# Patient Record
Sex: MALE | Race: WHITE | NOT HISPANIC OR LATINO | Employment: OTHER | URBAN - METROPOLITAN AREA
[De-identification: names, ages, dates, MRNs, and addresses within clinical notes are randomized per-mention and may not be internally consistent; named-entity substitution may affect disease eponyms.]

---

## 2017-02-02 ENCOUNTER — GENERIC CONVERSION - ENCOUNTER (OUTPATIENT)
Dept: OTHER | Facility: OTHER | Age: 52
End: 2017-02-02

## 2017-02-06 ENCOUNTER — ALLSCRIPTS OFFICE VISIT (OUTPATIENT)
Dept: OTHER | Facility: OTHER | Age: 52
End: 2017-02-06

## 2017-02-06 DIAGNOSIS — E78.5 HYPERLIPIDEMIA: ICD-10-CM

## 2017-02-14 ENCOUNTER — GENERIC CONVERSION - ENCOUNTER (OUTPATIENT)
Dept: OTHER | Facility: OTHER | Age: 52
End: 2017-02-14

## 2017-04-21 ENCOUNTER — HOSPITAL ENCOUNTER (EMERGENCY)
Facility: HOSPITAL | Age: 52
Discharge: HOME | End: 2017-04-21
Admitting: EMERGENCY MEDICINE
Payer: COMMERCIAL

## 2017-04-21 ENCOUNTER — APPOINTMENT (EMERGENCY)
Dept: RADIOLOGY | Facility: HOSPITAL | Age: 52
End: 2017-04-21
Payer: COMMERCIAL

## 2017-04-21 VITALS
RESPIRATION RATE: 18 BRPM | HEART RATE: 85 BPM | OXYGEN SATURATION: 99 % | SYSTOLIC BLOOD PRESSURE: 130 MMHG | TEMPERATURE: 98.2 F | DIASTOLIC BLOOD PRESSURE: 79 MMHG | WEIGHT: 155.4 LBS

## 2017-04-21 DIAGNOSIS — M79.10 MUSCLE PAIN: ICD-10-CM

## 2017-04-21 DIAGNOSIS — M25.511 SHOULDER PAIN, RIGHT: Primary | ICD-10-CM

## 2017-04-21 PROCEDURE — 99283 EMERGENCY DEPT VISIT LOW MDM: CPT

## 2017-04-21 PROCEDURE — 73030 X-RAY EXAM OF SHOULDER: CPT

## 2017-04-21 RX ORDER — METHOCARBAMOL 500 MG/1
500 TABLET, FILM COATED ORAL 4 TIMES DAILY
Qty: 20 TABLET | Refills: 0 | Status: SHIPPED | OUTPATIENT
Start: 2017-04-21 | End: 2017-07-12

## 2017-05-02 ENCOUNTER — ALLSCRIPTS OFFICE VISIT (OUTPATIENT)
Dept: OTHER | Facility: OTHER | Age: 52
End: 2017-05-02

## 2017-05-23 ENCOUNTER — ALLSCRIPTS OFFICE VISIT (OUTPATIENT)
Dept: OTHER | Facility: OTHER | Age: 52
End: 2017-05-23

## 2017-05-23 DIAGNOSIS — E55.9 VITAMIN D DEFICIENCY: ICD-10-CM

## 2017-05-23 DIAGNOSIS — E78.1 PURE HYPERGLYCERIDEMIA: ICD-10-CM

## 2017-05-23 DIAGNOSIS — E78.5 HYPERLIPIDEMIA: ICD-10-CM

## 2017-05-23 DIAGNOSIS — M75.50 BURSITIS OF SHOULDER: ICD-10-CM

## 2017-05-23 DIAGNOSIS — M54.2 CERVICALGIA: ICD-10-CM

## 2017-05-23 DIAGNOSIS — Z98.890 OTHER SPECIFIED POSTPROCEDURAL STATES: ICD-10-CM

## 2017-05-23 DIAGNOSIS — S49.91XA UNSPECIFIED INJURY OF RIGHT SHOULDER AND UPPER ARM, INITIAL ENCOUNTER: ICD-10-CM

## 2017-06-05 ENCOUNTER — ALLSCRIPTS OFFICE VISIT (OUTPATIENT)
Dept: OTHER | Facility: OTHER | Age: 52
End: 2017-06-05

## 2017-06-21 ENCOUNTER — TRANSCRIBE ORDERS (OUTPATIENT)
Dept: ADMINISTRATIVE | Facility: HOSPITAL | Age: 52
End: 2017-06-21

## 2017-06-21 ENCOUNTER — ALLSCRIPTS OFFICE VISIT (OUTPATIENT)
Dept: OTHER | Facility: OTHER | Age: 52
End: 2017-06-21

## 2017-06-21 DIAGNOSIS — S49.91XD INJURY OF RIGHT UPPER ARM, SUBSEQUENT ENCOUNTER: Primary | ICD-10-CM

## 2017-06-27 DIAGNOSIS — E78.5 HYPERLIPIDEMIA: ICD-10-CM

## 2017-06-28 ENCOUNTER — HOSPITAL ENCOUNTER (OUTPATIENT)
Dept: MRI IMAGING | Facility: HOSPITAL | Age: 52
Discharge: HOME | End: 2017-06-28
Attending: ORTHOPAEDIC SURGERY
Payer: COMMERCIAL

## 2017-06-28 DIAGNOSIS — S49.91XA UNSPECIFIED INJURY OF RIGHT SHOULDER AND UPPER ARM, INITIAL ENCOUNTER: ICD-10-CM

## 2017-06-28 PROCEDURE — 73221 MRI JOINT UPR EXTREM W/O DYE: CPT

## 2017-07-05 ENCOUNTER — ALLSCRIPTS OFFICE VISIT (OUTPATIENT)
Dept: OTHER | Facility: OTHER | Age: 52
End: 2017-07-05

## 2017-07-11 ENCOUNTER — ANESTHESIA EVENT (OUTPATIENT)
Dept: PERIOP | Facility: HOSPITAL | Age: 52
End: 2017-07-11
Payer: COMMERCIAL

## 2017-07-12 ENCOUNTER — GENERIC CONVERSION - ENCOUNTER (OUTPATIENT)
Dept: OTHER | Facility: OTHER | Age: 52
End: 2017-07-12

## 2017-07-24 ENCOUNTER — ANESTHESIA (OUTPATIENT)
Dept: PERIOP | Facility: HOSPITAL | Age: 52
End: 2017-07-24
Payer: COMMERCIAL

## 2017-07-24 ENCOUNTER — HOSPITAL ENCOUNTER (OUTPATIENT)
Facility: HOSPITAL | Age: 52
Setting detail: OUTPATIENT SURGERY
Discharge: HOME | End: 2017-07-24
Attending: ORTHOPAEDIC SURGERY | Admitting: ORTHOPAEDIC SURGERY
Payer: COMMERCIAL

## 2017-07-24 VITALS
TEMPERATURE: 98.4 F | DIASTOLIC BLOOD PRESSURE: 61 MMHG | SYSTOLIC BLOOD PRESSURE: 124 MMHG | WEIGHT: 161 LBS | RESPIRATION RATE: 20 BRPM | HEART RATE: 92 BPM | BODY MASS INDEX: 28.53 KG/M2 | HEIGHT: 63 IN | OXYGEN SATURATION: 94 %

## 2017-07-24 PROCEDURE — C1713 ANCHOR/SCREW BN/BN,TIS/BN: HCPCS | Performed by: ORTHOPAEDIC SURGERY

## 2017-07-24 DEVICE — SYSTEM IMPLANT DEL PROX TENODSIS REPAIR: Type: IMPLANTABLE DEVICE | Site: SHOULDER | Status: FUNCTIONAL

## 2017-07-24 DEVICE — ANCHOR SUT 4.5 X 14MM BCMPS CRKSCR FLLY THRD: Type: IMPLANTABLE DEVICE | Site: SHOULDER | Status: FUNCTIONAL

## 2017-07-24 DEVICE — SYSTEM IMPLANT SPEEDBRIDGE W/4.75 BCMPS SWIVELOCK C: Type: IMPLANTABLE DEVICE | Site: SHOULDER | Status: FUNCTIONAL

## 2017-07-24 RX ORDER — SODIUM CHLORIDE 9 MG/ML
125 INJECTION, SOLUTION INTRAVENOUS CONTINUOUS
Status: DISCONTINUED | OUTPATIENT
Start: 2017-07-24 | End: 2017-07-24 | Stop reason: HOSPADM

## 2017-07-24 RX ORDER — ROPIVACAINE HYDROCHLORIDE 5 MG/ML
INJECTION, SOLUTION EPIDURAL; INFILTRATION; PERINEURAL AS NEEDED
Status: DISCONTINUED | OUTPATIENT
Start: 2017-07-24 | End: 2017-07-24 | Stop reason: SURG

## 2017-07-24 RX ORDER — ONDANSETRON 2 MG/ML
4 INJECTION INTRAMUSCULAR; INTRAVENOUS ONCE AS NEEDED
Status: DISCONTINUED | OUTPATIENT
Start: 2017-07-24 | End: 2017-07-24 | Stop reason: HOSPADM

## 2017-07-24 RX ORDER — MEPERIDINE HYDROCHLORIDE 50 MG/ML
12.5 INJECTION INTRAMUSCULAR; INTRAVENOUS; SUBCUTANEOUS AS NEEDED
Status: DISCONTINUED | OUTPATIENT
Start: 2017-07-24 | End: 2017-07-24 | Stop reason: HOSPADM

## 2017-07-24 RX ORDER — FENTANYL CITRATE 50 UG/ML
INJECTION, SOLUTION INTRAMUSCULAR; INTRAVENOUS AS NEEDED
Status: DISCONTINUED | OUTPATIENT
Start: 2017-07-24 | End: 2017-07-24 | Stop reason: SURG

## 2017-07-24 RX ORDER — OXYCODONE HYDROCHLORIDE AND ACETAMINOPHEN 5; 325 MG/1; MG/1
2 TABLET ORAL EVERY 4 HOURS PRN
Status: DISCONTINUED | OUTPATIENT
Start: 2017-07-24 | End: 2017-07-24 | Stop reason: HOSPADM

## 2017-07-24 RX ORDER — ROCURONIUM BROMIDE 10 MG/ML
INJECTION, SOLUTION INTRAVENOUS AS NEEDED
Status: DISCONTINUED | OUTPATIENT
Start: 2017-07-24 | End: 2017-07-24 | Stop reason: SURG

## 2017-07-24 RX ORDER — OXYCODONE HYDROCHLORIDE 5 MG/1
5 TABLET ORAL EVERY 4 HOURS PRN
Qty: 60 TABLET | Refills: 0 | Status: SHIPPED | OUTPATIENT
Start: 2017-07-24 | End: 2017-08-03

## 2017-07-24 RX ORDER — OXYCODONE HYDROCHLORIDE AND ACETAMINOPHEN 5; 325 MG/1; MG/1
1 TABLET ORAL EVERY 4 HOURS PRN
Status: DISCONTINUED | OUTPATIENT
Start: 2017-07-24 | End: 2017-07-24 | Stop reason: HOSPADM

## 2017-07-24 RX ORDER — GLYCOPYRROLATE 0.2 MG/ML
INJECTION INTRAMUSCULAR; INTRAVENOUS AS NEEDED
Status: DISCONTINUED | OUTPATIENT
Start: 2017-07-24 | End: 2017-07-24 | Stop reason: SURG

## 2017-07-24 RX ORDER — MORPHINE SULFATE 2 MG/ML
2 INJECTION, SOLUTION INTRAMUSCULAR; INTRAVENOUS EVERY 4 HOURS PRN
Status: DISCONTINUED | OUTPATIENT
Start: 2017-07-24 | End: 2017-07-24 | Stop reason: HOSPADM

## 2017-07-24 RX ORDER — FENTANYL CITRATE/PF 50 MCG/ML
50 SYRINGE (ML) INJECTION
Status: DISCONTINUED | OUTPATIENT
Start: 2017-07-24 | End: 2017-07-24 | Stop reason: HOSPADM

## 2017-07-24 RX ORDER — PROPOFOL 10 MG/ML
INJECTION, EMULSION INTRAVENOUS AS NEEDED
Status: DISCONTINUED | OUTPATIENT
Start: 2017-07-24 | End: 2017-07-24 | Stop reason: SURG

## 2017-07-24 RX ORDER — ONDANSETRON 2 MG/ML
INJECTION INTRAMUSCULAR; INTRAVENOUS AS NEEDED
Status: DISCONTINUED | OUTPATIENT
Start: 2017-07-24 | End: 2017-07-24 | Stop reason: SURG

## 2017-07-24 RX ORDER — NAPROXEN 500 MG/1
500 TABLET ORAL 2 TIMES DAILY WITH MEALS
Qty: 60 TABLET | Refills: 0 | Status: SHIPPED | OUTPATIENT
Start: 2017-07-24 | End: 2017-11-13 | Stop reason: ALTCHOICE

## 2017-07-24 RX ORDER — ALBUTEROL SULFATE 2.5 MG/3ML
2.5 SOLUTION RESPIRATORY (INHALATION) ONCE AS NEEDED
Status: DISCONTINUED | OUTPATIENT
Start: 2017-07-24 | End: 2017-07-24 | Stop reason: HOSPADM

## 2017-07-24 RX ORDER — PROMETHAZINE HYDROCHLORIDE 12.5 MG/1
12.5 TABLET ORAL EVERY 6 HOURS PRN
Qty: 30 TABLET | Refills: 0 | Status: SHIPPED | OUTPATIENT
Start: 2017-07-24 | End: 2017-11-13 | Stop reason: ALTCHOICE

## 2017-07-24 RX ORDER — EPHEDRINE SULFATE 50 MG/ML
INJECTION, SOLUTION INTRAVENOUS AS NEEDED
Status: DISCONTINUED | OUTPATIENT
Start: 2017-07-24 | End: 2017-07-24 | Stop reason: SURG

## 2017-07-24 RX ORDER — MIDAZOLAM HYDROCHLORIDE 1 MG/ML
INJECTION INTRAMUSCULAR; INTRAVENOUS AS NEEDED
Status: DISCONTINUED | OUTPATIENT
Start: 2017-07-24 | End: 2017-07-24 | Stop reason: SURG

## 2017-07-24 RX ADMIN — FENTANYL CITRATE 100 MCG: 50 INJECTION, SOLUTION INTRAMUSCULAR; INTRAVENOUS at 11:06

## 2017-07-24 RX ADMIN — LIDOCAINE HYDROCHLORIDE 100 MG: 20 INJECTION, SOLUTION INTRAVENOUS at 11:43

## 2017-07-24 RX ADMIN — PHENYLEPHRINE HYDROCHLORIDE 25 MCG/MIN: 10 INJECTION INTRAVENOUS at 13:16

## 2017-07-24 RX ADMIN — SODIUM CHLORIDE: 0.9 INJECTION, SOLUTION INTRAVENOUS at 12:18

## 2017-07-24 RX ADMIN — ROCURONIUM BROMIDE 50 MG: 10 INJECTION, SOLUTION INTRAVENOUS at 11:43

## 2017-07-24 RX ADMIN — ONDANSETRON HYDROCHLORIDE 4 MG: 2 INJECTION, SOLUTION INTRAVENOUS at 13:45

## 2017-07-24 RX ADMIN — EPHEDRINE SULFATE 10 MG: 50 INJECTION, SOLUTION INTRAMUSCULAR; INTRAVENOUS; SUBCUTANEOUS at 12:28

## 2017-07-24 RX ADMIN — PROPOFOL 200 MG: 10 INJECTION, EMULSION INTRAVENOUS at 11:43

## 2017-07-24 RX ADMIN — NEOSTIGMINE METHYLSULFATE 3 MG: 1 INJECTION, SOLUTION INTRAMUSCULAR; INTRAVENOUS; SUBCUTANEOUS at 13:45

## 2017-07-24 RX ADMIN — FENTANYL CITRATE 50 MCG: 50 INJECTION INTRAMUSCULAR; INTRAVENOUS at 14:46

## 2017-07-24 RX ADMIN — EPHEDRINE SULFATE 10 MG: 50 INJECTION, SOLUTION INTRAMUSCULAR; INTRAVENOUS; SUBCUTANEOUS at 12:08

## 2017-07-24 RX ADMIN — GLYCOPYRROLATE 0.5 MG: 0.2 INJECTION, SOLUTION INTRAMUSCULAR; INTRAVENOUS at 13:45

## 2017-07-24 RX ADMIN — ROPIVACAINE HYDROCHLORIDE 40 ML: 5 INJECTION, SOLUTION EPIDURAL; INFILTRATION; PERINEURAL at 11:11

## 2017-07-24 RX ADMIN — EPHEDRINE SULFATE 10 MG: 50 INJECTION, SOLUTION INTRAMUSCULAR; INTRAVENOUS; SUBCUTANEOUS at 12:12

## 2017-07-24 RX ADMIN — CEFAZOLIN SODIUM 1000 MG: 1 SOLUTION INTRAVENOUS at 11:31

## 2017-07-24 RX ADMIN — FENTANYL CITRATE 50 MCG: 50 INJECTION INTRAMUSCULAR; INTRAVENOUS at 14:40

## 2017-07-24 RX ADMIN — SODIUM CHLORIDE 125 ML/HR: 0.9 INJECTION, SOLUTION INTRAVENOUS at 10:33

## 2017-07-24 RX ADMIN — MIDAZOLAM HYDROCHLORIDE 4 MG: 1 INJECTION, SOLUTION INTRAMUSCULAR; INTRAVENOUS at 11:06

## 2017-07-25 ENCOUNTER — HOSPITAL ENCOUNTER (EMERGENCY)
Facility: HOSPITAL | Age: 52
Discharge: HOME | End: 2017-07-25
Attending: EMERGENCY MEDICINE | Admitting: EMERGENCY MEDICINE
Payer: COMMERCIAL

## 2017-07-25 VITALS
RESPIRATION RATE: 18 BRPM | SYSTOLIC BLOOD PRESSURE: 157 MMHG | DIASTOLIC BLOOD PRESSURE: 87 MMHG | BODY MASS INDEX: 28.52 KG/M2 | TEMPERATURE: 98.1 F | HEART RATE: 99 BPM | OXYGEN SATURATION: 96 % | WEIGHT: 161 LBS

## 2017-07-25 DIAGNOSIS — G89.18 ACUTE POST-OPERATIVE PAIN: Primary | ICD-10-CM

## 2017-07-25 PROCEDURE — 99283 EMERGENCY DEPT VISIT LOW MDM: CPT

## 2017-07-25 RX ORDER — OXYCODONE HYDROCHLORIDE 10 MG/1
10 TABLET ORAL ONCE
Status: COMPLETED | OUTPATIENT
Start: 2017-07-25 | End: 2017-07-25

## 2017-07-25 RX ORDER — OXYCODONE HYDROCHLORIDE 5 MG/1
5 TABLET ORAL ONCE
Status: COMPLETED | OUTPATIENT
Start: 2017-07-25 | End: 2017-07-25

## 2017-07-25 RX ADMIN — OXYCODONE HYDROCHLORIDE 10 MG: 10 TABLET ORAL at 10:44

## 2017-07-25 RX ADMIN — OXYCODONE HYDROCHLORIDE 5 MG: 5 TABLET ORAL at 12:07

## 2017-08-07 ENCOUNTER — GENERIC CONVERSION - ENCOUNTER (OUTPATIENT)
Dept: OTHER | Facility: OTHER | Age: 52
End: 2017-08-07

## 2017-08-08 ENCOUNTER — ALLSCRIPTS OFFICE VISIT (OUTPATIENT)
Dept: OTHER | Facility: OTHER | Age: 52
End: 2017-08-08

## 2017-08-08 DIAGNOSIS — M75.21 BICIPITAL TENDINITIS OF RIGHT SHOULDER: ICD-10-CM

## 2017-08-08 DIAGNOSIS — S49.91XA UNSPECIFIED INJURY OF RIGHT SHOULDER AND UPPER ARM, INITIAL ENCOUNTER: ICD-10-CM

## 2017-08-08 DIAGNOSIS — M75.111 INCOMPLETE TEAR OF RIGHT ROTATOR CUFF: ICD-10-CM

## 2017-08-22 ENCOUNTER — TRANSCRIBE ORDERS (OUTPATIENT)
Dept: ADMINISTRATIVE | Facility: HOSPITAL | Age: 52
End: 2017-08-22

## 2017-08-22 ENCOUNTER — APPOINTMENT (OUTPATIENT)
Dept: LAB | Facility: HOSPITAL | Age: 52
End: 2017-08-22
Payer: COMMERCIAL

## 2017-08-22 DIAGNOSIS — E78.5 HYPERLIPIDEMIA: ICD-10-CM

## 2017-08-22 LAB
BASOPHILS # BLD AUTO: 0.03 THOUSANDS/ΜL (ref 0–0.1)
BASOPHILS NFR BLD AUTO: 0 % (ref 0–1)
EOSINOPHIL # BLD AUTO: 0.24 THOUSAND/ΜL (ref 0–0.61)
EOSINOPHIL NFR BLD AUTO: 3 % (ref 0–6)
ERYTHROCYTE [DISTWIDTH] IN BLOOD BY AUTOMATED COUNT: 14.4 % (ref 11.6–15.1)
HCT VFR BLD AUTO: 42.2 % (ref 36.5–49.3)
HGB BLD-MCNC: 14.8 G/DL (ref 12–17)
LYMPHOCYTES # BLD AUTO: 2.31 THOUSANDS/ΜL (ref 0.6–4.47)
LYMPHOCYTES NFR BLD AUTO: 29 % (ref 14–44)
MCH RBC QN AUTO: 31.8 PG (ref 26.8–34.3)
MCHC RBC AUTO-ENTMCNC: 35.1 G/DL (ref 31.4–37.4)
MCV RBC AUTO: 91 FL (ref 82–98)
MONOCYTES # BLD AUTO: 0.63 THOUSAND/ΜL (ref 0.17–1.22)
MONOCYTES NFR BLD AUTO: 8 % (ref 4–12)
NEUTROPHILS # BLD AUTO: 4.81 THOUSANDS/ΜL (ref 1.85–7.62)
NEUTS SEG NFR BLD AUTO: 60 % (ref 43–75)
NRBC BLD AUTO-RTO: 0 /100 WBCS
PLATELET # BLD AUTO: 279 THOUSANDS/UL (ref 149–390)
PMV BLD AUTO: 10.2 FL (ref 8.9–12.7)
RBC # BLD AUTO: 4.66 MILLION/UL (ref 3.88–5.62)
WBC # BLD AUTO: 8.02 THOUSAND/UL (ref 4.31–10.16)

## 2017-08-22 PROCEDURE — 36415 COLL VENOUS BLD VENIPUNCTURE: CPT

## 2017-08-22 PROCEDURE — 85025 COMPLETE CBC W/AUTO DIFF WBC: CPT

## 2017-09-05 ENCOUNTER — GENERIC CONVERSION - ENCOUNTER (OUTPATIENT)
Dept: OTHER | Facility: OTHER | Age: 52
End: 2017-09-05

## 2017-09-11 ENCOUNTER — CLINICAL SUPPORT (OUTPATIENT)
Dept: OTHER | Facility: OTHER | Age: 52
End: 2017-09-11

## 2017-09-12 ENCOUNTER — GENERIC CONVERSION - ENCOUNTER (OUTPATIENT)
Dept: OTHER | Facility: OTHER | Age: 52
End: 2017-09-12

## 2017-10-09 ENCOUNTER — GENERIC CONVERSION - ENCOUNTER (OUTPATIENT)
Dept: OTHER | Facility: OTHER | Age: 52
End: 2017-10-09

## 2017-11-13 ENCOUNTER — HOSPITAL ENCOUNTER (EMERGENCY)
Facility: HOSPITAL | Age: 52
Discharge: HOME | End: 2017-11-13
Attending: EMERGENCY MEDICINE | Admitting: EMERGENCY MEDICINE
Payer: COMMERCIAL

## 2017-11-13 VITALS
SYSTOLIC BLOOD PRESSURE: 140 MMHG | HEART RATE: 83 BPM | TEMPERATURE: 98.5 F | RESPIRATION RATE: 20 BRPM | BODY MASS INDEX: 27.19 KG/M2 | OXYGEN SATURATION: 95 % | WEIGHT: 153.5 LBS | DIASTOLIC BLOOD PRESSURE: 88 MMHG

## 2017-11-13 DIAGNOSIS — T14.8XXD DELAYED WOUND HEALING: Primary | ICD-10-CM

## 2017-11-13 PROCEDURE — 99282 EMERGENCY DEPT VISIT SF MDM: CPT

## 2017-11-13 RX ORDER — GINSENG 100 MG
1 CAPSULE ORAL 2 TIMES DAILY
Qty: 15 G | Refills: 0 | Status: SHIPPED | OUTPATIENT
Start: 2017-11-13 | End: 2018-03-05

## 2017-11-13 RX ORDER — CEPHALEXIN 250 MG/1
250 CAPSULE ORAL EVERY 6 HOURS SCHEDULED
Qty: 28 CAPSULE | Refills: 0 | Status: SHIPPED | OUTPATIENT
Start: 2017-11-13 | End: 2017-11-20

## 2017-11-13 NOTE — ED PROVIDER NOTES
History  Chief Complaint   Patient presents with    Skin Problem     Has an open wound of the left arm  50year old male presents today c/o wound to left forearm  Pt reports sudden onset of itching which started 4 days ago and he says that he has been itching at it since  He denies any insect bites or new exposures  No history of same  No open wounds elsewhere  He denies pain, fevers or drainage, but does report that there was a "red ring around it" this morning  He put salt on it and says that it resolved  He denies IV drug use  Prior to Admission Medications   Prescriptions Last Dose Informant Patient Reported? Taking?    ALPRAZolam (XANAX) 0 25 mg tablet 11/13/2017 at Unknown time  Yes Yes   Sig: Take 0 25 mg by mouth 2 (two) times a day as needed     QUEtiapine (SEROquel) 100 mg tablet 11/12/2017 at Unknown time  Yes Yes   Sig: Take 100 mg by mouth daily at bedtime   albuterol (VENTOLIN HFA) 90 mcg/act inhaler 11/13/2017 at Unknown time  Yes Yes   Sig: Inhale 2 puffs every 4 (four) hours as needed     atorvastatin (LIPITOR) 20 mg tablet 11/13/2017 at Unknown time  Yes Yes   Sig: Take 20 mg by mouth daily     budesonide-formoterol (SYMBICORT) 160-4 5 mcg/act inhaler Past Week at Unknown time  Yes Yes   Sig: Inhale 2 puffs daily     lamoTRIgine (LaMICtal) 25 mg tablet 11/13/2017 at Unknown time  Yes Yes   Sig: Take 25 mg by mouth daily     omeprazole (PriLOSEC) 20 mg delayed release capsule 11/13/2017 at Unknown time  Yes Yes   Sig: Take 20 mg by mouth daily      Facility-Administered Medications: None       Past Medical History:   Diagnosis Date    Anxiety     Asthma     GERD (gastroesophageal reflux disease)     History of fusion of cervical spine     Hyperlipidemia     Neck pain     and down arms ke    Rotator cuff tear, right     Wears glasses        Past Surgical History:   Procedure Laterality Date    CERVICAL FUSION      NV SHLDR ARTHROSCOP,SURG,W/ROTAT CUFF REPR Right 7/24/2017 Procedure: ARTHROSCOPY SHOULDER, ROTATOR CUFF REPAIR,OPEN BICEP TENODESIS;  Surgeon: Bee Cast MD;  Location: [facility];  Service: Orthopedics       History reviewed  No pertinent family history  I have reviewed and agree with the history as documented  Social History   Substance Use Topics    Smoking status: Current Every Day Smoker     Packs/day: 0 25     Years: 10 00     Types: Cigarettes    Smokeless tobacco: Never Used      Comment: 2 ciggs/day    Alcohol use No        Review of Systems   Skin: Positive for wound  All other systems reviewed and are negative  Physical Exam  ED Triage Vitals [11/13/17 0952]   Temperature Pulse Respirations Blood Pressure SpO2   98 5 °F (36 9 °C) 83 20 140/88 95 %      Temp Source Heart Rate Source Patient Position - Orthostatic VS BP Location FiO2 (%)   Oral -- Sitting Right arm --      Pain Score       5           Orthostatic Vital Signs  Vitals:    11/13/17 0952   BP: 140/88   Pulse: 83   Patient Position - Orthostatic VS: Sitting       Physical Exam   Constitutional: He appears well-developed and well-nourished  No distress  HENT:   Head: Normocephalic and atraumatic  Mouth/Throat: Oropharynx is clear and moist    Eyes: Conjunctivae and EOM are normal    Neck: Normal range of motion  Cardiovascular: Normal rate and regular rhythm  Pulmonary/Chest: Effort normal and breath sounds normal  No respiratory distress  He has no wheezes  Abdominal: Soft  Bowel sounds are normal  He exhibits no distension  There is no tenderness  Musculoskeletal: Normal range of motion  Neurological: He is alert  Skin: Skin is warm and dry  Capillary refill takes less than 2 seconds  He is not diaphoretic  No erythema  1 5x1 5cm nonhealing wound with granulation tissue visible  No surrounding erythema, induration, or fluctuance  No streaking  Psychiatric: He has a normal mood and affect   His behavior is normal        ED Medications  Medications - No data to display    Diagnostic Studies  Results Reviewed     None                 No orders to display              Procedures  Procedures       Phone Contacts  ED Phone Contact    ED Course  ED Course                                MDM  Number of Diagnoses or Management Options  Delayed wound healing:   Diagnosis management comments: Will d/c pt on prophylactic abx and instruct to follow-up with wound-care  CritCare Time    Disposition  Final diagnoses:   Delayed wound healing - Nonhealing wound to LUE     Time reflects when diagnosis was documented in both MDM as applicable and the Disposition within this note     Time User Action Codes Description Comment    11/13/2017 10:37 AM Juan Jose, [facility]0 [facility]  8XXD] Delayed wound healing     11/13/2017 10:37 AM Russ Lockett Modify [W25  8XXD] Delayed wound healing Nonhealing wound to LUE      ED Disposition     None      Follow-up Information     Follow up With Specialties Details Why Contact Info Additional Information    Fabi Kenny PA-C Internal Medicine Schedule an appointment as soon as possible for a visit  [address] [place] [address] Schedule an appointment as soon as possible for a visit  [place] [address]  [phone] [address] [place] [address], [facility]        Patient's Medications   Discharge Prescriptions    BACITRACIN TOPICAL OINTMENT 500 UNITS/G TOPICAL OINTMENT    Apply 1 large application topically 2 (two) times a day       Start Date: 11/13/2017End Date: --       Order Dose: 1 large application       Quantity: 15 g    Refills: 0    CEPHALEXIN (KEFLEX) 250 MG CAPSULE    Take 1 capsule by mouth every 6 (six) hours for 7 days       Start Date: 11/13/2017End Date: 11/20/2017       Order Dose: 250 mg       Quantity: 28 capsule    Refills: 0     No discharge procedures on file      ED Provider  Electronically Signed by           Coy Moeller PA-C  11/13/17 1055

## 2017-11-17 ENCOUNTER — APPOINTMENT (OUTPATIENT)
Dept: WOUND CARE | Facility: HOSPITAL | Age: 52
End: 2017-11-17
Payer: COMMERCIAL

## 2017-11-17 PROCEDURE — 99213 OFFICE O/P EST LOW 20 MIN: CPT | Performed by: SURGERY

## 2017-11-17 PROCEDURE — 11042 DBRDMT SUBQ TIS 1ST 20SQCM/<: CPT | Performed by: SURGERY

## 2017-11-29 ENCOUNTER — APPOINTMENT (OUTPATIENT)
Dept: WOUND CARE | Facility: HOSPITAL | Age: 52
End: 2017-11-29
Payer: COMMERCIAL

## 2017-11-29 PROCEDURE — 99213 OFFICE O/P EST LOW 20 MIN: CPT | Performed by: FAMILY MEDICINE

## 2017-12-04 ENCOUNTER — APPOINTMENT (OUTPATIENT)
Dept: WOUND CARE | Facility: HOSPITAL | Age: 52
End: 2017-12-04
Payer: COMMERCIAL

## 2017-12-04 PROCEDURE — 99211 OFF/OP EST MAY X REQ PHY/QHP: CPT | Performed by: SURGERY

## 2017-12-07 ENCOUNTER — APPOINTMENT (OUTPATIENT)
Dept: WOUND CARE | Facility: HOSPITAL | Age: 52
End: 2017-12-07
Payer: COMMERCIAL

## 2017-12-07 PROCEDURE — 99212 OFFICE O/P EST SF 10 MIN: CPT | Performed by: FAMILY MEDICINE

## 2017-12-11 ENCOUNTER — ALLSCRIPTS OFFICE VISIT (OUTPATIENT)
Dept: OTHER | Facility: OTHER | Age: 52
End: 2017-12-11

## 2017-12-21 ENCOUNTER — ALLSCRIPTS OFFICE VISIT (OUTPATIENT)
Dept: OTHER | Facility: OTHER | Age: 52
End: 2017-12-21

## 2018-01-03 ENCOUNTER — ALLSCRIPTS OFFICE VISIT (OUTPATIENT)
Dept: OTHER | Facility: OTHER | Age: 53
End: 2018-01-03

## 2018-01-03 ENCOUNTER — APPOINTMENT (OUTPATIENT)
Dept: RADIOLOGY | Facility: OTHER | Age: 53
End: 2018-01-03
Payer: COMMERCIAL

## 2018-01-03 DIAGNOSIS — M54.12 RADICULOPATHY OF CERVICAL REGION: ICD-10-CM

## 2018-01-03 DIAGNOSIS — M54.50 LOW BACK PAIN: ICD-10-CM

## 2018-01-03 DIAGNOSIS — M25.519 PAIN IN SHOULDER: ICD-10-CM

## 2018-01-03 DIAGNOSIS — M50.00 CERVICAL DISC DISORDER WITH MYELOPATHY OF CERVICAL REGION: ICD-10-CM

## 2018-01-03 PROCEDURE — 73030 X-RAY EXAM OF SHOULDER: CPT

## 2018-01-04 NOTE — PROGRESS NOTES
Assessment   1  Right shoulder injury (959 2) (S49 91XA)    Plan   PMH: Shoulder pain    · * XR SHOULDER 2+ VIEW RIGHT; Status:Active; Requested ASV:[id number];   Right shoulder injury    · Follow-up visit in 3 weeks Evaluation and Treatment  Follow-up  Status: Hold For -    Scheduling  Requested for: [id number]   · Apply an ice pack 4 times a day for 20 minutes the first 2 days ; Status:Complete;   Done:    [id number] 02:46PM    Chief Complaint   1  Shoulder Pain    Discussion/Summary      Status post right shoulder arthroscopy with supraspinatus and subscapularis repair and open biceps tenodesis right shoulder pain without injury, nonspecific exam   discussed the treatment options including getting a new MRI versus ice and anti-inflammatories for a couple of weeks will observe this for the next 2-3 weeks and work on passive and active range of motion of the shoulder as needed in 3-4 weeks  History of Present Illness   53-year-old male follow-up right shoulder arthroscopy on 7/24/2017  He had been doing very well until 2 days ago when he had acute onset of right shoulder pain without an injury  He is concerned that he is retorquing his repairs  Denies numbness in the arm  patient's medical history, surgical history, social history, family history, medications, allergies, and review of systems were reviewed and updated today  of Systems:   Constitutional: No fever or chills, feels well, no tiredness, no recent weight gain or loss  Eyes: No complaints of eyesight problems, no red eyes  ENT: No loss of hearing, no nosebleeds, no sore throat  Cardiovascular: No chest pain, palpitations, leg claudication, or lower extremity edema  Respiratory: No shortness of breath, wheezing, or cough  Gastrointestinal: No abdominal pain, constipation, nausea / vomiting, no diarrhea  Genitourinary: No dysruia or incontinence  Musculoskeletal: As noted in HPI     Integumentary: No rash or skin lesions, no itching or dry skin, no wounds  Neurological: No headache, confusion, numbness or tingling, or dizziness  Endocrine: No muscle weakness, frequent urination, or excessive thirst    Psychiatric: No suicidal thoughts, anxiety, or depression  Active Problems   1  Asthma (493 90) (J45 909)   2  Asthma exacerbation (493 92) (J45 901)   3  Biceps tendonitis, right (726 12) (M75 21)   4  Bipolar disorder, mixed (296 60) (F31 60)   5  Carpal tunnel syndrome (354 0) (G56 00)   6  Cervical radiculopathy (723 4) (M54 12)   7  Chronic pain (338 29) (G89 29)   8  Dyslipidemia (272 4) (E78 5)   9  GERD without esophagitis (530 81) (K21 9)   10  History of neck surgery (V15 29) (Z98 890)   11  HNP (herniated nucleus pulposus) with myelopathy, cervical (722 71) (M50 00)   12  Hypertriglyceridemia (272 1) (E78 1)   13  Incomplete tear of right rotator cuff (840 4) (M75 111)   14  Marijuana smoker, episodic (305 22) (F12 90)   15  Nicotine dependence (305 1) (F17 200)   16  Pain medication agreement (V58 69) (Z02 89)   17  Prepatellar bursitis of right knee (726 65) (M70 41)   18  Right shoulder injury (959 2) (S49 91XA)   19  Subacromial bursitis (726 19) (M75 50)   20  Tobacco abuse (305 1) (Z72 0)   21   Vitamin D deficiency (268 9) (E55 9)    Past Medical History    · History of Acute bronchitis, unspecified organism (466 0) (J20 9)   · History of Acute URI (465 9) (J06 9)   · History of Ankle pain, left (719 47) (M25 572)   · History of Anxiety (300 00) (F41 9)   · History of Back problem (724 9) (M53 9)   · History of Dermatitis of foot (692 9) (L30 9)   · History of Effusion of right knee (719 06) (M25 461)   · History of Encounter for screening for lipid disorder (V77 91) (Z13 220)   · History of Fracture of fibula, distal (824 8)   · History of acute otitis media (V12 49) (Z86 69)   · History of bronchitis (V12 69) (Z87 09)   · History of folliculitis (N01 5) (A42 9)   · History of gastroesophageal reflux (GERD) (V12 79) (Z87 19)   · History of neck pain (V13 59) (Z87 39)   · History of pharyngitis (V12 69) (Z87 09)   · History of viral gastroenteritis (V12 09) (Z86 19)   · History of Knee pain (719 46) (M25 569)   · History of Moderate persistent asthma (493 90) (J45 40)   · History of Neck problem (V48 9) (R68 89)   · History of Neurosis, anxiety, panic type (300 01) (F41 0)   · History of Right knee pain (719 46) (M25 561)   · History of Shoulder pain (719 41) (M25 519)   · History of Tick bites (919 4,E906 4) (W57 XXXA)   · History of Tobacco abuse (305 1) (Z72 0)    Surgical History    · History of Neck Surgery   · History of Oral Surgery Tooth Extraction    Family History   Mother    · No pertinent family history   · Family history of Ovarian cancer  Unknown    · No pertinent family history  Family History    · Family history of Type 2 diabetes mellitus without complication, without long-term current    use of insulin    Social History    · Current every day smoker (305 1) (F17 200)   · No drug use   · Recovering 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)   · last drink [id number]   · Denied: History of Social alcohol use    Current Meds    1  Atorvastatin Calcium 20 MG Oral Tablet (Lipitor); TAKE 1 TABLET DAILY; Therapy: 25BDI5087 to ([facility])  Requested for: 93FHL9052; Last     RT:80JVK8866 Ordered   2  Bupivacaine HCl - 0 25 % Injection Solution (Marcaine); INJECT 4  ML Intra-articular; To     Be Done: 78VLT7737; Status: HOLD FOR - Administration Ordered   3  LamoTRIgine 25 MG Oral Tablet; take 2 tablet daily; Therapy: 64LLO8987 to Recorded   4  Lidocream 4 % External Cream; USE TOPICALLY AS DIRECTED; Therapy: 13GOW7510 to (Last Rx:36Dvl7676)  Requested for: 46Acu0072 Ordered   5  Meloxicam 7 5 MG Oral Tablet; TAKE 1 TABLET TWICE DAILY AS NEEDED FOR PAIN;     Therapy: 00XAS5339 to (Evaluate:22Jan2018)  Requested for: 24Oct2017; Last     PS:43BNC0560 Ordered   6   Naprosyn 500 MG Oral Tablet (Naproxen); TAKE 1 TABLET TWICE DAILY AS NEEDED     Recorded   7  Omeprazole 20 MG Oral Capsule Delayed Release; take 1 capsule daily  Requested for:     03OLI2633; Last Rx:24Oct2017 Ordered   8  QUEtiapine Fumarate 100 MG Oral Tablet; TAKE 1 TABLET TWICE DAILY  Requested     for: 56Kgr5132; Last Rx:15Ptz7437 Ordered   9  Symbicort 160-4 5 MCG/ACT Inhalation Aerosol; Inhale 2 puffs twice daily; Therapy: 99QFL1331 to (Last Rx:21Nov2017)  Requested for: 21Nov2017 Ordered   10  Ventolin  (90 Base) MCG/ACT Inhalation Aerosol Solution; INHALE 1 TO 2      PUFFS EVERY 4 TO 6 HOURS AS NEEDED; Therapy: 85JHT2119 to (Last Rx:21Nov2017)  Requested for: 21Nov2017 Ordered   11  Xanax 0 25 MG Oral Tablet (ALPRAZolam); take 2 tablets by mouth once daily; Therapy: 95KMR9792 to Recorded    Allergies   1  No Known Drug Allergies  2  No Known Environmental Allergies   3  No Known Food Allergies    Vitals    Recorded: 13FGC5205 08:44AM   Heart Rate 83   Systolic 986   Diastolic 81   Height 5 ft 3 8 in   Weight 156 lb    BMI Calculated 26 95   BSA Calculated 1 76     Physical Exam      Right Shoulder: Appearance: Normal except  Tenderness: None except the  ROM: Full except as noted: Motor: Normal except as noted:   Special Tests: Negative except  (Incisions are healed well  No swelling or ecchymosis  Biceps has normal contour  Range of motion in the right shoulder is limited due to pain, but pain is out of proportion  Gentle strength testing of the rotator cuff shows 4 out of 5 strength in the right arm    Neurovascular intact)           Constitutional - General appearance: Normal       Musculoskeletal - Gait and station: Normal -- Digits and nails: Normal -- Muscle strength/tone: Normal       Cardiovascular - Pulses: Normal -- Examination of extremities for edema and/or varicosities: Normal       Skin - Skin and subcutaneous tissue: Normal       Neurologic - Sensation: Normal       Psychiatric - Orientation to person, place, and time: Normal -- Mood and affect: Normal       Eyes      Conjunctiva and lids: Normal        Pupils and irises: Normal        Results/Data   I personally reviewed the films/images/results in the office today  My interpretation follows  X-ray Review Right shoulder: No acute fractures  Biceps tenodesis button is visible without complication  Future Appointments      Date/Time Provider Specialty Site   01/05/2018 04:30 PM [place], [facility] Internal Medicine [facility]   01/23/2018 11:30 AM JAIRO Saini   Orthopedic Surgery [facility] SPECIALISTS [facility]     Signatures    Electronically signed by : JAIRO Jovel ; Lit  3 2018  2:47PM EST                       (Author)

## 2018-01-05 ENCOUNTER — ALLSCRIPTS OFFICE VISIT (OUTPATIENT)
Dept: OTHER | Facility: OTHER | Age: 53
End: 2018-01-05

## 2018-01-10 NOTE — PROGRESS NOTES
Assessment    1  Pharyngitis (462) (J02 9)   2  Asthma (493 90) (J45 909)   3  Cervical radiculopathy (723 4) (M54 12)   4  Bipolar disorder, mixed (296 60) (F31 60)   5  Dermatitis of foot (692 9) (L30 9)   6  History of Tobacco abuse (305 1) (Z72 0)   7  Tobacco abuse (305 1) (Z72 0)    Plan  Asthma    · ProAir  (90 Base) MCG/ACT Inhalation Aerosol Solution; INHALE 1 TO 2 PUFFS EVERY  4 TO 6 HOURS AS NEEDED   · Complete PFT; Status:Hold For - Scheduling; Requested for:18Jan2016;   Carpal tunnel syndrome    · Nystatin 465445 UNIT/ML Mouth/Throat Suspension; SWISH AND SWALLOW 5ML 4 TIMES  DAILY  Carpal tunnel syndrome, Cervical radiculopathy    · TraMADol HCl - 50 MG Oral Tablet (Ultram); Take 1 tab po tid prn for pain  Dermatitis of foot    · Nystatin-Triamcinolone 207926-4 1 UNIT/GM-% External Cream; APPLY SPARINGLY TO  AFFECTED AREA(S) TWICE DAILY to left foot  GERD without esophagitis    · Omeprazole 20 MG Oral Capsule Delayed Release; take 1 capsule daily  Pharyngitis    · Azithromycin 250 MG Oral Tablet; Take 2 tablets today, then 1 tablet daily for 4 days  Tobacco abuse    · Nicotine Step 1 21 MG/24HR Transdermal Patch 24 Hour; APPLY 1 PATCH DAILY AS DIRECTED    Discussion/Summary  Discussion Summary:   Encouraged him to follow with neurosurgeon and cut back on smoking if he is to be considered for possible surgery in the past  Discussed smoking cessation, <1 PPD smoker  Wants to start patches  Patient aware he is not to smoke on patch  Discussed quitting techniques  Continue with ultram for cervical radiculopathy, discussed risks of delaying treatment  Asthma somewhat improved with Symbicort  Stop smoking  Given nystatin gargles and spit to be sused as directed  Take aithromycin as directed  Continue with psych follow up  Topical nystain/triamcinolone cream to base of feet  Do not put between toes     Counseling Documentation With Imm: The patient was counseled regarding instructions for management, risk factor reductions, prognosis, patient and family education, impressions, risks and benefits of treatment options, importance of compliance with treatment  Medication SE Review and Pt Understands Tx: Possible side effects of new medications were reviewed with the patient/guardian today  The treatment plan was reviewed with the patient/guardian  The patient/guardian understands and agrees with the treatment plan      Chief Complaint  Chief Complaint Free Text Note Form: Pt  here to f/u carpal tunnel  no b/w to review  Pt  here c/o ears pain and R foot pain      History of Present Illness  HPI: 52year old male for eval  He notes that he saw pain specialist but notes he was told that he needed injection  He notes that he does not want injection and wants to follow with PCP for pain control  Taking meds as directed  Needs refills  Taking ultram as directed  He met with Dr Katie Ponce who discussed with him getting surgery    Patient at this time is not willing to get surgery  He is aware of the risk of delaying therapy  Following with psychiatrist at [facility]  Changing psychiatrist, will be with Dr Latesha Ortiz  Rash:   CHRIS BILLIG presents with complaints of rash (rash on right foot  No pain  No breaks in skin  + itch  )   Carpal Tunnel Syndrome (Brief): The patient is being seen for an initial evaluation of carpal tunnel syndrome  Symptoms are in the right wrist  Symptoms:  hand tingling and hand numbness, but no hand weakness  Associated symptoms:  Worse at night  Numbness and tingling in hands unchanged  Asthma (Follow-Up): The patient is being seen for a routine clinic follow-up of asthma  Asthma Control: Not well controlled  Interval Symptoms:   improved wheezing, stable coughing, denies shortness of breath and denies chest tightness  Associated symptoms include no chest pain or discomfort  Using symibcort  Does not want to get CT of chest of PFTs     Medications: the patient is not adherent with his medication regimen  Additional History: Using advair once daily  Notes he always has a wheeze  Cough improving  Radiculopathy, Cervical (Follow-Up): The patient is being seen for follow-up of cervical radiculopathy  Interval symptoms:  stable neck pain, stable neck stiffness, stable upper arm pain and stable upper arm paresthesias  Associated symptoms: no urinary incontinence  Additional history: No change in bowel or bladder function  Ran out of ultram, needs refills  Review of Systems  Complete-Male:   Cardiovascular: no chest pain and no palpitations  Respiratory: wheezing and Notes he continues to smoke  Unwilling to quit smoking oralia aiken to cut back  Does bring up mucus when he coughs  , but no shortness of breath and no cough  Gastrointestinal: no nausea and no vomiting  Active Problems    1  Asthma (493 90) (J45 909)   2  Asthma exacerbation (493 92) (J45 901)   3  Bipolar disorder, mixed (296 60) (F31 60)   4  Carpal tunnel syndrome (354 0) (G56 00)   5  Cervical radiculopathy (723 4) (M54 12)   6  GERD without esophagitis (530 81) (K21 9)    Past Medical History    1  History of Acute bronchitis, unspecified organism (466 0) (J20 9)   2  History of Acute URI (465 9) (J06 9)   3  History of Ankle pain, left (719 47) (M25 572)   4  History of Anxiety (300 00) (F41 9)   5  History of Back problem (724 9) (M53 9)   6  History of Encounter for screening for lipid disorder (V77 91) (Z13 220)   7  History of Fracture of fibula, distal (824 8)   8  History of acute otitis media (V12 49) (Z86 69)   9  History of bronchitis (V12 69) (Z87 09)   10  History of gastroesophageal reflux (GERD) (V12 79) (Z87 19)   11  History of viral gastroenteritis (V12 09) (Z86 19)   12  History of Knee pain (719 46) (M25 569)   13  History of Moderate persistent asthma (493 90) (J45 40)   14  History of Neck problem (V48 9) (R68 89)   15  History of Neurosis, anxiety, panic type (300 01) (F41 0)   16   History of Right knee pain (719 46) (M25 561)   17  History of Tobacco abuse (305 1) (Z72 0)  Active Problems And Past Medical History Reviewed: The active problems and past medical history were reviewed and updated today  Surgical History    1  History of Oral Surgery Tooth Extraction  Surgical History Reviewed: The surgical history was reviewed and updated today  Family History    1  No pertinent family history   2  Family history of Ovarian cancer    3  No pertinent family history  Family History Reviewed: The family history was reviewed and updated today  Social History    · Current every day smoker (305 1) (F17 200)   · No drug use   · Recovering alcoholic (162 73) (I10 48)   · Denied: History of Social alcohol use  Social History Reviewed: The social history was reviewed and updated today  The social history was reviewed and is unchanged  Current Meds   1  Fluticasone Propionate 50 MCG/ACT Nasal Suspension; USE 1 SPRAY IN EACH NOSTRIL TWICE   DAILY; Therapy: 27AUD6419 to (Last Rx:28Oct2015)  Requested for: 28Oct2015 Ordered   2  Gabapentin 600 MG Oral Tablet; Take 1 tablet twice daily; Therapy: 43PVF7361 to (Evaluate:97Zbf6150) Recorded   3  Meloxicam 7 5 MG Oral Tablet; TAKE 1 TABLET TWICE DAILY AS NEEDED FOR PAIN;   Therapy: 55YIR9774 to (Evaluate:47Qbg8027)  Requested for: 56HXO4681; Last Rx:01Oct2015   Ordered   4  Omeprazole 20 MG Oral Capsule Delayed Release; take 1 capsule daily  Requested for: 74ZUQ3761;   Last Rx:55Qok0932; Status: ACTIVE - Renewal Denied Ordered   5  Phenytoin Sodium Extended 100 MG Oral Capsule; Take 1 capsule twice daily; Therapy: 41GJG8843 to Recorded   6  QUEtiapine Fumarate 300 MG Oral Tablet; TAKE 1 TABLET AT BEDTIME; Therapy: 85AOS9818 to Recorded   7  Symbicort 160-4 5 MCG/ACT Inhalation Aerosol; Inhale 2 puffs twice daily; Therapy: 66IXW0477 to (Last Rx:11Nov2015)  Requested for: 87OTO2425 Ordered   8  TraMADol HCl - 50 MG Oral Tablet;  Take 1 tab po tid prn for pain; Therapy: 34QMZ2351 to (Last Rx:11Nov2015) Ordered  Medication List Reviewed: The medication list was reviewed and updated today  Allergies    1  No Known Drug Allergies    2  No Known Environmental Allergies   3  No Known Food Allergies    Vitals  Vital Signs [Data Includes: Current Encounter]    Recorded: [id number] 05:36PM Recorded: 11LND7448 04:49PM   Temperature  97 9 F, Oral   Heart Rate  79   Systolic 436, LUE, Sitting 152, LUE, Sitting   Diastolic 86, LUE, Sitting 100, LUE, Sitting   Weight  155 lb    BMI Calculated  26 61   BSA Calculated  1 76   O2 Saturation  99     Physical Exam    Constitutional   General appearance: No acute distress, well appearing and well nourished  Seated in NAD, cooperative, alert  Eyes   Pupils and irises: Equal, round and reactive to light  Ears, Nose, Mouth, and Throat Mild erythema of left TM  + erythema of posterior pharynx  Uvula midline  Oropharynx: Normal with no erythema, edema, exudate or lesions  MMM, mild erythema  Pulmonary   Respiratory effort: Abnormal   Scattered late expiratory wheeze, no rhonchi, no crackles  No resp distress PO2 stable  Auscultation of lungs: Abnormal     Cardiovascular   Auscultation of heart: Normal rate and rhythm, normal S1 and S2, without murmurs  RRR  Abdomen   Abdomen: Non-tender, no masses  Soft  Musculoskeletal AROM slight reduced c-spine ROM  AROM bilateral shoulders  + paraspinus muscle spasm  Skin + xerosis at MCP head 1-5th on plantar aspect of foot  Skin intact, xerotic, puritic  No erythema  Psychiatric Alert, pleasant cooperative          Results/Data         Future Appointments    Date/Time Provider Specialty Site   02/17/2016 05:30 PM Winnie Krabbe, [facility] Internal Medicine [facility] PRIMARY CARE     Signatures   Electronically signed by : Macarena Ramirez [facility]; Jan 18 2016  6:31PM EST                       (Author)    Electronically signed by : Washington Mckeon MD; Jan 18 2016  6:32PM EST                       (Author)

## 2018-01-10 NOTE — MISCELLANEOUS
Signatures   Electronically signed by : Jose E Chavarria DO; Sep 11 2017  2:29PM EST                       (Author)

## 2018-01-10 NOTE — PROGRESS NOTES
Assessment    1  Bipolar disorder, mixed (296 60) (F31 60)   2  GERD without esophagitis (530 81) (K21 9)   3  Hypertriglyceridemia (272 1) (E78 1)   4  Asthma (493 90) (J45 909)   5  Carpal tunnel syndrome (354 0) (G56 00)   6  Cervical radiculopathy (723 4) (M54 12)    Plan   Bipolar disorder, mixed    · From  QUEtiapine Fumarate 200 MG Oral Tablet  To QUEtiapine Fumarate 100 MG Oral  Tablet TAKE 1 TABLET TWICE DAILY  Bipolar disorder, mixed, GERD without esophagitis, Hypertriglyceridemia    · (1) CBC/PLT/DIFF; Status:Active; Requested for:87Hbq1405;    · (1) COMPREHENSIVE METABOLIC PANEL; Status:Active; Requested for:51Ipn4823;    · (1) LIPID PANEL, FASTING; Status:Active; Requested for:62Ujj0235;   Chronic pain    · DULoxetine HCl - 30 MG Oral Capsule Delayed Release Particles (Cymbalta); TAKE 1  CAPSULE DAILY    Hydrocodone-Acetaminophen 5-325 MG Oral Tablet; TAKE 1 TABLET Every 8 hours; Therapy: 92QYG4734 to (Evaluate:91Wfj9053); Last Rx:86Hiv6705; Status: ACTIVE Ordered  Rx By: Reyna Ritchie; Dispense: 0 Days ; #:60 Tablet; Refill: 0;   For: Cervical radiculopathy; BREANNA = N; Record     Annotations              [facility] Script ready for p/u @ [facility] office  [facility] SHRED RX  RE-PRINTED RX IN [facility]  PT AWARE TO PU RX @ [facility]     Discussion/Summary  Discussion Summary:   Since last visit has followed with ortho and had hand injection  Has been following with ortho for knee pain  Keep [facility] aware of any change in tx plan  Continue gabapentin 600 mg every 8 hours  Continue Cymbalta  Will have [facility] get notes from Dr Jannette Castillo and if stable on Seroquel will write for this medication at future visits  Patient coming form [place], compliant on med refills, will give 60 tabs with next refill  Encouraged him to go for labs as directed       Counseling Documentation With Imm: The patient was counseled regarding diagnostic results, instructions for management, risk factor reductions, prognosis, patient and family education, impressions, risks and benefits of treatment options, importance of compliance with treatment  Medication SE Review and Pt Understands Tx: Possible side effects of new medications were reviewed with the patient/guardian today  The treatment plan was reviewed with the patient/guardian  The patient/guardian understands and agrees with the treatment plan      Chief Complaint  Chief Complaint Free Text Note Form: pt here for f/u gerd, bipolar      History of Present Illness  HPI: 52year old male  He notes since last visit had injection into his right hand, limited improvement to his hand  Not currently working  Had his knee drained and is considering surgical intervention for his knees  Patient notes he is not interested in neck surgery  Takin pain meds as directed  Follows [facility] psych Dr Chang Michel, taking seroquel 100mg twice daily  Getting records forwarded to [facility]  Meds help him sleep mood has been stable  Gastroesophageal Reflux Disease (Brief): Symptoms:  no acid regurgitation and no nausea  Asthma (Follow-Up): The patient is being seen for a routine clinic follow-up of asthma  Interval Symptoms:   Additional History: Using inhaler  Not interested in stopping smoking  Radiculopathy, Cervical (Follow-Up): The patient is being seen for follow-up of cervical radiculopathy  Interval symptoms:  stable neck pain, stable neck stiffness, stable upper arm paresthesias, stable upper arm weakness and stable forearm weakness  Additional history: Saw ortho and had hand injection  Carpal Tunnel Syndrome (Follow-Up): The patient is being seen for follow-up of bilateral carpal tunnel syndrome and Had injection in right hand/wrist  The patient reports no change in the condition  Interval symptoms:   pain,  paresthesias and  weakness  Medications:  the patient is adherent to his medication regimen  Additional history: splints help symptoms at night  Review of Systems  Complete-Male:   Constitutional: no fever  Cardiovascular: no chest pain  Respiratory: cough, wheezing and using inhalers  Active Problems     1  Asthma (493 90) (J45 909)   2  Asthma exacerbation (493 92) (J45 901)   3  Bipolar disorder, mixed (296 60) (F31 60)   4  Carpal tunnel syndrome (354 0) (G56 00)   5  Cervical radiculopathy (723 4) (M54 12)   6  Chronic pain (338 29) (G89 29)   7  Effusion of right knee (719 06) (M25 461)   8  GERD without esophagitis (530 81) (K21 9)   9  Hypertriglyceridemia (272 1) (E78 1)   10  Knee pain (719 46) (M25 569)   11  Marijuana smoker, episodic (305 22) (F12 20)   12  Pain medication agreement (V58 69) (Z79 899)   13  Tick bites (919 4,E906 4) (W57 XXXA)   14  Tobacco abuse (305 1) (Z72 0)    Prepatellar bursitis of right knee (726 65) (M70 41)          Past Medical History    1  History of Acute bronchitis, unspecified organism (466 0) (J20 9)   2  History of Acute URI (465 9) (J06 9)   3  History of Ankle pain, left (719 47) (M25 572)   4  History of Anxiety (300 00) (F41 9)   5  History of Back problem (724 9) (M53 9)   6  History of Dermatitis of foot (692 9) (L30 9)   7  History of Encounter for screening for lipid disorder (V77 91) (Z13 220)   8  History of Fracture of fibula, distal (824 8)   9  History of acute otitis media (V12 49) (Z86 69)   10  History of bronchitis (V12 69) (Z87 09)   11  History of gastroesophageal reflux (GERD) (V12 79) (Z87 19)   12  History of pharyngitis (V12 69) (Z87 09)   13  History of viral gastroenteritis (V12 09) (Z86 19)   14  History of Moderate persistent asthma (493 90) (J45 40)   15  History of Neck problem (V48 9) (R68 89)   16  History of Neurosis, anxiety, panic type (300 01) (F41 0)   17  History of Right knee pain (719 46) (M25 561)   18  History of Tobacco abuse (305 1) (Z72 0)  Active Problems And Past Medical History Reviewed:    The active problems and past medical history were reviewed and updated today  Surgical History    1  History of Oral Surgery Tooth Extraction  Surgical History Reviewed: The surgical history was reviewed and updated today  Family History  Mother    1  No pertinent family history   2  Family history of Ovarian cancer  Unknown    3  No pertinent family history  Family History Reviewed: The family history was reviewed and updated today  Social History    · Current every day smoker (305 1) (F17 200)   · No drug use   · 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)   · Denied: History of Social alcohol use  Social History Reviewed: The social history was reviewed and is unchanged  Current Meds   1  DULoxetine HCl - 30 MG Oral Capsule Delayed Release Particles; TAKE 1 CAPSULE DAILY; Therapy: 67Rpd6471 to (Evaluate:07Xmh1336)  Requested for: 22RJP0950; Last Rx:07Jun2016   Ordered   2  Fluticasone Propionate 50 MCG/ACT Nasal Suspension; USE 1 SPRAY IN EACH NOSTRIL TWICE   DAILY; Therapy: 10XTV7659 to (Last Rx:07Jun2016)  Requested for: 07Jun2016 Ordered   3  Hydrocodone-Acetaminophen 5-325 MG Oral Tablet; TAKE 1 TABLET Every 8 hours; Therapy: 87TCJ2270 to (Evaluate:55Ewk3446); Last Rx:23Jun2016 Ordered   4  Meloxicam 7 5 MG Oral Tablet; TAKE 1 TABLET TWICE DAILY AS NEEDED FOR PAIN;   Therapy: 89VMI2524 to (Adarsh Christine)  Requested for: 39SHT0297; Last Rx:07Jun2016   Ordered   5  Omeprazole 40 MG Oral Capsule Delayed Release; TAKE 1 CAPSULE DAILY  Requested for:   62ZUV1207; Last Rx:07Jun2016 Ordered   6  ProAir  (90 Base) MCG/ACT Inhalation Aerosol Solution; INHALE 1 TO 2 PUFFS EVERY 4 TO   6 HOURS AS NEEDED; Therapy: 33GZD1039 to (Last Rx:07Jun2016)  Requested for: 07Jun2016 Ordered   7  QUEtiapine Fumarate 200 MG Oral Tablet; Therapy: (Recorded:81Qvq8455) to Recorded   8  Symbicort 160-4 5 MCG/ACT Inhalation Aerosol; Inhale 2 puffs twice daily;    Therapy: 73EWD9714 to (Last Rx:07Jun2016)  Requested for: 07Jun2016 Ordered  Medication List Reviewed: The medication list was reviewed and updated today  Allergies    1  No Known Drug Allergies    2  No Known Environmental Allergies   3  No Known Food Allergies    Vitals  Vital Signs    Recorded: 05QQV6830 57:02FD   Systolic 291, LUE, Sitting   Diastolic 68, LUE, Sitting   Heart Rate 78   Temperature 98 4 F, Oral   O2 Saturation 98, RA   Height 5 ft 4 5 in   Weight 148 lb 5 oz   BMI Calculated 25 06   BSA Calculated 1 73     Physical Exam    Constitutional   General appearance: No acute distress, well appearing and well nourished  Alert in NAD  Ears, Nose, Mouth, and Throat   Oropharynx: Normal with no erythema, edema, exudate or lesions  MMM, normal pharynx  Pulmonary   Respiratory effort: Abnormal   Intermittent, late expiratory wheeze  No crackles or rhonchi, no resp distress  + cough  Auscultation of lungs: Abnormal     Cardiovascular   Auscultation of heart: Normal rate and rhythm, normal S1 and S2, without murmurs  RRR  Musculoskeletal   Gait and station: Abnormal   (Right knee, mild suprapatella effusion  Skin overlying is not tight  No erythema  + knee crepitus) Painful AROM cervical spine with paraspinus muscle spasm  AROM bilateral shoulders  Symmetric  and biceps strength  Psychiatric Pleasant cooperative, no Si no HI  good eye contact, mood stable          Future Appointments    Date/Time Provider Specialty Site   10/05/2016 05:00 PM Leah Plunkett MD Internal Medicine [facility]   08/12/2016 03:30 PM Tristin Le DO Orthopedic Surgery [facility] ORTHO SPECIALISTS [facility]   09/07/2016 05:15 PM Sandra Panchal [facility] Internal Medicine [facility]     Signatures   Electronically signed by : Emeka Roberson, [facility]; Jul 5 2016  5:59PM EST                       (Author)    Electronically signed by : Sunni Turner MD; Aug  1 2016 12:27AM EST                       (Validation)

## 2018-01-11 ENCOUNTER — HOSPITAL ENCOUNTER (OUTPATIENT)
Dept: RADIOLOGY | Facility: HOSPITAL | Age: 53
Discharge: HOME | End: 2018-01-11
Payer: COMMERCIAL

## 2018-01-11 DIAGNOSIS — M54.50 LOW BACK PAIN: ICD-10-CM

## 2018-01-11 PROCEDURE — 72110 X-RAY EXAM L-2 SPINE 4/>VWS: CPT

## 2018-01-11 NOTE — MISCELLANEOUS
Message  I attempted to contact Mr Mingo Summers to give him the results of his fluid analysis  His cell/home phone number went right to voicemail which wasn't set up  His work number does not accept incoming phone calls        Signatures   Electronically signed by : Lukas Ray DO; May 13 2016  3:51PM EST                       (Author)

## 2018-01-12 VITALS
HEART RATE: 90 BPM | OXYGEN SATURATION: 98 % | DIASTOLIC BLOOD PRESSURE: 76 MMHG | BODY MASS INDEX: 25.84 KG/M2 | SYSTOLIC BLOOD PRESSURE: 132 MMHG | HEIGHT: 64 IN | TEMPERATURE: 96 F | WEIGHT: 151.38 LBS

## 2018-01-13 VITALS
SYSTOLIC BLOOD PRESSURE: 108 MMHG | DIASTOLIC BLOOD PRESSURE: 74 MMHG | BODY MASS INDEX: 26.12 KG/M2 | HEIGHT: 64 IN | HEART RATE: 101 BPM | WEIGHT: 153 LBS

## 2018-01-13 VITALS
WEIGHT: 155.13 LBS | TEMPERATURE: 98.4 F | BODY MASS INDEX: 26.49 KG/M2 | SYSTOLIC BLOOD PRESSURE: 128 MMHG | HEIGHT: 64 IN | DIASTOLIC BLOOD PRESSURE: 78 MMHG | OXYGEN SATURATION: 97 % | HEART RATE: 89 BPM

## 2018-01-14 VITALS
HEART RATE: 76 BPM | HEIGHT: 64 IN | WEIGHT: 162.25 LBS | BODY MASS INDEX: 27.7 KG/M2 | DIASTOLIC BLOOD PRESSURE: 72 MMHG | SYSTOLIC BLOOD PRESSURE: 120 MMHG

## 2018-01-14 VITALS
BODY MASS INDEX: 26.98 KG/M2 | WEIGHT: 158 LBS | HEIGHT: 64 IN | SYSTOLIC BLOOD PRESSURE: 109 MMHG | HEART RATE: 99 BPM | DIASTOLIC BLOOD PRESSURE: 68 MMHG

## 2018-01-14 NOTE — MISCELLANEOUS
Message  Spoke with Cinda Becerra from [facility] who works with Dr Kane Benoit  Patient has not been following regularly with their office and has been in discussions with their office that psychiatric care was being transitioned to [facility]  [facility] has forwarded records and med list to our office today and his additional assessment earlier  Patients current provider who had been filling his seroquel is not willing to refill this medication because patient has upcoming visit with Dr Kane Benoit  Patient requested he change psychiatrist to see Dr Kane Redding notes patient has not been compliant with his office follow up as of late  Patient will be running out of seroquel and will not be able to get filled until after visit with Dr Kane Benoit  Cinda Becerra is reaching out to our office to see if patient could get refill from our office until his appointment with Dr Kane Benoit  I agreed to filling seroquel x1 month to allow for time for patient to get in with Dr Kane Benoit however I will not be taking on patient's psychiatric care for long term  I requested that once patient has his followup with Dr Kane Benoit, he continue to follow with [facility] and Dr Kane Benoit for his ongoing psychiatric medication management  I requested Dr Kane Benoit reach out to myself to futher discuss this case  Signatures   Electronically signed by : Pamela Garrido, [facility];  Aug 19 2016  6:17PM EST                       (Author)

## 2018-01-15 ENCOUNTER — GENERIC CONVERSION - ENCOUNTER (OUTPATIENT)
Dept: OTHER | Facility: OTHER | Age: 53
End: 2018-01-15

## 2018-01-15 VITALS
HEART RATE: 80 BPM | BODY MASS INDEX: 25.84 KG/M2 | SYSTOLIC BLOOD PRESSURE: 155 MMHG | DIASTOLIC BLOOD PRESSURE: 102 MMHG | HEIGHT: 64 IN | WEIGHT: 151.38 LBS

## 2018-01-16 ENCOUNTER — HOSPITAL ENCOUNTER (OUTPATIENT)
Dept: RADIOLOGY | Facility: HOSPITAL | Age: 53
Discharge: HOME | End: 2018-01-16
Payer: COMMERCIAL

## 2018-01-16 DIAGNOSIS — M54.12 RADICULOPATHY OF CERVICAL REGION: ICD-10-CM

## 2018-01-16 DIAGNOSIS — M50.00 CERVICAL DISC DISORDER WITH MYELOPATHY OF CERVICAL REGION: ICD-10-CM

## 2018-01-16 PROCEDURE — 72050 X-RAY EXAM NECK SPINE 4/5VWS: CPT

## 2018-01-18 NOTE — MISCELLANEOUS
Signatures   Electronically signed by : Daphne Green, ; Aug  7 2017 10:15AM EST                       (Author)

## 2018-01-18 NOTE — MISCELLANEOUS
Message   Recorded as Task   Date: 07/05/2017 02:24 PM, Created By: Xochitl Horner   Task Name: Call Back   Assigned To: [facility],Team   Regarding Patient: Layvonne Aschoff, Status: Active   Comment:    Bushra Acosta - 05 Jul 2017 2:24 PM     TASK CREATED  PLEASE CALL PATIENT TO SCHEDULE APPT  [phone]  Ramos Max 68 Jul 2017 1:06 PM     TASK IN PROGRESS   April Dave - 07 Jul 2017 1:15 PM     TASK EDITED  Intake faxed to [facility] for ins review   Janki Oh - 12 Jul 2017 8:53 AM     TASK REPLIED TO: Previously Assigned To [facility],Team  Intake back from review, ok to schedule per Dr Cordelia Pastrana  Lmom for pt to    Donte Lopez - 12 Jul 2017 11:49 AM     TASK EDITED  Pt called, appt  scheduled for 8/7 at 10am  New packet sent  Active Problems    1  Asthma (493 90) (J45 909)   2  Asthma exacerbation (493 92) (J45 901)   3  Biceps tendonitis, right (726 12) (M75 21)   4  Bipolar disorder, mixed (296 60) (F31 60)   5  Carpal tunnel syndrome (354 0) (G56 00)   6  Cervical radiculopathy (723 4) (M54 12)   7  Chronic pain (338 29) (G89 29)   8  Dyslipidemia (272 4) (E78 5)   9  GERD without esophagitis (530 81) (K21 9)   10  History of neck surgery (V15 29) (Z98 890)   11  HNP (herniated nucleus pulposus) with myelopathy, cervical (722 71) (M50 00)   12  Hypertriglyceridemia (272 1) (E78 1)   13  Incomplete tear of right rotator cuff (840 4) (M75 111)   14  Marijuana smoker, episodic (305 22) (F12 20)   15  Neck pain (723 1) (M54 2)   16  Nicotine dependence (305 1) (F17 200)   17  Pain medication agreement (V58 69) (Z02 89)   18  Prepatellar bursitis of right knee (726 65) (M70 41)   19  Right shoulder injury (959 2) (S49 91XA)   20  Shoulder pain (719 41) (M25 519)   21  Subacromial bursitis (726 19) (M91 50)   22  Tobacco abuse (305 1) (Z72 0)   23  Vitamin D deficiency (268 9) (E55 9)    Current Meds   1   Atorvastatin Calcium 20 MG Oral Tablet (Lipitor); TAKE 1 TABLET DAILY; Therapy: 49TCG2214 to (Lela Hannah)  Requested for: 14THH2228; Last   OF:33ZXK8863 Ordered   2  Bupivacaine HCl - 0 25 % Injection Solution (Marcaine); INJECT 4  ML Intra-articular; To   Be Done: 92YOQ7065; Status: HOLD FOR - Administration Ordered   3  LamoTRIgine 25 MG Oral Tablet; take 2 tablet daily; Therapy: 74KUP2328 to Recorded   4  Meloxicam 7 5 MG Oral Tablet; TAKE 1 TABLET TWICE DAILY AS NEEDED FOR PAIN;   Therapy: 91NXU6496 to (Evaluate:44Muu0906)  Requested for: 23QXM8229; Last   Rx:05Jun2017 Ordered   5  Naproxen 500 MG Oral Tablet; TAKE 1 TABLET TWICE DAILY; Therapy: 21Jun2017 to (Evaluate:20Nfp4429)  Requested for: 21Jun2017; Last   WR:26TEK7549 Ordered   6  Omeprazole 20 MG Oral Capsule Delayed Release; take 1 capsule daily  Requested for:   55OPV4224; Last Rx:05Jun2017 Ordered   7  QUEtiapine Fumarate 100 MG Oral Tablet; TAKE 1 TABLET TWICE DAILY  Requested   for: 10Ejy8166; Last Rx:10Dxh5424 Ordered   8  Symbicort 160-4 5 MCG/ACT Inhalation Aerosol; Inhale 2 puffs twice daily; Therapy: 13ZVG1891 to (Last Rx:43Nla8714)  Requested for: 59DLY4974 Ordered   9  Ventolin  (90 Base) MCG/ACT Inhalation Aerosol Solution; INHALE 1 TO 2   PUFFS EVERY 4 TO 6 HOURS AS NEEDED; Therapy: 37ZME0652 to (Last Rx:05Jun2017)  Requested for: 97YWT5795 Ordered   10  Xanax 0 25 MG Oral Tablet (ALPRAZolam); take 2 tablets by mouth once daily; Therapy: 48BWA8763 to Recorded    Allergies    1  No Known Drug Allergies    2  No Known Environmental Allergies   3   No Known Food Allergies    Signatures   Electronically signed by : Frannie Patel, ; Jul 12 2017 11:50AM EST                       (Author)

## 2018-01-22 VITALS
SYSTOLIC BLOOD PRESSURE: 117 MMHG | WEIGHT: 153 LBS | HEIGHT: 64 IN | HEART RATE: 89 BPM | DIASTOLIC BLOOD PRESSURE: 70 MMHG | BODY MASS INDEX: 26.12 KG/M2

## 2018-01-22 VITALS
HEIGHT: 64 IN | HEART RATE: 85 BPM | TEMPERATURE: 98.7 F | WEIGHT: 165.25 LBS | BODY MASS INDEX: 28.21 KG/M2 | DIASTOLIC BLOOD PRESSURE: 80 MMHG | SYSTOLIC BLOOD PRESSURE: 128 MMHG | OXYGEN SATURATION: 96 %

## 2018-01-22 VITALS
DIASTOLIC BLOOD PRESSURE: 65 MMHG | BODY MASS INDEX: 28.42 KG/M2 | WEIGHT: 166.5 LBS | HEART RATE: 88 BPM | HEIGHT: 64 IN | SYSTOLIC BLOOD PRESSURE: 95 MMHG

## 2018-01-23 ENCOUNTER — APPOINTMENT (OUTPATIENT)
Dept: LAB | Facility: MEDICAL CENTER | Age: 53
End: 2018-01-23
Payer: COMMERCIAL

## 2018-01-23 ENCOUNTER — TRANSCRIBE ORDERS (OUTPATIENT)
Dept: ADMINISTRATIVE | Facility: HOSPITAL | Age: 53
End: 2018-01-23

## 2018-01-23 VITALS
HEART RATE: 83 BPM | SYSTOLIC BLOOD PRESSURE: 120 MMHG | HEIGHT: 64 IN | BODY MASS INDEX: 26.63 KG/M2 | DIASTOLIC BLOOD PRESSURE: 81 MMHG | WEIGHT: 156 LBS

## 2018-01-23 DIAGNOSIS — Z79.899 NEED FOR PROPHYLACTIC CHEMOTHERAPY: ICD-10-CM

## 2018-01-23 DIAGNOSIS — Z79.899 NEED FOR PROPHYLACTIC CHEMOTHERAPY: Primary | ICD-10-CM

## 2018-01-23 LAB
BASOPHILS # BLD AUTO: 0.04 THOUSANDS/ΜL (ref 0–0.1)
BASOPHILS NFR BLD AUTO: 0 % (ref 0–1)
CHOLEST SERPL-MCNC: 157 MG/DL (ref 50–200)
EOSINOPHIL # BLD AUTO: 0.18 THOUSAND/ΜL (ref 0–0.61)
EOSINOPHIL NFR BLD AUTO: 2 % (ref 0–6)
ERYTHROCYTE [DISTWIDTH] IN BLOOD BY AUTOMATED COUNT: 13 % (ref 11.6–15.1)
GLUCOSE P FAST SERPL-MCNC: 90 MG/DL (ref 65–99)
HCT VFR BLD AUTO: 41.8 % (ref 36.5–49.3)
HDLC SERPL-MCNC: 44 MG/DL (ref 40–60)
HGB BLD-MCNC: 14.3 G/DL (ref 12–17)
LDLC SERPL CALC-MCNC: 78 MG/DL (ref 0–100)
LYMPHOCYTES # BLD AUTO: 1.93 THOUSANDS/ΜL (ref 0.6–4.47)
LYMPHOCYTES NFR BLD AUTO: 20 % (ref 14–44)
MCH RBC QN AUTO: 30.9 PG (ref 26.8–34.3)
MCHC RBC AUTO-ENTMCNC: 34.2 G/DL (ref 31.4–37.4)
MCV RBC AUTO: 90 FL (ref 82–98)
MONOCYTES # BLD AUTO: 0.55 THOUSAND/ΜL (ref 0.17–1.22)
MONOCYTES NFR BLD AUTO: 6 % (ref 4–12)
NEUTROPHILS # BLD AUTO: 7 THOUSANDS/ΜL (ref 1.85–7.62)
NEUTS SEG NFR BLD AUTO: 72 % (ref 43–75)
NRBC BLD AUTO-RTO: 0 /100 WBCS
PLATELET # BLD AUTO: 303 THOUSANDS/UL (ref 149–390)
PMV BLD AUTO: 9.9 FL (ref 8.9–12.7)
RBC # BLD AUTO: 4.63 MILLION/UL (ref 3.88–5.62)
T3FREE SERPL-MCNC: 1.63 PG/ML (ref 2.3–4.2)
T4 FREE SERPL-MCNC: 0.48 NG/DL (ref 0.76–1.46)
TRIGL SERPL-MCNC: 177 MG/DL
TSH SERPL DL<=0.05 MIU/L-ACNC: 1.38 UIU/ML (ref 0.36–3.74)
WBC # BLD AUTO: 9.74 THOUSAND/UL (ref 4.31–10.16)

## 2018-01-23 PROCEDURE — 36415 COLL VENOUS BLD VENIPUNCTURE: CPT

## 2018-01-23 PROCEDURE — 84439 ASSAY OF FREE THYROXINE: CPT

## 2018-01-23 PROCEDURE — 84481 FREE ASSAY (FT-3): CPT

## 2018-01-23 PROCEDURE — 84443 ASSAY THYROID STIM HORMONE: CPT

## 2018-01-23 PROCEDURE — 82947 ASSAY GLUCOSE BLOOD QUANT: CPT

## 2018-01-23 PROCEDURE — 85025 COMPLETE CBC W/AUTO DIFF WBC: CPT

## 2018-01-23 PROCEDURE — 80061 LIPID PANEL: CPT

## 2018-01-23 NOTE — MISCELLANEOUS
Provider Comments  Provider Comments:   patient no call no show for appt  lmom      Signatures   Electronically signed by : Jluis Maya [facility]; Dec 12 2017  8:03PM EST                       (Author)

## 2018-01-23 NOTE — RESULT NOTES
Message  Mr Christian Dill came in today wanting surgery for his right prepatellar bursitis  I let him know that I do not think he is a good surgical candidate due to noncompliance  He was previously scheduled for surgery and did not show up for pre-admission testing or surgery  He also did not return any phone calls during this time  I let him know I would see him today, but did not think surgery was an option with me at this time  I offered conservative treatment including aspiration and injection  He declined  I let him know it would be possible for him to be seen by another physician in our group and that they may be willing to consider surgery for him  He declined to make an appointment today  He has the phone number if he changes his mind  He verbalized understanding of the situation        Signatures   Electronically signed by : Edwige Lew DO; Dec 21 2017 10:16AM EST                       (Author)

## 2018-01-24 DIAGNOSIS — J45.909 ASTHMA, UNSPECIFIED ASTHMA SEVERITY, UNSPECIFIED WHETHER COMPLICATED, UNSPECIFIED WHETHER PERSISTENT: Primary | ICD-10-CM

## 2018-01-24 DIAGNOSIS — E78.00 HYPERCHOLESTEROLEMIA: Primary | ICD-10-CM

## 2018-01-24 RX ORDER — ATORVASTATIN CALCIUM 20 MG/1
TABLET, FILM COATED ORAL
Qty: 90 TABLET | Refills: 1 | Status: SHIPPED | OUTPATIENT
Start: 2018-01-24 | End: 2018-02-08 | Stop reason: SDUPTHER

## 2018-02-08 DIAGNOSIS — E78.00 HYPERCHOLESTEROLEMIA: ICD-10-CM

## 2018-02-08 RX ORDER — ATORVASTATIN CALCIUM 20 MG/1
20 TABLET, FILM COATED ORAL DAILY
Qty: 90 TABLET | Refills: 0 | Status: SHIPPED | OUTPATIENT
Start: 2018-02-08 | End: 2018-02-28 | Stop reason: SDUPTHER

## 2018-02-08 RX ORDER — ATORVASTATIN CALCIUM 20 MG/1
TABLET, FILM COATED ORAL
Qty: 90 TABLET | Refills: 1 | OUTPATIENT
Start: 2018-02-08

## 2018-02-08 NOTE — TELEPHONE ENCOUNTER
Pharmacy said Lipitor was never sent to pharmacy  - it doesn't show on medication it was rec by pharm      Patient called and said Symicort is not going to be covered anymore by pharmacy   Patient does not have paper with him right now and which ones are covered but will call and tell us

## 2018-02-14 DIAGNOSIS — J45.909 ASTHMA, UNSPECIFIED ASTHMA SEVERITY, UNSPECIFIED WHETHER COMPLICATED, UNSPECIFIED WHETHER PERSISTENT: ICD-10-CM

## 2018-02-14 RX ORDER — BUDESONIDE AND FORMOTEROL FUMARATE DIHYDRATE 160; 4.5 UG/1; UG/1
2 AEROSOL RESPIRATORY (INHALATION) 2 TIMES DAILY
Qty: 10.2 G | Refills: 0 | Status: CANCELLED | OUTPATIENT
Start: 2018-02-14

## 2018-02-14 RX ORDER — BUDESONIDE AND FORMOTEROL FUMARATE DIHYDRATE 160; 4.5 UG/1; UG/1
AEROSOL RESPIRATORY (INHALATION)
Qty: 10.2 G | Refills: 1 | Status: SHIPPED | OUTPATIENT
Start: 2018-02-14 | End: 2018-02-28 | Stop reason: SDUPTHER

## 2018-02-16 ENCOUNTER — OFFICE VISIT (OUTPATIENT)
Dept: OBGYN CLINIC | Facility: MEDICAL CENTER | Age: 53
End: 2018-02-16
Payer: COMMERCIAL

## 2018-02-16 VITALS
BODY MASS INDEX: 28.46 KG/M2 | HEIGHT: 63 IN | WEIGHT: 160.6 LBS | DIASTOLIC BLOOD PRESSURE: 81 MMHG | SYSTOLIC BLOOD PRESSURE: 134 MMHG | HEART RATE: 78 BPM

## 2018-02-16 DIAGNOSIS — M70.41 PREPATELLAR BURSITIS OF RIGHT KNEE: Primary | ICD-10-CM

## 2018-02-16 PROCEDURE — 99214 OFFICE O/P EST MOD 30 MIN: CPT | Performed by: ORTHOPAEDIC SURGERY

## 2018-02-16 RX ORDER — MELOXICAM 7.5 MG/1
1 TABLET ORAL 2 TIMES DAILY PRN
COMMUNITY
Start: 2011-10-14 | End: 2018-02-25 | Stop reason: SDUPTHER

## 2018-02-16 RX ORDER — BUDESONIDE AND FORMOTEROL FUMARATE DIHYDRATE 160; 4.5 UG/1; UG/1
2 AEROSOL RESPIRATORY (INHALATION) 2 TIMES DAILY
COMMUNITY
Start: 2015-11-11 | End: 2018-06-08 | Stop reason: ALTCHOICE

## 2018-02-16 RX ORDER — ERGOCALCIFEROL 1.25 MG/1
1 CAPSULE ORAL WEEKLY
COMMUNITY
Start: 2018-01-05 | End: 2018-09-26 | Stop reason: SDUPTHER

## 2018-02-16 RX ORDER — LAMOTRIGINE 25 MG/1
2 TABLET ORAL DAILY
COMMUNITY
Start: 2017-02-06 | End: 2018-04-03 | Stop reason: SDUPTHER

## 2018-02-16 RX ORDER — ALPRAZOLAM 0.25 MG/1
2 TABLET ORAL DAILY
COMMUNITY
Start: 2017-02-06 | End: 2018-02-28 | Stop reason: SDUPTHER

## 2018-02-16 RX ORDER — QUETIAPINE FUMARATE 100 MG/1
100 TABLET, FILM COATED ORAL 2 TIMES DAILY
COMMUNITY
End: 2018-04-03 | Stop reason: SDUPTHER

## 2018-02-16 RX ORDER — DULOXETIN HYDROCHLORIDE 60 MG/1
60 CAPSULE, DELAYED RELEASE ORAL EVERY MORNING
Refills: 0 | COMMUNITY
Start: 2018-02-05

## 2018-02-16 RX ORDER — ATORVASTATIN CALCIUM 20 MG/1
20 TABLET, FILM COATED ORAL DAILY
COMMUNITY
Start: 2016-11-28 | End: 2018-07-05 | Stop reason: SDUPTHER

## 2018-02-16 RX ORDER — OMEPRAZOLE 20 MG/1
1 CAPSULE, DELAYED RELEASE ORAL DAILY
COMMUNITY
End: 2018-02-27 | Stop reason: SDUPTHER

## 2018-02-16 RX ORDER — ALBUTEROL SULFATE 90 UG/1
1-2 AEROSOL, METERED RESPIRATORY (INHALATION) EVERY 4 HOURS PRN
COMMUNITY
Start: 2016-01-18 | End: 2018-08-14 | Stop reason: SDUPTHER

## 2018-02-16 NOTE — PROGRESS NOTES
Orthopaedic Surgery - Office Note  Lance Green (89 y o  male)   : 1965   MRN: [id number]  Encounter Date: 2018    Chief Complaint   Patient presents with    Right Knee - Pain, Swelling       Assessment / Plan  R knee prepatellar bursitis    · The diagnosis and treatment options were reviewed  · The patient wishes to proceed with R prepateller bursectomy   · The risks, benefits, and alternatives were discussed  · Written consent was obtained  Return for Recheck 2 weeks post op  History of Present Illness  Lance Green is a 50 y o  male who presents with R knee pain  Pt has previously seen Dr Ho Bates for his R knee prepatellar bursitis  Pt would like to talk about surgery for his knee at this point  Pt has tried 1 corticosteroid injection and has had it aspirated 4 times without relief  Pt states that he is no longer working and is is not aggravating it by kneeling on it or doing activities  Pt states that he is not having any numbness and tingling  Review of Systems  A comprehensive review of systems was negative  Physical Exam  /81   Pulse 78   Ht 5' 3" (1 6 m)   Wt 72 8 kg (160 lb 9 6 oz)   BMI 28 45 kg/m²   Cons: Appears well  No apparent distress  Psych: Alert  Oriented x3  Mood and affect normal   Eyes: PERRLA, EOMI  Resp: Normal effort  No audible wheezing or stridor  CV: Palpable pulse  No discernable arrhythmia  No LE edema  Lymph:  No palpable cervical, axillary, or inguinal lymphadenopathy  Skin: Warm  No palpable masses  No visible lesions  Neuro: Normal muscle tone  Normal and symmetric DTR's  Right Knee Exam  Alignment:  Normal knee alignment  Inspection:  moderate anterior knee swelling  chonric prepateller  edema  No erythema  No ecchymosis  No muscle atrophy  No deformity  Palpation:  mild tenderness at preparellar bursa  no joint line tenderness   ROM:  Normal knee ROM  Strength:  5/5 quadriceps and hamstrings    Stability:  No objective knee instability  Stable Varus / Valgus stress, Lachman, and Posterior drawer  Tests:  No pertinent positive or negative tests  Patella:  Patella tracks centrally without crepitus  Neurovascular:  Sensation intact in DP/SP/[place]/Sa/T nerve distributions  2+ DP & PT pulses  Gait:  Normal     Studies Reviewed  No studies to review    Procedures  No procedures today  Medical, Surgical, Family, and Social History  The patient's medical history, family history, and social history, were reviewed and updated as appropriate  Past Medical History:   Diagnosis Date    Anxiety     Asthma     GERD (gastroesophageal reflux disease)     History of fusion of cervical spine     Hyperlipidemia     Neck pain     and down arms ke    Rotator cuff tear, right     Wears glasses        Past Surgical History:   Procedure Laterality Date    CERVICAL FUSION      [place] SHLDR ARTHROSCOP,SURG,W/ROTAT CUFF REPR Right 7/24/2017    Procedure: ARTHROSCOPY SHOULDER, ROTATOR CUFF REPAIR,OPEN BICEP TENODESIS;  Surgeon: Sharlotte Castleman, MD;  Location: [facility];  Service: Orthopedics       No family history on file  Social History     Occupational History    Not on file       Social History Main Topics    Smoking status: Current Every Day Smoker     Packs/day: 0 25     Years: 10 00     Types: Cigarettes    Smokeless tobacco: Never Used      Comment: 2 ciggs/day    Alcohol use No    Drug use: No    Sexual activity: Not on file       No Known Allergies      Current Outpatient Prescriptions:     albuterol (VENTOLIN HFA) 90 mcg/act inhaler, Inhale 1-2 puffs, Disp: , Rfl:     ALPRAZolam (XANAX) 0 25 mg tablet, Take 0 25 mg by mouth 2 (two) times a day as needed  , Disp: , Rfl:     atorvastatin (LIPITOR) 20 mg tablet, Take 1 tablet (20 mg total) by mouth daily, Disp: 90 tablet, Rfl: 0    budesonide-formoterol (SYMBICORT) 160-4 5 mcg/act inhaler, Inhale 2 puffs 2 (two) times a day, Disp: , Rfl:     DULoxetine (CYMBALTA) 60 mg delayed release capsule, Take 60 mg by mouth every morning, Disp: , Rfl: 0    ergocalciferol (VITAMIN D2) 50,000 units, Take 1 capsule by mouth once a week, Disp: , Rfl:     lamoTRIgine (LaMICtal) 25 mg tablet, Take 25 mg by mouth daily  , Disp: , Rfl:     meloxicam (MOBIC) 7 5 mg tablet, Take 1 tablet by mouth 2 (two) times a day as needed, Disp: , Rfl:     omeprazole (PriLOSEC) 20 mg delayed release capsule, Take 20 mg by mouth daily, Disp: , Rfl:     QUEtiapine (SEROquel) 100 mg tablet, Take 100 mg by mouth daily at bedtime, Disp: , Rfl:     VENTOLIN  (90 Base) MCG/ACT inhaler, inhale 1 to 2 puffs by mouth every 4 to 6 hours if needed, Disp: 18 g, Rfl: 1    ALPRAZolam (XANAX) 0 25 mg tablet, Take 2 tablets by mouth daily, Disp: , Rfl:     atorvastatin (LIPITOR) 20 mg tablet, Take 1 tablet by mouth daily, Disp: , Rfl:     bacitracin topical ointment 500 units/g topical ointment, Apply 1 large application topically 2 (two) times a day, Disp: 15 g, Rfl: 0    lamoTRIgine (LaMICtal) 25 mg tablet, Take 2 tablets by mouth daily, Disp: , Rfl:     omeprazole (PriLOSEC) 20 mg delayed release capsule, Take 1 capsule by mouth daily, Disp: , Rfl:     QUEtiapine (SEROquel) 100 mg tablet, Take 1 tablet by mouth 2 (two) times a day, Disp: , Rfl:     SYMBICORT 160-4 5 MCG/ACT inhaler, inhale 2 puffs by mouth twice a day, Disp: 10 2 g, Rfl: 1      Shilpa Maurice    Scribe Attestation    I,:   Shilpa Maurice am acting as a scribe while in the presence of the attending physician :        I,:   Chiqui Chandra MD personally performed the services described in this documentation    as scribed in my presence :

## 2018-02-25 ENCOUNTER — ANESTHESIA EVENT (OUTPATIENT)
Dept: PERIOP | Facility: HOSPITAL | Age: 53
End: 2018-02-25
Payer: COMMERCIAL

## 2018-02-25 DIAGNOSIS — M70.41 PREPATELLAR BURSITIS OF RIGHT KNEE: ICD-10-CM

## 2018-02-26 NOTE — ANESTHESIA PREPROCEDURE EVALUATION
Review of Systems/Medical History  Patient summary reviewed  Chart reviewed  No history of anesthetic complications     Cardiovascular  Negative cardio ROS Hyperlipidemia,    Pulmonary  Smoker cigarette smoker  , Asthma (wheezes with smoking): well controlled/ stable ,        GI/Hepatic    GERD ,        Negative  ROS        Endo/Other  Negative endo/other ROS      GYN       Hematology  Negative hematology ROS      Musculoskeletal  Back pain (fusion) , cervical pain,   Arthritis     Neurology  Negative neurology ROS      Psychology   Negative psychology ROS Anxiety,              Physical Exam    Airway    Mallampati score: II  TM Distance: >3 FB  Neck ROM: limited     Dental   Comment: No teeth,     Cardiovascular  Comment: Negative ROS, Rhythm: regular, Rate: normal,     Pulmonary  Rhonchi (R chest Cleared with cough), Decreased breath sounds,     Other Findings        Anesthesia Plan  ASA Score- 2     Anesthesia Type- general with ASA Monitors  Additional Monitors:   Airway Plan: LMA  Plan Factors- Patient instructed to abstain from smoking on day of procedure  Patient smoked on day of surgery  Induction- intravenous  Postoperative Plan-     Informed Consent- Anesthetic plan and risks discussed with patient  I personally reviewed this patient with the CRNA  Discussed and agreed on the Anesthesia Plan with the CRNA  Jolene Nguyen

## 2018-02-27 DIAGNOSIS — K21.9 GASTROESOPHAGEAL REFLUX DISEASE WITHOUT ESOPHAGITIS: Primary | ICD-10-CM

## 2018-02-27 DIAGNOSIS — M70.41 PREPATELLAR BURSITIS OF RIGHT KNEE: ICD-10-CM

## 2018-02-27 RX ORDER — MELOXICAM 7.5 MG/1
TABLET ORAL
Qty: 60 TABLET | Refills: 2 | Status: SHIPPED | OUTPATIENT
Start: 2018-02-27 | End: 2018-03-08 | Stop reason: HOSPADM

## 2018-02-27 RX ORDER — OMEPRAZOLE 20 MG/1
CAPSULE, DELAYED RELEASE ORAL
Qty: 30 CAPSULE | Refills: 3 | Status: SHIPPED | OUTPATIENT
Start: 2018-02-27 | End: 2018-02-28 | Stop reason: SDUPTHER

## 2018-02-28 ENCOUNTER — OFFICE VISIT (OUTPATIENT)
Dept: INTERNAL MEDICINE CLINIC | Facility: CLINIC | Age: 53
End: 2018-02-28
Payer: COMMERCIAL

## 2018-02-28 VITALS
WEIGHT: 157.2 LBS | OXYGEN SATURATION: 98 % | SYSTOLIC BLOOD PRESSURE: 118 MMHG | HEIGHT: 63 IN | TEMPERATURE: 98.1 F | DIASTOLIC BLOOD PRESSURE: 64 MMHG | HEART RATE: 81 BPM | BODY MASS INDEX: 27.85 KG/M2

## 2018-02-28 DIAGNOSIS — Z72.0 TOBACCO ABUSE: ICD-10-CM

## 2018-02-28 DIAGNOSIS — J45.31 MILD PERSISTENT ASTHMATIC BRONCHITIS WITH ACUTE EXACERBATION: ICD-10-CM

## 2018-02-28 DIAGNOSIS — M54.50 LOW BACK PAIN WITHOUT SCIATICA, UNSPECIFIED BACK PAIN LATERALITY, UNSPECIFIED CHRONICITY: ICD-10-CM

## 2018-02-28 DIAGNOSIS — R94.6 ABNORMAL THYROID FUNCTION TEST: ICD-10-CM

## 2018-02-28 DIAGNOSIS — F31.60 BIPOLAR DISORDER, MIXED (HCC): ICD-10-CM

## 2018-02-28 DIAGNOSIS — E78.5 DYSLIPIDEMIA: ICD-10-CM

## 2018-02-28 DIAGNOSIS — F17.210 CIGARETTE NICOTINE DEPENDENCE WITHOUT COMPLICATION: ICD-10-CM

## 2018-02-28 DIAGNOSIS — K21.9 GERD WITHOUT ESOPHAGITIS: ICD-10-CM

## 2018-02-28 DIAGNOSIS — Z12.11 SCREENING FOR COLON CANCER: Primary | ICD-10-CM

## 2018-02-28 PROBLEM — M50.00 HNP (HERNIATED NUCLEUS PULPOSUS) WITH MYELOPATHY, CERVICAL: Status: ACTIVE | Noted: 2017-02-06

## 2018-02-28 PROBLEM — M54.2 NECK PAIN: Status: ACTIVE | Noted: 2017-06-05

## 2018-02-28 PROBLEM — Z98.1 S/P CERVICAL SPINAL FUSION: Status: ACTIVE | Noted: 2017-06-28

## 2018-02-28 PROBLEM — M75.111 INCOMPLETE TEAR OF RIGHT ROTATOR CUFF: Status: ACTIVE | Noted: 2017-07-05

## 2018-02-28 PROBLEM — J45.901 ASTHMATIC BRONCHITIS WITH ACUTE EXACERBATION: Status: ACTIVE | Noted: 2018-02-28

## 2018-02-28 PROCEDURE — 99214 OFFICE O/P EST MOD 30 MIN: CPT | Performed by: PHYSICIAN ASSISTANT

## 2018-02-28 PROCEDURE — 3725F SCREEN DEPRESSION PERFORMED: CPT | Performed by: PHYSICIAN ASSISTANT

## 2018-02-28 RX ORDER — PREDNISONE 20 MG/1
40 TABLET ORAL DAILY
Qty: 10 TABLET | Refills: 0 | Status: SHIPPED | OUTPATIENT
Start: 2018-02-28 | End: 2018-03-05

## 2018-02-28 RX ORDER — AZITHROMYCIN 250 MG/1
TABLET, FILM COATED ORAL
Qty: 6 TABLET | Refills: 0 | Status: SHIPPED | OUTPATIENT
Start: 2018-02-28 | End: 2018-03-05

## 2018-02-28 RX ORDER — MELOXICAM 7.5 MG/1
TABLET ORAL
Qty: 60 TABLET | Refills: 2 | Status: SHIPPED | OUTPATIENT
Start: 2018-02-28 | End: 2018-02-28 | Stop reason: SDUPTHER

## 2018-02-28 RX ORDER — OMEPRAZOLE 20 MG/1
CAPSULE, DELAYED RELEASE ORAL
Qty: 30 CAPSULE | Refills: 3 | Status: SHIPPED | OUTPATIENT
Start: 2018-02-28 | End: 2018-03-22 | Stop reason: SDUPTHER

## 2018-02-28 NOTE — ASSESSMENT & PLAN NOTE
Will check a lumbar x-ray  Patient notes he was sent for an MRI of his low back which was not covered by his insurance  May need to complete physical therapy for low back prior to proceeding with MRI  At this time no radicular symptoms on physical exam negative straight leg raise left hold on MRI    Will give referral to physical therapy for low back

## 2018-02-28 NOTE — ASSESSMENT & PLAN NOTE
Reviewed cholesterol with patient  Total cholesterol triglycerides and LDL much improved with the addition of Lipitor  Encourage she continued cut back on smoking

## 2018-02-28 NOTE — ASSESSMENT & PLAN NOTE
Patient 50years old with no prior colonoscopy  Patient is not interested in colonoscopy at this time but is willing to do a fit test to see if there is any blood that he may not see in his stool  If this test is positive he will consider going for colonoscopy to further evaluate    Will await the results of fit testing or given today

## 2018-02-28 NOTE — ASSESSMENT & PLAN NOTE
Start prednisone 40 mg daily for 5 days  Start azithromycin Z-José Luis to be taken as directed  Take prednisone 1st thing in the morning with food  Do not take Mobic (meloxicam) while on prednisone  As the 2 together can cause stomach irritation  Once prednisone 5 day course is complete may resume meloxicam   Continue with Symbicort to be used twice daily as directed      Albuterol inhaler to be used every 4-6 hours as needed for wheeze

## 2018-02-28 NOTE — PROGRESS NOTES
Assessment/Plan:    Asthmatic bronchitis with acute exacerbation   Start prednisone 40 mg daily for 5 days  Start azithromycin Z-José Luis to be taken as directed  Take prednisone 1st thing in the morning with food  Do not take Mobic (meloxicam) while on prednisone  As the 2 together can cause stomach irritation  Once prednisone 5 day course is complete may resume meloxicam   Continue with Symbicort to be used twice daily as directed  Albuterol inhaler to be used every 4-6 hours as needed for wheeze    Low back pain   Will check a lumbar x-ray  Patient notes he was sent for an MRI of his low back which was not covered by his insurance  May need to complete physical therapy for low back prior to proceeding with MRI  At this time no radicular symptoms on physical exam negative straight leg raise left hold on MRI  Will give referral to physical therapy for low back    Dyslipidemia   Reviewed cholesterol with patient  Total cholesterol triglycerides and LDL much improved with the addition of Lipitor  Encourage she continued cut back on smoking  Tobacco abuse   Patient with a  History of marijuana smoke exposure as well as tobacco abuse  Will order a CT of the chest as patient meets criteria with his prior marijuana and tobacco abuse exposure  If insurance does not cover may need to start with chest x-ray 1st     GERD without esophagitis   Symptoms controlled at this time on Prilosec  Continue same medications    Bipolar disorder, mixed ([address] )   As currently following with Dr Silvestre Coy  As well as his therapist who he has been with for several years  Previous psychiatrist Dr Louis Urbina recently passed away and is being filled in for by Dr Silvestre Coy  Screening for colon cancer   Patient 50years old with no prior colonoscopy  Patient is not interested in colonoscopy at this time but is willing to do a fit test to see if there is any blood that he may not see in his stool    If this test is positive he will consider going for colonoscopy to further evaluate  Will await the results of fit testing or given today    Nicotine dependence  Screening CT chest ordered  Encouraged he to stop smoking  Abnormal thyroid function test  Repeat TSH T3 T4  Diagnoses and all orders for this visit:    Screening for colon cancer  -     Ambulatory referral to Gastroenterology; Future  -     Occult Bloood,Fecal Immunochemical; Future    Low back pain without sciatica, unspecified back pain laterality, unspecified chronicity  -     XR spine lumbar 2 or 3 views injury; Future  -     Ambulatory referral to Physical Therapy; Future    Mild persistent asthmatic bronchitis with acute exacerbation  -     azithromycin (ZITHROMAX) 250 mg tablet; Take 2 tablets (500 mg) by mouth day 1  Take 1 tablet (250 mg) by mouth day 2-5  -     predniSONE 20 mg tablet; Take 2 tablets (40 mg total) by mouth daily for 5 days  -     CT chest wo contrast; Future    Dyslipidemia    Tobacco abuse  -     CT chest wo contrast; Future    GERD without esophagitis    Bipolar disorder, mixed (HCC)    Cigarette nicotine dependence without complication  -     CT chest wo contrast; Future    Abnormal thyroid function test  -     TSH, 3rd generation; Future  -     T4, free; Future  -     T3; Future          Subjective:          Patient ID: Samson Tavarez is a 50 y o  male  49 yo male for follow up for labs  Hx of prior low back injury  Prior was focusing on his neck which he had corrected with Dr Betsy King with [facility]  Interested in low back eval   Neck and arm pain improving (better then it had been before the operation)  Notes that low back pain in small of his back  Taking mobic for pain  Went for his labs  Taking cholesterol meds  He notes his psychiatrist passes away  He follows with his therapist still  Prior psychiatrist was Dr Ray Counts  No CP has started with a productive cough  Now with yellow-green mucus  No fever  No shortness of breath  Positive intermittent wheeze  Albuterol inhaler helps  Continues to use Symbicort as directed  Try to cut back on smoking  Has not had pulmonary function testing  No change in bowel or bladder function  Has not had a previous colonoscopy and is not interested 1  He notes he is willing to get a fit test to see if there is any blood that he can't see      Cough   This is a recurrent problem  The current episode started 1 to 4 weeks ago  The problem has been waxing and waning  The problem occurs every few minutes  The cough is productive of sputum (strated to bring up mucus  Trying to cut back on smoking  )  Associated symptoms include wheezing  Pertinent negatives include no chest pain, fever, headaches, heartburn ( resolved with medications), sore throat or shortness of breath  Back Pain   This is a chronic problem  The problem occurs intermittently  The problem has been waxing and waning since onset  The pain is present in the lumbar spine  Radiates to: Radiates on occasion to his hamstrings  Not down into his lower legs or feet  The pain is mild  The pain is worse during the day (Worse with increased walking and increased activity)  Pertinent negatives include no abdominal pain, chest pain, fever, headaches or tingling  Treatments tried: Mobic helps with low back pain  Psychiatric Evaluation   The primary symptoms do not include bizarre behavior  Primary symptoms comment: Follows with Psychiatry has been stable on medications for some time  Additional symptoms of the illness include fatigue  Additional symptoms of the illness do not include insomnia, appetite change, flight of ideas, headaches or abdominal pain  The following portions of the patient's history were reviewed and updated as appropriate: allergies, current medications, past family history, past medical history, past social history, past surgical history and problem list     Review of Systems   Constitutional: Positive for fatigue  Negative for appetite change and fever  HENT: Negative for sore throat  Respiratory: Positive for cough and wheezing  Negative for shortness of breath  Cardiovascular: Negative for chest pain  Gastrointestinal: Negative for abdominal pain, constipation, diarrhea, heartburn ( resolved with medications), nausea and vomiting  No blood in stool no dark tarry stool   Musculoskeletal: Positive for back pain  Neurological: Negative for tingling and headaches  Psychiatric/Behavioral: The patient does not have insomnia  Past Medical History:   Diagnosis Date    Anxiety     Asthma     GERD (gastroesophageal reflux disease)     History of fusion of cervical spine     Hyperlipidemia     Neck pain     and down arms ke    Rotator cuff tear, right     Wears glasses          Current Outpatient Prescriptions:     albuterol (VENTOLIN HFA) 90 mcg/act inhaler, Inhale 1-2 puffs, Disp: , Rfl:     ALPRAZolam (XANAX) 0 25 mg tablet, Take 0 25 mg by mouth 2 (two) times a day as needed  , Disp: , Rfl:     atorvastatin (LIPITOR) 20 mg tablet, Take 1 tablet by mouth daily, Disp: , Rfl:     budesonide-formoterol (SYMBICORT) 160-4 5 mcg/act inhaler, Inhale 2 puffs 2 (two) times a day, Disp: , Rfl:     DULoxetine (CYMBALTA) 60 mg delayed release capsule, Take 60 mg by mouth every morning, Disp: , Rfl: 0    ergocalciferol (VITAMIN D2) 50,000 units, Take 1 capsule by mouth once a week, Disp: , Rfl:     lamoTRIgine (LaMICtal) 25 mg tablet, Take 2 tablets by mouth daily, Disp: , Rfl:     meloxicam (MOBIC) 7 5 mg tablet, take 1 tablet by mouth twice a day with food or milk, Disp: 60 tablet, Rfl: 2    omeprazole (PriLOSEC) 20 mg delayed release capsule, take 1 capsule by mouth once daily, Disp: 30 capsule, Rfl: 3    QUEtiapine (SEROquel) 100 mg tablet, Take 1 tablet by mouth 2 (two) times a day, Disp: , Rfl:     azithromycin (ZITHROMAX) 250 mg tablet, Take 2 tablets (500 mg) by mouth day 1    Take 1 tablet (250 mg) by mouth day 2-5 , Disp: 6 tablet, Rfl: 0    bacitracin topical ointment 500 units/g topical ointment, Apply 1 large application topically 2 (two) times a day, Disp: 15 g, Rfl: 0    predniSONE 20 mg tablet, Take 2 tablets (40 mg total) by mouth daily for 5 days, Disp: 10 tablet, Rfl: 0    No Known Allergies    Social History   Past Surgical History:   Procedure Laterality Date    CERVICAL FUSION      [place] SHLDR ARTHROSCOP,SURG,W/ROTAT CUFF REPR Right 7/24/2017    Procedure: ARTHROSCOPY SHOULDER, ROTATOR CUFF REPAIR,OPEN BICEP TENODESIS;  Surgeon: Paras Ross MD;  Location: [facility];  Service: Orthopedics     History reviewed  No pertinent family history  Objective:  /64 (BP Location: Left arm, Patient Position: Sitting, Cuff Size: Adult)   Pulse 81   Temp 98 1 °F (36 7 °C) (Oral)   Ht 5' 3" (1 6 m)   Wt 71 3 kg (157 lb 3 2 oz)   SpO2 98%   BMI 27 85 kg/m²   Body mass index is 27 85 kg/m²  Physical Exam   Constitutional: He is oriented to person, place, and time  He appears well-developed and well-nourished  No distress  HENT:   Head: Normocephalic and atraumatic  Mouth/Throat: Oropharynx is clear and moist  No oropharyngeal exudate  Eyes: Conjunctivae are normal  Right eye exhibits no discharge  Left eye exhibits no discharge  Neck: Neck supple  Cardiovascular: Normal rate, regular rhythm and normal heart sounds  No murmur heard  Pulmonary/Chest: Effort normal  No apnea  No respiratory distress  He has no decreased breath sounds  He has wheezes ( scattered Intermittent wheeze heard throughout anterior and posteriorly  No respiratory distress  Pulse ox stable on room)  He has rhonchi ( scattered rhonchi patient is able to clear with forceful cough)  He has no rales  Abdominal: Soft  Bowel sounds are normal  There is no tenderness  There is no guarding  Soft nontender positive bowel sounds   Musculoskeletal: He exhibits no edema          Lumbar back: He exhibits decreased range of motion ( decreased extension  Normal forward flexion  No lower extremity weakness  Negative straight  raise bilaterally), tenderness (Normal forward flexion of lumbar sacral spine  Mild decrease extension lumbar spine  Symmetric lower extremity strength  Negative straight leg raise  Mild paraspinal lumbar muscle spasm no CVA tenderness to palpation), pain and spasm  He exhibits no bony tenderness, no swelling, no edema and no deformity  Neurological: He is alert and oriented to person, place, and time  Skin: Skin is warm and dry  No rash noted  He is not diaphoretic  Psychiatric: He has a normal mood and affect  His behavior is normal  Thought content normal    Nursing note and vitals reviewed

## 2018-02-28 NOTE — ASSESSMENT & PLAN NOTE
Patient with a  History of marijuana smoke exposure as well as tobacco abuse  Will order a CT of the chest as patient meets criteria with his prior marijuana and tobacco abuse exposure    If insurance does not cover may need to start with chest x-ray 1st

## 2018-02-28 NOTE — ASSESSMENT & PLAN NOTE
As currently following with Dr Fly Pillai  As well as his therapist who he has been with for several years  Previous psychiatrist Dr Ilya Dunham recently passed away and is being filled in for by Dr Fly Pillai

## 2018-02-28 NOTE — PATIENT INSTRUCTIONS
Asthmatic bronchitis with acute exacerbation   Start prednisone 40 mg daily for 5 days  Start azithromycin Z-José Luis to be taken as directed  Take prednisone 1st thing in the morning with food  Do not take Mobic (meloxicam) while on prednisone  As the 2 together can cause stomach irritation  Once prednisone 5 day course is complete may resume meloxicam   Continue with Symbicort to be used twice daily as directed  Albuterol inhaler to be used every 4-6 hours as needed for wheeze    Low back pain   Will check a lumbar x-ray  Patient notes he was sent for an MRI of his low back which was not covered by his insurance  May need to complete physical therapy for low back prior to proceeding with MRI  At this time no radicular symptoms on physical exam negative straight leg raise left hold on MRI  Will give referral to physical therapy for low back    Dyslipidemia   Reviewed cholesterol with patient  Total cholesterol triglycerides and LDL much improved with the addition of Lipitor  Encourage she continued cut back on smoking  Tobacco abuse   Patient with a  History of marijuana smoke exposure as well as tobacco abuse  Will order a CT of the chest as patient meets criteria with his prior marijuana and tobacco abuse exposure  If insurance does not cover may need to start with chest x-ray 1st     GERD without esophagitis   Symptoms controlled at this time on Prilosec  Continue same medications    Bipolar disorder, mixed ([address] )   As currently following with Dr Bishop Schmid  As well as his therapist who he has been with for several years  Previous psychiatrist Dr Gayatri Thompson recently passed away and is being filled in for by Dr Bishop Schmid  Screening for colon cancer   Patient 50years old with no prior colonoscopy  Patient is not interested in colonoscopy at this time but is willing to do a fit test to see if there is any blood that he may not see in his stool    If this test is positive he will consider going for colonoscopy to further evaluate  Will await the results of fit testing or given today    Nicotine dependence  Screening CT chest ordered  Encouraged he to stop smoking

## 2018-03-04 RX ORDER — SODIUM CHLORIDE 9 MG/ML
125 INJECTION, SOLUTION INTRAVENOUS CONTINUOUS
Status: CANCELLED | OUTPATIENT
Start: 2018-03-08

## 2018-03-05 ENCOUNTER — LAB (OUTPATIENT)
Dept: LAB | Facility: HOSPITAL | Age: 53
End: 2018-03-05
Payer: COMMERCIAL

## 2018-03-05 ENCOUNTER — HOSPITAL ENCOUNTER (OUTPATIENT)
Dept: RADIOLOGY | Facility: HOSPITAL | Age: 53
Discharge: HOME | End: 2018-03-05
Payer: COMMERCIAL

## 2018-03-05 ENCOUNTER — APPOINTMENT (OUTPATIENT)
Dept: PREADMISSION TESTING | Facility: HOSPITAL | Age: 53
End: 2018-03-05
Payer: COMMERCIAL

## 2018-03-05 ENCOUNTER — TRANSCRIBE ORDERS (OUTPATIENT)
Dept: ADMINISTRATIVE | Facility: HOSPITAL | Age: 53
End: 2018-03-05

## 2018-03-05 DIAGNOSIS — Z01.818 PREOP EXAMINATION: ICD-10-CM

## 2018-03-05 DIAGNOSIS — M54.50 LOW BACK PAIN WITHOUT SCIATICA, UNSPECIFIED BACK PAIN LATERALITY, UNSPECIFIED CHRONICITY: ICD-10-CM

## 2018-03-05 DIAGNOSIS — R94.6 ABNORMAL THYROID FUNCTION TEST: ICD-10-CM

## 2018-03-05 DIAGNOSIS — R94.6 ABNORMAL THYROID FUNCTION TEST: Primary | ICD-10-CM

## 2018-03-05 LAB
ANION GAP SERPL CALCULATED.3IONS-SCNC: 12 MMOL/L (ref 4–13)
BASOPHILS # BLD AUTO: 0.01 THOUSANDS/ΜL (ref 0–0.1)
BASOPHILS NFR BLD AUTO: 0 % (ref 0–1)
BUN SERPL-MCNC: 12 MG/DL (ref 5–25)
CALCIUM SERPL-MCNC: 9.1 MG/DL (ref 8.3–10.1)
CHLORIDE SERPL-SCNC: 101 MMOL/L (ref 100–108)
CO2 SERPL-SCNC: 26 MMOL/L (ref 21–32)
CREAT SERPL-MCNC: 0.9 MG/DL (ref 0.6–1.3)
EOSINOPHIL # BLD AUTO: 0 THOUSAND/ΜL (ref 0–0.61)
EOSINOPHIL NFR BLD AUTO: 0 % (ref 0–6)
ERYTHROCYTE [DISTWIDTH] IN BLOOD BY AUTOMATED COUNT: 13.4 % (ref 11.6–15.1)
GFR SERPL CREATININE-BSD FRML MDRD: 99 ML/MIN/1.73SQ M
GLUCOSE SERPL-MCNC: 98 MG/DL (ref 65–140)
HCT VFR BLD AUTO: 39 % (ref 36.5–49.3)
HGB BLD-MCNC: 13.6 G/DL (ref 12–17)
LYMPHOCYTES # BLD AUTO: 1.31 THOUSANDS/ΜL (ref 0.6–4.47)
LYMPHOCYTES NFR BLD AUTO: 11 % (ref 14–44)
MCH RBC QN AUTO: 31.3 PG (ref 26.8–34.3)
MCHC RBC AUTO-ENTMCNC: 34.9 G/DL (ref 31.4–37.4)
MCV RBC AUTO: 90 FL (ref 82–98)
MONOCYTES # BLD AUTO: 0.42 THOUSAND/ΜL (ref 0.17–1.22)
MONOCYTES NFR BLD AUTO: 3 % (ref 4–12)
NEUTROPHILS # BLD AUTO: 10.55 THOUSANDS/ΜL (ref 1.85–7.62)
NEUTS SEG NFR BLD AUTO: 86 % (ref 43–75)
NRBC BLD AUTO-RTO: 0 /100 WBCS
PLATELET # BLD AUTO: 283 THOUSANDS/UL (ref 149–390)
PMV BLD AUTO: 10.4 FL (ref 8.9–12.7)
POTASSIUM SERPL-SCNC: 4.1 MMOL/L (ref 3.5–5.3)
RBC # BLD AUTO: 4.35 MILLION/UL (ref 3.88–5.62)
SODIUM SERPL-SCNC: 139 MMOL/L (ref 136–145)
T4 FREE SERPL-MCNC: 0.51 NG/DL (ref 0.76–1.46)
TSH SERPL DL<=0.05 MIU/L-ACNC: 0.99 UIU/ML (ref 0.36–3.74)
WBC # BLD AUTO: 12.29 THOUSAND/UL (ref 4.31–10.16)

## 2018-03-05 PROCEDURE — 72100 X-RAY EXAM L-S SPINE 2/3 VWS: CPT

## 2018-03-05 PROCEDURE — 93005 ELECTROCARDIOGRAM TRACING: CPT

## 2018-03-05 PROCEDURE — 85025 COMPLETE CBC W/AUTO DIFF WBC: CPT

## 2018-03-05 PROCEDURE — 36415 COLL VENOUS BLD VENIPUNCTURE: CPT

## 2018-03-05 PROCEDURE — 84439 ASSAY OF FREE THYROXINE: CPT

## 2018-03-05 PROCEDURE — 84480 ASSAY TRIIODOTHYRONINE (T3): CPT

## 2018-03-05 PROCEDURE — 80048 BASIC METABOLIC PNL TOTAL CA: CPT

## 2018-03-05 PROCEDURE — 84443 ASSAY THYROID STIM HORMONE: CPT

## 2018-03-05 PROCEDURE — 93010 ELECTROCARDIOGRAM REPORT: CPT | Performed by: INTERNAL MEDICINE

## 2018-03-05 NOTE — PRE-PROCEDURE INSTRUCTIONS
Pre-Surgery Instructions:   Medication Instructions    albuterol (VENTOLIN HFA) 90 mcg/act inhaler Instructed patient per Anesthesia Guidelines   ALPRAZolam (XANAX) 0 25 mg tablet Instructed patient per Anesthesia Guidelines   atorvastatin (LIPITOR) 20 mg tablet Instructed patient per Anesthesia Guidelines   budesonide-formoterol (SYMBICORT) 160-4 5 mcg/act inhaler Instructed patient per Anesthesia Guidelines   DULoxetine (CYMBALTA) 60 mg delayed release capsule Instructed patient per Anesthesia Guidelines   ergocalciferol (VITAMIN D2) 50,000 units Instructed patient per Anesthesia Guidelines   lamoTRIgine (LaMICtal) 25 mg tablet Instructed patient per Anesthesia Guidelines   meloxicam (MOBIC) 7 5 mg tablet Patient was instructed by Physician and understands   omeprazole (PriLOSEC) 20 mg delayed release capsule Instructed patient per Anesthesia Guidelines   predniSONE 20 mg tablet Instructed patient per Anesthesia Guidelines   QUEtiapine (SEROquel) 100 mg tablet Instructed patient per Anesthesia Guidelines  Instructed to take omeprazole/ lamictal/ cymbalta/ seroquel am ofs urgery with sip of water per anesthesia DR SAHNI  Instructed to take xanax if needed  Use symbicort inhaler  am ofs urgery   Use albuterol if needed

## 2018-03-06 ENCOUNTER — TELEPHONE (OUTPATIENT)
Dept: OBGYN CLINIC | Facility: MEDICAL CENTER | Age: 53
End: 2018-03-06

## 2018-03-06 LAB
ATRIAL RATE: 64 BPM
P AXIS: 33 DEGREES
PR INTERVAL: 140 MS
QRS AXIS: 40 DEGREES
QRSD INTERVAL: 78 MS
QT INTERVAL: 352 MS
QTC INTERVAL: 363 MS
T WAVE AXIS: 51 DEGREES
T3 SERPL-MCNC: 0.4 NG/ML (ref 0.6–1.8)
VENTRICULAR RATE: 64 BPM

## 2018-03-08 ENCOUNTER — ANESTHESIA (OUTPATIENT)
Dept: PERIOP | Facility: HOSPITAL | Age: 53
End: 2018-03-08
Payer: COMMERCIAL

## 2018-03-08 ENCOUNTER — HOSPITAL ENCOUNTER (OUTPATIENT)
Facility: HOSPITAL | Age: 53
Setting detail: OUTPATIENT SURGERY
Discharge: HOME | End: 2018-03-08
Attending: ORTHOPAEDIC SURGERY | Admitting: ORTHOPAEDIC SURGERY
Payer: COMMERCIAL

## 2018-03-08 VITALS
HEIGHT: 63 IN | HEART RATE: 84 BPM | RESPIRATION RATE: 18 BRPM | SYSTOLIC BLOOD PRESSURE: 132 MMHG | OXYGEN SATURATION: 96 % | BODY MASS INDEX: 27.78 KG/M2 | TEMPERATURE: 98.4 F | WEIGHT: 156.8 LBS | DIASTOLIC BLOOD PRESSURE: 76 MMHG

## 2018-03-08 DIAGNOSIS — M70.41 PREPATELLAR BURSITIS OF RIGHT KNEE: Primary | ICD-10-CM

## 2018-03-08 PROCEDURE — 88304 TISSUE EXAM BY PATHOLOGIST: CPT | Performed by: PATHOLOGY

## 2018-03-08 PROCEDURE — 27340 REMOVAL OF KNEECAP BURSA: CPT | Performed by: ORTHOPAEDIC SURGERY

## 2018-03-08 RX ORDER — MORPHINE SULFATE 2 MG/ML
2 INJECTION, SOLUTION INTRAMUSCULAR; INTRAVENOUS EVERY 4 HOURS PRN
Status: DISCONTINUED | OUTPATIENT
Start: 2018-03-08 | End: 2018-03-08 | Stop reason: HOSPADM

## 2018-03-08 RX ORDER — SODIUM CHLORIDE 9 MG/ML
125 INJECTION, SOLUTION INTRAVENOUS CONTINUOUS
Status: DISCONTINUED | OUTPATIENT
Start: 2018-03-08 | End: 2018-03-08 | Stop reason: HOSPADM

## 2018-03-08 RX ORDER — ONDANSETRON 2 MG/ML
4 INJECTION INTRAMUSCULAR; INTRAVENOUS ONCE AS NEEDED
Status: DISCONTINUED | OUTPATIENT
Start: 2018-03-08 | End: 2018-03-08 | Stop reason: HOSPADM

## 2018-03-08 RX ORDER — OXYCODONE HYDROCHLORIDE AND ACETAMINOPHEN 5; 325 MG/1; MG/1
1 TABLET ORAL EVERY 4 HOURS PRN
Status: DISCONTINUED | OUTPATIENT
Start: 2018-03-08 | End: 2018-03-08 | Stop reason: HOSPADM

## 2018-03-08 RX ORDER — MIDAZOLAM HYDROCHLORIDE 1 MG/ML
INJECTION INTRAMUSCULAR; INTRAVENOUS AS NEEDED
Status: DISCONTINUED | OUTPATIENT
Start: 2018-03-08 | End: 2018-03-08 | Stop reason: SURG

## 2018-03-08 RX ORDER — EPHEDRINE SULFATE 50 MG/ML
INJECTION, SOLUTION INTRAVENOUS AS NEEDED
Status: DISCONTINUED | OUTPATIENT
Start: 2018-03-08 | End: 2018-03-08 | Stop reason: SURG

## 2018-03-08 RX ORDER — FENTANYL CITRATE/PF 50 MCG/ML
50 SYRINGE (ML) INJECTION
Status: DISCONTINUED | OUTPATIENT
Start: 2018-03-08 | End: 2018-03-08 | Stop reason: HOSPADM

## 2018-03-08 RX ORDER — PROPOFOL 10 MG/ML
INJECTION, EMULSION INTRAVENOUS AS NEEDED
Status: DISCONTINUED | OUTPATIENT
Start: 2018-03-08 | End: 2018-03-08 | Stop reason: SURG

## 2018-03-08 RX ORDER — OXYCODONE HYDROCHLORIDE AND ACETAMINOPHEN 5; 325 MG/1; MG/1
2 TABLET ORAL EVERY 4 HOURS PRN
Status: DISCONTINUED | OUTPATIENT
Start: 2018-03-08 | End: 2018-03-08 | Stop reason: HOSPADM

## 2018-03-08 RX ORDER — FENTANYL CITRATE 50 UG/ML
INJECTION, SOLUTION INTRAMUSCULAR; INTRAVENOUS AS NEEDED
Status: DISCONTINUED | OUTPATIENT
Start: 2018-03-08 | End: 2018-03-08 | Stop reason: SURG

## 2018-03-08 RX ORDER — MAGNESIUM HYDROXIDE 1200 MG/15ML
LIQUID ORAL AS NEEDED
Status: DISCONTINUED | OUTPATIENT
Start: 2018-03-08 | End: 2018-03-08 | Stop reason: HOSPADM

## 2018-03-08 RX ORDER — NAPROXEN 500 MG/1
500 TABLET ORAL 2 TIMES DAILY WITH MEALS
Qty: 60 TABLET | Refills: 0 | Status: SHIPPED | OUTPATIENT
Start: 2018-03-08 | End: 2018-05-17 | Stop reason: SDUPTHER

## 2018-03-08 RX ORDER — ONDANSETRON 2 MG/ML
INJECTION INTRAMUSCULAR; INTRAVENOUS AS NEEDED
Status: DISCONTINUED | OUTPATIENT
Start: 2018-03-08 | End: 2018-03-08 | Stop reason: SURG

## 2018-03-08 RX ORDER — OXYCODONE HYDROCHLORIDE 5 MG/1
5 TABLET ORAL EVERY 4 HOURS PRN
Qty: 45 TABLET | Refills: 0 | Status: SHIPPED | OUTPATIENT
Start: 2018-03-08 | End: 2018-03-18

## 2018-03-08 RX ORDER — PROMETHAZINE HYDROCHLORIDE 12.5 MG/1
12.5 TABLET ORAL EVERY 6 HOURS PRN
Qty: 30 TABLET | Refills: 0 | Status: SHIPPED | OUTPATIENT
Start: 2018-03-08 | End: 2018-09-21 | Stop reason: ALTCHOICE

## 2018-03-08 RX ADMIN — FENTANYL CITRATE 25 MCG: 50 INJECTION, SOLUTION INTRAMUSCULAR; INTRAVENOUS at 09:32

## 2018-03-08 RX ADMIN — DEXAMETHASONE SODIUM PHOSPHATE 4 MG: 10 INJECTION INTRAMUSCULAR; INTRAVENOUS at 09:30

## 2018-03-08 RX ADMIN — ONDANSETRON HYDROCHLORIDE 4 MG: 2 INJECTION, SOLUTION INTRAVENOUS at 09:30

## 2018-03-08 RX ADMIN — PROPOFOL 200 MG: 10 INJECTION, EMULSION INTRAVENOUS at 09:18

## 2018-03-08 RX ADMIN — FENTANYL CITRATE 50 MCG: 50 INJECTION INTRAMUSCULAR; INTRAVENOUS at 10:05

## 2018-03-08 RX ADMIN — LIDOCAINE HYDROCHLORIDE 100 MG: 20 INJECTION, SOLUTION INTRAVENOUS at 09:18

## 2018-03-08 RX ADMIN — FENTANYL CITRATE 25 MCG: 50 INJECTION, SOLUTION INTRAMUSCULAR; INTRAVENOUS at 09:25

## 2018-03-08 RX ADMIN — EPHEDRINE SULFATE 10 MG: 50 INJECTION, SOLUTION INTRAMUSCULAR; INTRAVENOUS; SUBCUTANEOUS at 09:41

## 2018-03-08 RX ADMIN — CEFAZOLIN SODIUM 1000 MG: 1 SOLUTION INTRAVENOUS at 09:20

## 2018-03-08 RX ADMIN — MIDAZOLAM HYDROCHLORIDE 2 MG: 1 INJECTION, SOLUTION INTRAMUSCULAR; INTRAVENOUS at 09:08

## 2018-03-08 RX ADMIN — FENTANYL CITRATE 50 MCG: 50 INJECTION, SOLUTION INTRAMUSCULAR; INTRAVENOUS at 09:18

## 2018-03-08 RX ADMIN — SODIUM CHLORIDE 125 ML/HR: 0.9 INJECTION, SOLUTION INTRAVENOUS at 10:12

## 2018-03-08 RX ADMIN — SODIUM CHLORIDE 125 ML/HR: 0.9 INJECTION, SOLUTION INTRAVENOUS at 07:44

## 2018-03-08 RX ADMIN — EPHEDRINE SULFATE 5 MG: 50 INJECTION, SOLUTION INTRAMUSCULAR; INTRAVENOUS; SUBCUTANEOUS at 09:36

## 2018-03-08 NOTE — OP NOTE
OPERATIVE REPORT    PATIENT NAME: Naveed Moncada   :  1965  MRN: [id number]  Pt Location: [facility] OR ROOM 03    SURGERY DATE: 3/8/2018    SURGEON(S) and ROLE:  Primary: Jorge Medrano MD  Assisting: Salina Reyes PA-C    NOTE:  The presence of a physician assistant was necessary to help with patient positioning, surgical exposure, wound retraction, wound closure, and other key portions of the procedure  No qualified resident was available for this case  PREOPERATIVE DIAGNOSES:  Right Prepatellar Bursitis    POSTOPERATIVE DIAGNOSES:  Right Prepatellar Bursitis    PROCEDURES:  Right Prepatellar Bursectomy      ANESTHESIA TYPE:  General endotracheal and Local anesthetic    ANESTHESIA STAFF:   Anesthesiologist: Karly Araujo MD  CRNA: Cynthia Carlos CRNA    ESTIMATED BLOOD LOSS:  10 mL    TOURNIQUET TIME:  Not used    PERIOPERATIVE ANTIBIOTICS:  cefazolin, 1 gram    IMPLANTS:  none    * No implants in log *    SPECIMENS:    ID Type Source Tests Collected by Time Destination   1 : RIGHT PREPATELLA BURSA Tissue Soft Tissue, Other TISSUE EXAM Jorge Medrano MD 3/8/2018 2188        DRAINS:  None      OPERATIVE INDICATIONS:  The patient is a 50 y o  male with right knee pain due to chronic prepatellar bursitis  Surgical treatment was indicated due to persistent symptoms despite non-surgical treatment  After a thorough discussion of the potential risks, benefits, and alternative treatments, the patient agreed to proceed with surgery  The patient understands that the risks of surgery include, but are not limited to: infection, neurovascular injury, wound healing complications, venous thromboembolism, persistent pain, stiffness, instability, recurrence of symptoms, potential need for additional surgeries, and loss of limb or life  Oral and written consent for surgery was obtained from the patient preoperatively      PROCEDURE AND TECHNIQUE:  On the day of surgery, the patient was identified in the preoperative holding area  The operative site was marked by the surgeon  The patient was taken into the operating room  A time-out was conducted to confirm the patient's identity, the operative site, and the proposed procedure  The patient was anesthetized, and perioperative antibiotics were administered  The patient was positioned supine on the OR table  All bony prominences were padded  A tourniquet was not used  The operative site was prepped and draped using standard sterile technique  A midline incision was made centered over the patella  Dissection was taken through the skin to the bursa  Full-thickness skin flaps were developed and the bursa was released from skin attachments  The entire prepatellar bursa was excised in one piece  The bursa was hypertrophic consistent with chronic inflammation  There was no purulent or crystalline material encountered  The wound was irrigated with sterile saline  The skin was closed in layers  A sterile bandage was applied  The drapes were removed  The patient was awakened from anesthesia        COMPLICATIONS:  None    PATIENT DISPOSITION:  PACU       SIGNATURE:  Caitlyn Encinas MD  DATE:  March 8, 2018  TIME:  12:40 PM

## 2018-03-08 NOTE — PROGRESS NOTES
Pt refused crutches stated "he will be fine, he has them at home and understands all d/c instructions that were verbalized to him and copy of written instructions given  Pt understood to use crutches for balance

## 2018-03-08 NOTE — H&P (VIEW-ONLY)
Orthopaedic Surgery - Office Note  Jarvis Adair (89 y o  male)   : 1965   MRN: [id number]  Encounter Date: 2018    Chief Complaint   Patient presents with    Right Knee - Pain, Swelling       Assessment / Plan  R knee prepatellar bursitis    · The diagnosis and treatment options were reviewed  · The patient wishes to proceed with R prepateller bursectomy   · The risks, benefits, and alternatives were discussed  · Written consent was obtained  Return for Recheck 2 weeks post op  History of Present Illness  Jarvis Adair is a 50 y o  male who presents with R knee pain  Pt has previously seen Dr Jany Felix for his R knee prepatellar bursitis  Pt would like to talk about surgery for his knee at this point  Pt has tried 1 corticosteroid injection and has had it aspirated 4 times without relief  Pt states that he is no longer working and is is not aggravating it by kneeling on it or doing activities  Pt states that he is not having any numbness and tingling  Review of Systems  A comprehensive review of systems was negative  Physical Exam  /81   Pulse 78   Ht 5' 3" (1 6 m)   Wt 72 8 kg (160 lb 9 6 oz)   BMI 28 45 kg/m²   Cons: Appears well  No apparent distress  Psych: Alert  Oriented x3  Mood and affect normal   Eyes: PERRLA, EOMI  Resp: Normal effort  No audible wheezing or stridor  CV: Palpable pulse  No discernable arrhythmia  No LE edema  Lymph:  No palpable cervical, axillary, or inguinal lymphadenopathy  Skin: Warm  No palpable masses  No visible lesions  Neuro: Normal muscle tone  Normal and symmetric DTR's  Right Knee Exam  Alignment:  Normal knee alignment  Inspection:  moderate anterior knee swelling  chonric prepateller  edema  No erythema  No ecchymosis  No muscle atrophy  No deformity  Palpation:  mild tenderness at preparellar bursa  no joint line tenderness   ROM:  Normal knee ROM  Strength:  5/5 quadriceps and hamstrings    Stability:  No objective knee instability  Stable Varus / Valgus stress, Lachman, and Posterior drawer  Tests:  No pertinent positive or negative tests  Patella:  Patella tracks centrally without crepitus  Neurovascular:  Sensation intact in DP/SP/[place]/Sa/T nerve distributions  2+ DP & PT pulses  Gait:  Normal     Studies Reviewed  No studies to review    Procedures  No procedures today  Medical, Surgical, Family, and Social History  The patient's medical history, family history, and social history, were reviewed and updated as appropriate  Past Medical History:   Diagnosis Date    Anxiety     Asthma     GERD (gastroesophageal reflux disease)     History of fusion of cervical spine     Hyperlipidemia     Neck pain     and down arms ke    Rotator cuff tear, right     Wears glasses        Past Surgical History:   Procedure Laterality Date    CERVICAL FUSION      AR SHLDR ARTHROSCOP,SURG,W/ROTAT CUFF REPR Right 7/24/2017    Procedure: ARTHROSCOPY SHOULDER, ROTATOR CUFF REPAIR,OPEN BICEP TENODESIS;  Surgeon: Malika Harvey MD;  Location: [facility];  Service: Orthopedics       No family history on file  Social History     Occupational History    Not on file       Social History Main Topics    Smoking status: Current Every Day Smoker     Packs/day: 0 25     Years: 10 00     Types: Cigarettes    Smokeless tobacco: Never Used      Comment: 2 ciggs/day    Alcohol use No    Drug use: No    Sexual activity: Not on file       No Known Allergies      Current Outpatient Prescriptions:     albuterol (VENTOLIN HFA) 90 mcg/act inhaler, Inhale 1-2 puffs, Disp: , Rfl:     ALPRAZolam (XANAX) 0 25 mg tablet, Take 0 25 mg by mouth 2 (two) times a day as needed  , Disp: , Rfl:     atorvastatin (LIPITOR) 20 mg tablet, Take 1 tablet (20 mg total) by mouth daily, Disp: 90 tablet, Rfl: 0    budesonide-formoterol (SYMBICORT) 160-4 5 mcg/act inhaler, Inhale 2 puffs 2 (two) times a day, Disp: , Rfl:     DULoxetine (CYMBALTA) 60 mg delayed release capsule, Take 60 mg by mouth every morning, Disp: , Rfl: 0    ergocalciferol (VITAMIN D2) 50,000 units, Take 1 capsule by mouth once a week, Disp: , Rfl:     lamoTRIgine (LaMICtal) 25 mg tablet, Take 25 mg by mouth daily  , Disp: , Rfl:     meloxicam (MOBIC) 7 5 mg tablet, Take 1 tablet by mouth 2 (two) times a day as needed, Disp: , Rfl:     omeprazole (PriLOSEC) 20 mg delayed release capsule, Take 20 mg by mouth daily, Disp: , Rfl:     QUEtiapine (SEROquel) 100 mg tablet, Take 100 mg by mouth daily at bedtime, Disp: , Rfl:     VENTOLIN  (90 Base) MCG/ACT inhaler, inhale 1 to 2 puffs by mouth every 4 to 6 hours if needed, Disp: 18 g, Rfl: 1    ALPRAZolam (XANAX) 0 25 mg tablet, Take 2 tablets by mouth daily, Disp: , Rfl:     atorvastatin (LIPITOR) 20 mg tablet, Take 1 tablet by mouth daily, Disp: , Rfl:     bacitracin topical ointment 500 units/g topical ointment, Apply 1 large application topically 2 (two) times a day, Disp: 15 g, Rfl: 0    lamoTRIgine (LaMICtal) 25 mg tablet, Take 2 tablets by mouth daily, Disp: , Rfl:     omeprazole (PriLOSEC) 20 mg delayed release capsule, Take 1 capsule by mouth daily, Disp: , Rfl:     QUEtiapine (SEROquel) 100 mg tablet, Take 1 tablet by mouth 2 (two) times a day, Disp: , Rfl:     SYMBICORT 160-4 5 MCG/ACT inhaler, inhale 2 puffs by mouth twice a day, Disp: 10 2 g, Rfl: 1      Shilpa Jimenez    Scribe Attestation    I,:   Shilpa Jimenez am acting as a scribe while in the presence of the attending physician :        I,:   Wang Chapman MD personally performed the services described in this documentation    as scribed in my presence :

## 2018-03-08 NOTE — DISCHARGE INSTRUCTIONS
POSTOPERATIVE INSTRUCTIONS following KNEE SURGERY    MEDICATIONS:  · Resume all home medications unless otherwise instructed by your surgeon  · Pain Medication:  Oxycodone 5 mg, 1-3 tablets every 3 hours as needed  · If you were given a regional anesthetic (nerve block), please begin taking the pain medication as soon as you get home, even if you have minimal or no pain  DO NOT WAIT FOR THE NERVE BLOCK TO WEAR OFF  · Possible side effects include nausea, constipation, and urinary retention  If you experience these side effects, please call our office for assistance  · Pain med refills are authorized only during office hours (8am-4pm, Mon-Fri)  · Anti-Inflammatory:  Naproxen 500 mg, 1 tablet every 12 hours for 4 weeks  · Take with food  Stop if you experience nausea, reflux, or stomach pain  · Nausea Medication:  Promethazine 12 5 mg, 1 tablet every 6 hours as needed  · Fill prescription ONLY if you expericnce severe nausea  · Blood Clot Prevention:  Not Necessary  · Pump your foot up and down 20 times per hour while you are less mobile  WOUND CARE:  · Keep the dressing clean and dry  Light drainage may occur the first 2 days postop  · You may remove the dressings and get the incision wet in the shower 72 hours after surgery  DO NOT remove steri-strips or sutures  DO NOT immerse the incision under water  Carefully pat the incision dry  If there is wound drainage, re-apply a fresh dry gauze dressing  · Please call our office ([phone]) if you experience either of the following:  · Sudden increase in swelling, redness, or warmth at the surgical site  · Excessive incisional drainage that persists beyond the 3rd day after surgery  · Oral temperature greater than 101 degrees, not relieved with Tylenol  · Shortness of breath, chest pain, nausea, or any other concerning symptoms    SWELLING CONTROL:  · Cold Therapy:   The cold therapy device may be used either continuously or only as needed, according to your preference  Do not let the pad directly touch your skin  Alternatively, apply ice (20 min on, 20 min off) as often as you feel is necessary  · Elevation:  Elevate the entire leg above heart level  Place pillows under your ankle to keep your knee straight  · Compression:  Apply ACE wraps or a thigh-length compression stocking as needed  RANGE OF MOTION:  · You are allowed FULL RANGE OF MOTION as tolerated  IMMOBILIZATION:  · None  You are allowed full range of motion as tolerated  ACTIVITY:   · BEAR FULL WEIGHT AS TOLERATED on the operative leg  Use crutches to assist only as needed  · Using Crutches on Stairs:  Going up, lead with your "good" (nonoperative) leg  Going down, lead with your "bad" (operative) leg  Use a hand rail when available  · Knee Extension:  Place a rolled towel or pillow under your ankle for 20-30 minutes 3-5 times per day  This will help to maintain full knee extension  · Quad Sets:  Sit or lie with your knee straight  Tighten your quadriceps (front thigh) muscle  Hold for 3 seconds, then relax  Repeat 20 times per hour while awake  PHYSICAL THERAPY:  · You will not need physical therapy  FOLLOW-UP APPOINTMENT:  · 2 weeks after surgery with:    [facility]  [address] [facility], [address] [place] [address], [facility] [address], [facility]  [phone] ([facility])                                                                                [phone] (After-Hours)      How to Stop Smoking   WHAT YOU NEED TO KNOW:   Why should I stop smoking? You will improve your health and the health of others around you if you stop smoking  Your risk for heart and lung disease, cancer, stroke, heart attack, and vision problems will also decrease  You can benefit from quitting no matter how long you have smoked  How can I prepare to stop smoking? Nicotine is a highly addictive drug found in cigarettes   Withdrawal symptoms can happen when you stop smoking and make it hard to quit  These include anxiety, depression, irritability, trouble sleeping, and increased appetite  You increase your chances of success if you prepare to quit  · Set a quit date  Mukesh Lopez a date that is within the next 2 weeks  Do not pick a day that you think may be stressful or busy  Write down the day or [place] it on your calender  · Tell friends and family that you plan to quit  Explain that you may have withdrawal symptoms when you try to quit  Ask them to support you  They may be able to encourage you and help reduce your stress to make it easier for you to quit  · Make a list of your reasons for quitting  Put the list somewhere you will see it every day, such as your refrigerator  You can look at the list when you have a craving  · Remove all tobacco and nicotine products from your home, car, and workplace  Also, remove anything else that will tempt you to smoke, such as lighters, matches, or ashtrays  Clean your car, home, and places at work that smell like smoke  The smell of smoke can trigger a craving  · Identify triggers that make you want to smoke  This may include activities, feelings, or people  Also write down 1 way you can deal with each of your triggers  For example, if you want to smoke as soon as you wake up, plan another activity during this time, such as exercise  · Make a plan for how you will quit  Learn about the tools that can help you quit, such as medicine, counseling, or nicotine replacement therapy  Choose at least 2 options to help you quit  What are some tools to help me stop smoking? · Counseling  from a trained healthcare provider can provide you with support and skills to quit smoking  The provider will also teach you to manage your withdrawal symptoms and cravings  You may receive counseling from one counselor, in group therapy, or through phone therapy called a quit line       · Nicotine replacement therapy (NRT)  such as nicotine patches, gum, or lozenges may help reduce your nicotine cravings  You may get these without a doctor's order  Do not use e-cigarettes or smokeless tobacco in place of cigarettes or to help you quit  They still contain nicotine  · Prescription medicines  such as nasal sprays or nicotine inhalers may help reduce your withdrawal symptoms  Other medicines may also be used to reduce your urge to smoke  Ask your healthcare provider about these medicines  You may need to start certain medicines 2 weeks before your quit date for them to work well  · Hypnosis  is a practice that helps guide you through thoughts and feelings  Hypnosis may help decrease your cravings and make you more willing to quit  · Acupuncture therapy  uses very thin needles to balance energy channels in the body  This is thought to help decrease cravings and symptoms of nicotine withdrawal      · Support groups  let you talk to others who are trying to quit or have already quit  It may be helpful to speak with others about how they quit  How can I manage my cravings? · Avoid situations, people, and places that tempt you to smoke  Go to nonsmoking places, such as libraries or restaurants  Understand what tempts you and try to avoid these things  · Keep your hands busy  Hold things such as a stress ball or pen  · Put candy or toothpicks in your mouth  Keep lollipops, sugarless gum, or toothpicks with you at all times  · Do not have alcohol or caffeine  These drinks may tempt you to smoke  Drink healthy liquids such as water or juice instead  · Reward yourself when you resist your cravings  Rewards will motivate you and help you stay positive  · Do an activity that distracts you from your craving  Examples include going for a walk, exercising, or cleaning  What should I know about weight gain after I quit? You may gain a few pounds after you quit smoking   It is healthier for you to gain a few pounds than to continue to smoke  The following can help you prevent weight gain:  · Eat healthy foods  These include fruits, vegetables, whole-grain breads, low-fat dairy products, beans, lean meats, and fish  Eat healthy snacks, such as low-fat yogurt, if you get hungry between meals  · Drink water before, during, and between meals  This will make your stomach feel full and help prevent you from overeating  Ask your healthcare provider how much liquid to drink each day and which liquids are best for you  · Exercise  Take a walk or do some kind of exercise every day  Ask your healthcare provider what exercise is right for you  This may help reduce your cravings and reduce stress  Where can I find support and more information? · [employer]  Phone: 2- 793 - 177-5758  Web Address: www Fon  CARE AGREEMENT:   You have the right to help plan your care  Learn about your health condition and how it may be treated  Discuss treatment options with your caregivers to decide what care you want to receive  You always have the right to refuse treatment  The above information is an  only  It is not intended as medical advice for individual conditions or treatments  Talk to your doctor, nurse or pharmacist before following any medical regimen to see if it is safe and effective for you  © 2017 [employer] Information is for End User's use only and may not be sold, redistributed or otherwise used for commercial purposes  All illustrations and images included in CareNotes® are the copyrighted property of [employer]  or Will Hinson

## 2018-03-21 DIAGNOSIS — R94.6 ABNORMAL THYROID FUNCTION TEST: Primary | ICD-10-CM

## 2018-03-21 NOTE — PROGRESS NOTES
Called to review results with patient  No answer  Left message requesting return call  Patient needs to go for follow-up labs due to continued abnormal thyroid function tests  Labs were ordered (see orders)  Patient also needs to see endocrinology  Tasked nursing to see that patient calls and schedules with [facility] and goes for repeat labs that I ordered today

## 2018-03-21 NOTE — PROGRESS NOTES
Called to review results with patient  No answer  Left message requesting return call  Patient needs to go for follow-up labs due to continued abnormal thyroid function tests  Labs were ordered (see orders)  Patient also needs to see endocrinology  Please contact patient and see that he calls and schedules with [facility] endocrinology and goes for repeat labs that I ordered today  Let me know if any questions    Thanks Santo Maldonado

## 2018-03-22 DIAGNOSIS — K21.9 GASTROESOPHAGEAL REFLUX DISEASE WITHOUT ESOPHAGITIS: ICD-10-CM

## 2018-03-22 RX ORDER — OMEPRAZOLE 20 MG/1
20 CAPSULE, DELAYED RELEASE ORAL DAILY
Qty: 30 CAPSULE | Refills: 3 | Status: SHIPPED | OUTPATIENT
Start: 2018-03-22 | End: 2018-06-08 | Stop reason: SDUPTHER

## 2018-03-22 NOTE — PROGRESS NOTES
Spoke with pt - I explained to him SEVERAL times that his thyroid function is abnormal and that he needs to have more bloodwork done  I gave him the spelling of "endocrinology" and the phone number SEVERAL times as well  He stated that he will need a referral for endocrinology  I advised him to have the bloodwork done, make an appt and then call us back and we will place the referral   5/29 appt with you  confimed with pt

## 2018-03-26 ENCOUNTER — TELEPHONE (OUTPATIENT)
Dept: INTERNAL MEDICINE CLINIC | Facility: CLINIC | Age: 53
End: 2018-03-26

## 2018-03-26 DIAGNOSIS — F17.210 CIGARETTE NICOTINE DEPENDENCE WITHOUT COMPLICATION: Primary | ICD-10-CM

## 2018-03-26 NOTE — TELEPHONE ENCOUNTER
Eve Lechuga ordered a CT chest without contrast for patient Bharat Woodward  His insurance denied the test  He will need a chest Xray first to determine if a CT scan is needed  Can we please order the Xray and then I will call the patient and let him know

## 2018-03-26 NOTE — TELEPHONE ENCOUNTER
moiraom letting this patient know that his CT was denied and that the Xray has been ordered and is in his chart

## 2018-04-03 DIAGNOSIS — F31.60 BIPOLAR DISORDER, MIXED (HCC): Primary | ICD-10-CM

## 2018-04-03 RX ORDER — LAMOTRIGINE 100 MG/1
1 TABLET ORAL EVERY MORNING
Refills: 0 | COMMUNITY
Start: 2018-03-07 | End: 2018-04-03 | Stop reason: SDUPTHER

## 2018-04-03 RX ORDER — LAMOTRIGINE 100 MG/1
100 TABLET ORAL EVERY MORNING
Qty: 5 TABLET | Refills: 0 | Status: SHIPPED | OUTPATIENT
Start: 2018-04-03 | End: 2019-06-14 | Stop reason: ALTCHOICE

## 2018-04-03 RX ORDER — QUETIAPINE FUMARATE 200 MG/1
1 TABLET, FILM COATED ORAL 2 TIMES DAILY
Refills: 0 | COMMUNITY
Start: 2018-03-07 | End: 2018-04-03 | Stop reason: SDUPTHER

## 2018-04-03 RX ORDER — QUETIAPINE FUMARATE 200 MG/1
200 TABLET, FILM COATED ORAL 2 TIMES DAILY
Qty: 10 TABLET | Refills: 0 | Status: SHIPPED | OUTPATIENT
Start: 2018-04-03 | End: 2019-06-14 | Stop reason: SDUPTHER

## 2018-04-03 NOTE — TELEPHONE ENCOUNTER
Spoke to pharmacist at [facility]  Pt is now taking lamictal 100mg qam and seroquel 200mg BID for total daily dose of 400mg    Sent a 5 day supply for authorization to you

## 2018-04-10 ENCOUNTER — OFFICE VISIT (OUTPATIENT)
Dept: OBGYN CLINIC | Facility: MEDICAL CENTER | Age: 53
End: 2018-04-10
Payer: COMMERCIAL

## 2018-04-10 VITALS — DIASTOLIC BLOOD PRESSURE: 78 MMHG | SYSTOLIC BLOOD PRESSURE: 121 MMHG | HEART RATE: 66 BPM

## 2018-04-10 DIAGNOSIS — M70.41 PREPATELLAR BURSITIS OF RIGHT KNEE: Primary | ICD-10-CM

## 2018-04-10 PROCEDURE — 99024 POSTOP FOLLOW-UP VISIT: CPT | Performed by: ORTHOPAEDIC SURGERY

## 2018-04-10 RX ORDER — MELOXICAM 7.5 MG/1
TABLET ORAL
COMMUNITY
Start: 2018-03-22 | End: 2018-05-16

## 2018-04-10 RX ORDER — LIDOCAINE HYDROCHLORIDE 10 MG/ML
5 INJECTION, SOLUTION INFILTRATION; PERINEURAL
Status: COMPLETED | OUTPATIENT
Start: 2018-04-10 | End: 2018-04-10

## 2018-04-10 RX ADMIN — LIDOCAINE HYDROCHLORIDE 5 ML: 10 INJECTION, SOLUTION INFILTRATION; PERINEURAL at 16:07

## 2018-04-10 NOTE — PROGRESS NOTES
Orthopaedic Surgery - Office Note  Alex Carlos (80 y o  male)   : 1965   MRN: [id number]  Encounter Date: 4/10/2018    Chief Complaint   Patient presents with    Right Knee - Post-op       Assessment / Plan  Status post right knee prepatellar bursectomy on 2018    · After discussion of risk and benefits the patient agreed to aspiration of the prepatellar bursa  This was done sterilely and the patient tolerated well  I was able to aspirate 50 mL of clear blood-tinged fluid from that part of the knee  · Following aspiration the patient had Ace wrap applied to the knee for compression and was instructed to wear this at all times during the day and removed at nighttime  · Continue with ice and analgesics as needed  · Continue with activity as tolerated  Return in about 4 weeks (around 2018)  History of Present Illness  Alex Carlos is a 50 y o  male who presents for follow-up status post right knee prepatellar bursectomy on 2018  Patient is doing well at this time has no pain in his knee at this time  He has notice some prepatellar effusion as in the pretty much since the surgery  He denies any redness or drainage from the wound since surgery  He has not used a compression wrap since removing dressings initially  Review of Systems  Pertinent items are noted in HPI  All other systems were reviewed and are negative  Physical Exam  /78   Pulse 66   Cons: Appears well  No apparent distress  Psych: Alert  Oriented x3  Mood and affect normal   Eyes: PERRLA, EOMI  Resp: Normal effort  No audible wheezing or stridor  CV: Palpable pulse  No discernable arrhythmia  No LE edema  Lymph:  No palpable cervical, axillary, or inguinal lymphadenopathy  Skin: Warm  No palpable masses  No visible lesions  Neuro: Normal muscle tone  Normal and symmetric DTR's  Right Knee Exam  Alignment:  Normal knee alignment  Inspection:  Moderate prepatellar effusion with swelling  No erythema  No ecchymosis  Palpation:  No tenderness  Moderate prepatellar effusion  ROM:  Knee Extension 0°  Knee Flexion 120°  Strength:  5/5 quadriceps and hamstrings  Stability:  No objective knee instability  Stable Varus / Valgus stress, Lachman, and Posterior drawer  Tests:  No pertinent positive or negative tests  Patella:  Patella tracks centrally without crepitus  Neurovascular:  Sensation intact in DP/SP/Marmolejo/Sa/T nerve distributions  Sensation intact in all digital nerve distributions  Toes warm and perfused  Gait:  Normal     Studies Reviewed  No studies to review    Large joint arthrocentesis  Date/Time: 4/10/2018 4:07 PM  Consent given by: patient  Supporting Documentation  Indications: joint swelling   Procedure Details  Location: knee - R knee  Needle size: 18 G  Approach: lateral  Medications administered: 5 mL lidocaine 1 %    Aspirate amount: 50 mL  Aspirate: clear and blood-tinged  Patient tolerance: patient tolerated the procedure well with no immediate complications  Dressing:  Sterile dressing applied           Medical, Surgical, Family, and Social History  The patient's medical history, family history, and social history, were reviewed and updated as appropriate      Past Medical History:   Diagnosis Date    Anxiety     Arthritis     Asthma     Edentulous     GERD (gastroesophageal reflux disease)     History of fusion of cervical spine     Hyperlipidemia     Low back pain     states on prednisone for a  few days for back    Prepatellar bursitis, right knee     Smoker     Tinnitus     Wears glasses     reading       Past Surgical History:   Procedure Laterality Date    CERVICAL FUSION      [place] REMOVAL PREPATELLA BURSA Right 3/8/2018    Procedure: EXCISION BURSA PREPATELLAR;  Surgeon: Gabe Cano MD;  Location: [facility] OR;  Service: Orthopedics    [place] SHLDR ARTHROSCOP,SURG,W/ROTAT CUFF REPR Right 7/24/2017    Procedure: ARTHROSCOPY SHOULDER, ROTATOR CUFF REPAIR,OPEN BICEP TENODESARLYN;  Surgeon: Jennifer Bob MD;  [facility];  Service: Orthopedics       History reviewed  No pertinent family history  Social History     Occupational History    Not on file       Social History Main Topics    Smoking status: Current Every Day Smoker     Packs/day: 0 50     Years: 10 00     Types: Cigarettes    Smokeless tobacco: Never Used    Alcohol use Yes      Comment: few x month    Drug use: Yes     Types: Marijuana      Comment: smokes daily, last smoked 3/7/18     Sexual activity: Not on file       No Known Allergies      Current Outpatient Prescriptions:     albuterol (VENTOLIN HFA) 90 mcg/act inhaler, Inhale 1-2 puffs every 4 (four) hours as needed  , Disp: , Rfl:     ALPRAZolam (XANAX) 0 25 mg tablet, Take 0 25 mg by mouth 2 (two) times a day as needed  , Disp: , Rfl:     atorvastatin (LIPITOR) 20 mg tablet, Take 20 mg by mouth daily  , Disp: , Rfl:     budesonide-formoterol (SYMBICORT) 160-4 5 mcg/act inhaler, Inhale 2 puffs 2 (two) times a day, Disp: , Rfl:     DULoxetine (CYMBALTA) 60 mg delayed release capsule, Take 60 mg by mouth every morning, Disp: , Rfl: 0    ergocalciferol (VITAMIN D2) 50,000 units, Take 1 capsule by mouth once a week, Disp: , Rfl:     lamoTRIgine (LaMICtal) 100 mg tablet, Take 1 tablet (100 mg total) by mouth every morning, Disp: 5 tablet, Rfl: 0    meloxicam (MOBIC) 7 5 mg tablet, , Disp: , Rfl:     naproxen (NAPROSYN) 500 mg tablet, Take 1 tablet (500 mg total) by mouth 2 (two) times a day with meals, Disp: 60 tablet, Rfl: 0    omeprazole (PriLOSEC) 20 mg delayed release capsule, Take 1 capsule (20 mg total) by mouth daily, Disp: 30 capsule, Rfl: 3    QUEtiapine (SEROquel) 200 mg tablet, Take 1 tablet (200 mg total) by mouth 2 (two) times a day, Disp: 10 tablet, Rfl: 0    promethazine (PHENERGAN) 12 5 MG tablet, Take 1 tablet (12 5 mg total) by mouth every 6 (six) hours as needed for nausea or vomiting, Disp: 30 tablet, Rfl: 0      Sho Zendejas PA-C    Scribe Attestation    I,:    am acting as a scribe while in the presence of the attending physician :        I,:    personally performed the services described in this documentation    as scribed in my presence :

## 2018-04-18 ENCOUNTER — TELEPHONE (OUTPATIENT)
Dept: INTERNAL MEDICINE CLINIC | Facility: CLINIC | Age: 53
End: 2018-04-18

## 2018-04-23 ENCOUNTER — TELEPHONE (OUTPATIENT)
Dept: INTERNAL MEDICINE CLINIC | Age: 53
End: 2018-04-23

## 2018-04-23 NOTE — TELEPHONE ENCOUNTER
Patient called here at the bath office after being transferred to the bath office  Patient stated he called twice last week to the [place] office and wants him to have a cream for his back    The medication is Licdocaine cream    Pharmacy is [facility] In [address] and [facility]    Please call him back at the mobile phone

## 2018-04-24 DIAGNOSIS — Z72.0 TOBACCO ABUSE: ICD-10-CM

## 2018-04-24 DIAGNOSIS — M54.50 LOW BACK PAIN WITHOUT SCIATICA, UNSPECIFIED BACK PAIN LATERALITY, UNSPECIFIED CHRONICITY: Primary | ICD-10-CM

## 2018-04-24 RX ORDER — LIDOCAINE 40 MG/G
CREAM TOPICAL DAILY PRN
Qty: 30 G | Refills: 0 | Status: SHIPPED | OUTPATIENT
Start: 2018-04-24 | End: 2018-08-13 | Stop reason: SDUPTHER

## 2018-04-24 NOTE — TELEPHONE ENCOUNTER
Lidocain rx sent to pharmacy  Please notify him of CXR script  He can pick it up at any office  This is being done instead of CT chest which he notes insurance is not covering  Also could you see that he goes for repeat labs that were recently ordered and has scheduled with endocrinoloy  Thanks Mike Kirby

## 2018-04-26 ENCOUNTER — APPOINTMENT (OUTPATIENT)
Dept: LAB | Facility: HOSPITAL | Age: 53
End: 2018-04-26
Payer: COMMERCIAL

## 2018-04-26 ENCOUNTER — HOSPITAL ENCOUNTER (OUTPATIENT)
Dept: RADIOLOGY | Facility: HOSPITAL | Age: 53
Discharge: HOME | End: 2018-04-26
Payer: COMMERCIAL

## 2018-04-26 DIAGNOSIS — R94.6 ABNORMAL THYROID FUNCTION TEST: ICD-10-CM

## 2018-04-26 DIAGNOSIS — F17.210 CIGARETTE NICOTINE DEPENDENCE WITHOUT COMPLICATION: ICD-10-CM

## 2018-04-26 LAB
FSH SERPL-ACNC: 3.1 MIU/ML (ref 0.7–10.8)
LH SERPL-ACNC: 1.5 MIU/ML (ref 1.2–10.6)
PROLACTIN SERPL-MCNC: 5.3 NG/ML (ref 2.5–17.4)
T3 SERPL-MCNC: 0.8 NG/ML (ref 0.6–1.8)
T4 FREE SERPL-MCNC: 0.71 NG/DL (ref 0.76–1.46)
TSH SERPL DL<=0.05 MIU/L-ACNC: 0.75 UIU/ML (ref 0.36–3.74)

## 2018-04-26 PROCEDURE — 84442 ASSAY OF THYROID ACTIVITY: CPT

## 2018-04-26 PROCEDURE — 84480 ASSAY TRIIODOTHYRONINE (T3): CPT

## 2018-04-26 PROCEDURE — 36415 COLL VENOUS BLD VENIPUNCTURE: CPT

## 2018-04-26 PROCEDURE — 84439 ASSAY OF FREE THYROXINE: CPT

## 2018-04-26 PROCEDURE — 83002 ASSAY OF GONADOTROPIN (LH): CPT

## 2018-04-26 PROCEDURE — 83001 ASSAY OF GONADOTROPIN (FSH): CPT

## 2018-04-26 PROCEDURE — 84146 ASSAY OF PROLACTIN: CPT

## 2018-04-26 PROCEDURE — 84443 ASSAY THYROID STIM HORMONE: CPT

## 2018-04-26 PROCEDURE — 71046 X-RAY EXAM CHEST 2 VIEWS: CPT

## 2018-04-28 LAB — T4BG SERPL-MCNC: 18 UG/ML (ref 13–39)

## 2018-05-02 ENCOUNTER — TELEPHONE (OUTPATIENT)
Dept: INTERNAL MEDICINE CLINIC | Facility: CLINIC | Age: 53
End: 2018-05-02

## 2018-05-02 NOTE — TELEPHONE ENCOUNTER
Please call patient back and advised him that we are familiar with [facility] providers (  We would recommend Dr Kezia Oneal who is a Missael Oliveira MD) however he may call [facility] central scheduling and ask for a name of an endocrinologist

## 2018-05-02 NOTE — TELEPHONE ENCOUNTER
Pt had called wondering who we could recomend for endocrinology he was referred to [facility] they do not take his insurance, who else is recommended  ?  Please call [phone]

## 2018-05-08 ENCOUNTER — OFFICE VISIT (OUTPATIENT)
Dept: OBGYN CLINIC | Facility: MEDICAL CENTER | Age: 53
End: 2018-05-08

## 2018-05-08 VITALS
HEART RATE: 88 BPM | BODY MASS INDEX: 26.57 KG/M2 | DIASTOLIC BLOOD PRESSURE: 79 MMHG | SYSTOLIC BLOOD PRESSURE: 124 MMHG | WEIGHT: 150 LBS

## 2018-05-08 DIAGNOSIS — M70.41 PREPATELLAR BURSITIS OF RIGHT KNEE: Primary | ICD-10-CM

## 2018-05-08 DIAGNOSIS — E78.00 HYPERCHOLESTEROLEMIA: ICD-10-CM

## 2018-05-08 PROCEDURE — 99024 POSTOP FOLLOW-UP VISIT: CPT | Performed by: ORTHOPAEDIC SURGERY

## 2018-05-08 NOTE — PROGRESS NOTES
Orthopaedic Surgery - Office Note  Daniela Chen (68 y o  male)   : 1965   MRN: [id number]  Encounter Date: 2018    Chief Complaint   Patient presents with    Right Shoulder - Follow-up       Assessment / Plan  s/p R knee prepatellar bursectomy on 3/8/18    · patient will continue to use the compression wrap as needed  He was also instructed to use knee pads if he is going to be doing any kneeling activities   · Patient was also given him shoulder exercises for his R shoulder today to do daily  Return if symptoms worsen or fail to improve  History of Present Illness  Daniela Chen is a 50 y o  male who presents s/p R knee prepatellar bursectomy on 3/8/18  Pt states that overall he is doing well and not experiencing any problems  At his last appointment we aspirated of the prepatellar bursa and the fluid has not returned  Pt states that he is not experiencing any pain or discomfort  He has been using a compression wrap on his knee daily with relief  Pt denies numbness and tingling  Pt is also c/o R shoulder tightness today  Review of Systems  Pertinent items are noted in HPI  All other systems were reviewed and are negative  Physical Exam  /79   Pulse 88   Wt 68 kg (150 lb)   BMI 26 57 kg/m²   Cons: Appears well  No apparent distress  Psych: Alert  Oriented x3  Mood and affect normal   Eyes: PERRLA, EOMI  Resp: Normal effort  No audible wheezing or stridor  CV: Palpable pulse  No discernable arrhythmia  No LE edema  Lymph:  No palpable cervical, axillary, or inguinal lymphadenopathy  Skin: Warm  No palpable masses  No visible lesions  Neuro: Normal muscle tone  Normal and symmetric DTR's  Right Knee Exam  Alignment:  Normal knee alignment  Inspection:  No swelling  No edema  Incision healed  Palpation:  No tenderness  ROM:  Normal knee ROM  Strength:  5/5 quadriceps and hamstrings  Stability:  No objective knee instability   Stable Varus / Valgus stress, Lachman, and Posterior drawer  Tests:  No pertinent positive or negative tests  Patella:  Patella tracks centrally without crepitus  Neurovascular:  Sensation intact in DP/SP/Marmolejo/Sa/T nerve distributions  2+ DP & PT pulses  Gait:  Normal      Right Shoulder Exam  Alignment / Posture:  Normal shoulder posture  Inspection:  No swelling  No edema  No erythema  No ecchymosis  No muscle atrophy  No deformity  Palpation:  mild tenderness at subacromial bursa  ROM:  Normal shoulder ROM  Strength:  Supraspinatus 4/5  Infraspinatus 4/5  Subscapularis 5/5  Stability:  No objective shoulder instability  Tests: (+) Lambert  Neurovascular:  Sensation intact in Ax/R/M/U nerve distributions  2+ radial pulse  Studies Reviewed  No studies to review    Procedures  No procedures today  Medical, Surgical, Family, and Social History  The patient's medical history, family history, and social history, were reviewed and updated as appropriate  Past Medical History:   Diagnosis Date    Anxiety     Arthritis     Asthma     Edentulous     GERD (gastroesophageal reflux disease)     History of fusion of cervical spine     Hyperlipidemia     Low back pain     states on prednisone for a  few days for back    Prepatellar bursitis, right knee     Smoker     Tinnitus     Wears glasses     reading       Past Surgical History:   Procedure Laterality Date    CERVICAL FUSION      [place] REMOVAL PREPATELLA BURSA Right 3/8/2018    Procedure: EXCISION BURSA PREPATELLAR;  Surgeon: Bernardo Johnson MD;  [facility];  Service: Orthopedics    [place] [address] ARTHROSCOP,SURG,W/ROTAT CUFF REPR Right 7/24/2017    Procedure: ARTHROSCOPY SHOULDER, ROTATOR CUFF REPAIR,OPEN BICEP TENODESIS;  Surgeon: Bernardo Johnson MD;  Location[facility];  Service: Orthopedics       Family History   Problem Relation Age of Onset    No Known Problems Mother        Social History     Occupational History    Not on file       Social History Main Topics    Smoking status: Current Every Day Smoker     Packs/day: 0 50     Years: 10 00     Types: Cigarettes    Smokeless tobacco: Never Used    Alcohol use Yes      Comment: few x month    Drug use: Yes     Types: Marijuana      Comment: smokes daily, last smoked 3/7/18     Sexual activity: Not on file       No Known Allergies      Current Outpatient Prescriptions:     albuterol (VENTOLIN HFA) 90 mcg/act inhaler, Inhale 1-2 puffs every 4 (four) hours as needed  , Disp: , Rfl:     ALPRAZolam (XANAX) 0 25 mg tablet, Take 0 25 mg by mouth 2 (two) times a day as needed  , Disp: , Rfl:     atorvastatin (LIPITOR) 20 mg tablet, Take 20 mg by mouth daily  , Disp: , Rfl:     budesonide-formoterol (SYMBICORT) 160-4 5 mcg/act inhaler, Inhale 2 puffs 2 (two) times a day, Disp: , Rfl:     DULoxetine (CYMBALTA) 60 mg delayed release capsule, Take 60 mg by mouth every morning, Disp: , Rfl: 0    ergocalciferol (VITAMIN D2) 50,000 units, Take 1 capsule by mouth once a week, Disp: , Rfl:     lamoTRIgine (LaMICtal) 100 mg tablet, Take 1 tablet (100 mg total) by mouth every morning, Disp: 5 tablet, Rfl: 0    lidocaine (LMX) 4 % cream, Apply topically daily as needed for mild pain To area on low back, Disp: 30 g, Rfl: 0    meloxicam (MOBIC) 7 5 mg tablet, , Disp: , Rfl:     omeprazole (PriLOSEC) 20 mg delayed release capsule, Take 1 capsule (20 mg total) by mouth daily, Disp: 30 capsule, Rfl: 3    QUEtiapine (SEROquel) 200 mg tablet, Take 1 tablet (200 mg total) by mouth 2 (two) times a day, Disp: 10 tablet, Rfl: 0    naproxen (NAPROSYN) 500 mg tablet, Take 1 tablet (500 mg total) by mouth 2 (two) times a day with meals, Disp: 60 tablet, Rfl: 0    promethazine (PHENERGAN) 12 5 MG tablet, Take 1 tablet (12 5 mg total) by mouth every 6 (six) hours as needed for nausea or vomiting, Disp: 30 tablet, Rfl: 0      Tiffanie Massey Attestation    I,:   Afshan Rodriguez am acting as a scribe while in the presence of the attending physician :        I,:   Esther Hoffmann MD personally performed the services described in this documentation    as scribed in my presence :

## 2018-05-10 RX ORDER — ATORVASTATIN CALCIUM 20 MG/1
TABLET, FILM COATED ORAL
Qty: 90 TABLET | Refills: 0 | OUTPATIENT
Start: 2018-05-10

## 2018-05-16 ENCOUNTER — OFFICE VISIT (OUTPATIENT)
Dept: OBGYN CLINIC | Facility: MEDICAL CENTER | Age: 53
End: 2018-05-16

## 2018-05-16 VITALS
WEIGHT: 140 LBS | HEART RATE: 66 BPM | BODY MASS INDEX: 24.8 KG/M2 | SYSTOLIC BLOOD PRESSURE: 120 MMHG | DIASTOLIC BLOOD PRESSURE: 75 MMHG | HEIGHT: 63 IN

## 2018-05-16 DIAGNOSIS — G89.29 CHRONIC RIGHT-SIDED LOW BACK PAIN WITHOUT SCIATICA: Primary | ICD-10-CM

## 2018-05-16 DIAGNOSIS — M54.50 CHRONIC RIGHT-SIDED LOW BACK PAIN WITHOUT SCIATICA: Primary | ICD-10-CM

## 2018-05-16 PROCEDURE — 99214 OFFICE O/P EST MOD 30 MIN: CPT | Performed by: ORTHOPAEDIC SURGERY

## 2018-05-16 NOTE — PROGRESS NOTES
Assessment/Plan:        Patient seen examined by Dr Mike Campos and myself  He presents with axial lower back pain and right sided paraspinal musculature tenderness  Recent x-ray of the lumbar spine shows mild degenerative disc disease at L5-S1 otherwise no acute findings  He has no lumbar radicular symptoms  Neurologically he is intact L2 through S1  At this time we will prescribe formal physical therapy for his lower back as well as anti-inflammatory medication  He can follow up with us on p r n  basis  All questions answered  Problem List Items Addressed This Visit     Chronic right-sided low back pain without sciatica - Primary            Subjective:      Patient ID: Keegan Moya is a 50 y o  male  HPI     The patient is a 61-year-old male who presents for initial evaluation with Dr Mike Campos  He was referred by Dr Aylin Russell for lower back pain  According to the patient he has had chronic lower back pain for several years that is worse with activity and walking long distances  Cannot point to one thing that made this worse  No recent injury no history of back surgery  Anti-inflammatory over-the-counter medications no longer provide relief  He denies any pain distally to lower extremities no numbness tingling no lower extremity weakness  He denies any bowel or bladder incontinence no saddle anesthesia  He has never done formal physical therapy or had injections into his lower back  Of note he had recent right pre-patellar bursectomy done by Dr Aylin Russell  The following portions of the patient's history were reviewed and updated as appropriate: allergies, current medications, past family history, past medical history, past social history, past surgical history and problem list     Review of Systems   Constitutional: Negative for appetite change, chills, diaphoresis and fatigue  Respiratory: Negative for shortness of breath and wheezing      Cardiovascular: Negative for chest pain, palpitations and leg swelling  Gastrointestinal: Negative for abdominal pain, diarrhea and vomiting  Genitourinary: Negative for decreased urine volume and difficulty urinating  Musculoskeletal: Positive for arthralgias and back pain  Skin: Negative for pallor, rash and wound  Neurological: Negative for weakness and numbness  Objective:      /75   Pulse 66   Ht 5' 3" (1 6 m)   Wt 63 5 kg (140 lb)   BMI 24 80 kg/m²          Physical Exam   Constitutional: He is oriented to person, place, and time  He appears well-developed and well-nourished  No distress  HENT:   Head: Normocephalic and atraumatic  Cardiovascular: Intact distal pulses  Pulmonary/Chest: Effort normal    Abdominal: Soft  Musculoskeletal: He exhibits no edema or tenderness  Neurological: He is alert and oriented to person, place, and time  Skin: Skin is warm and dry  No rash noted  He is not diaphoretic  No erythema  No pallor  Psychiatric: He has a normal mood and affect  Nursing note and vitals reviewed  No acute distress  Patient appears unkempt  Gait is normal inspection reveals no open wounds or erythema  There is mild tenderness palpation lumbosacral region as well as right paraspinal musculature  SI joints are nontender  Negative modified straight leg raise bilaterally, negative PEGGY  Strength is 5/5 L2 - S1 bilaterally, sensation equal and intact  Reflexes are normal at L4 and S1 symmetrically  Feet are warm well perfused there is no calf tenderness or pitting edema

## 2018-05-17 DIAGNOSIS — M70.41 PREPATELLAR BURSITIS OF RIGHT KNEE: ICD-10-CM

## 2018-05-17 RX ORDER — NAPROXEN 500 MG/1
500 TABLET ORAL 2 TIMES DAILY PRN
Qty: 60 TABLET | Refills: 0 | Status: SHIPPED | OUTPATIENT
Start: 2018-05-17 | End: 2018-07-02 | Stop reason: SDUPTHER

## 2018-05-17 NOTE — TELEPHONE ENCOUNTER
Pt saw dr Hannah Shelton yesterday who said he would call in naproxen for the pt  Pt didn't have this already called in by [facility]  He would like it sent to the [facility] on [address]  Please call pt when medication has been called in

## 2018-06-08 ENCOUNTER — OFFICE VISIT (OUTPATIENT)
Dept: INTERNAL MEDICINE CLINIC | Facility: CLINIC | Age: 53
End: 2018-06-08
Payer: COMMERCIAL

## 2018-06-08 ENCOUNTER — TELEPHONE (OUTPATIENT)
Dept: INTERNAL MEDICINE CLINIC | Facility: CLINIC | Age: 53
End: 2018-06-08

## 2018-06-08 VITALS
OXYGEN SATURATION: 97 % | DIASTOLIC BLOOD PRESSURE: 80 MMHG | SYSTOLIC BLOOD PRESSURE: 130 MMHG | HEART RATE: 78 BPM | WEIGHT: 147.2 LBS | HEIGHT: 64 IN | BODY MASS INDEX: 25.13 KG/M2 | TEMPERATURE: 98.6 F

## 2018-06-08 DIAGNOSIS — M54.50 LOW BACK PAIN WITHOUT SCIATICA, UNSPECIFIED BACK PAIN LATERALITY, UNSPECIFIED CHRONICITY: ICD-10-CM

## 2018-06-08 DIAGNOSIS — M70.41 PREPATELLAR BURSITIS OF RIGHT KNEE: ICD-10-CM

## 2018-06-08 DIAGNOSIS — F17.210 CIGARETTE NICOTINE DEPENDENCE WITHOUT COMPLICATION: ICD-10-CM

## 2018-06-08 DIAGNOSIS — J45.909 ASTHMA, UNSPECIFIED ASTHMA SEVERITY, UNSPECIFIED WHETHER COMPLICATED, UNSPECIFIED WHETHER PERSISTENT: ICD-10-CM

## 2018-06-08 DIAGNOSIS — G89.29 CHRONIC RIGHT-SIDED LOW BACK PAIN WITHOUT SCIATICA: ICD-10-CM

## 2018-06-08 DIAGNOSIS — K21.9 GERD WITHOUT ESOPHAGITIS: Primary | ICD-10-CM

## 2018-06-08 DIAGNOSIS — R94.6 ABNORMAL THYROID FUNCTION TEST: ICD-10-CM

## 2018-06-08 DIAGNOSIS — E78.5 DYSLIPIDEMIA: ICD-10-CM

## 2018-06-08 DIAGNOSIS — Z72.0 TOBACCO ABUSE: ICD-10-CM

## 2018-06-08 DIAGNOSIS — M54.50 CHRONIC RIGHT-SIDED LOW BACK PAIN WITHOUT SCIATICA: ICD-10-CM

## 2018-06-08 DIAGNOSIS — J45.31 MILD PERSISTENT ASTHMA WITH ACUTE EXACERBATION: Primary | ICD-10-CM

## 2018-06-08 DIAGNOSIS — K21.9 GASTROESOPHAGEAL REFLUX DISEASE WITHOUT ESOPHAGITIS: ICD-10-CM

## 2018-06-08 PROBLEM — J45.901 ASTHMATIC BRONCHITIS WITH ACUTE EXACERBATION: Status: RESOLVED | Noted: 2018-02-28 | Resolved: 2018-06-08

## 2018-06-08 PROCEDURE — 99214 OFFICE O/P EST MOD 30 MIN: CPT | Performed by: PHYSICIAN ASSISTANT

## 2018-06-08 RX ORDER — OMEPRAZOLE 40 MG/1
40 CAPSULE, DELAYED RELEASE ORAL DAILY
Qty: 30 CAPSULE | Refills: 2 | Status: SHIPPED | OUTPATIENT
Start: 2018-06-08 | End: 2018-09-07 | Stop reason: SDUPTHER

## 2018-06-08 RX ORDER — BUDESONIDE AND FORMOTEROL FUMARATE DIHYDRATE 160; 4.5 UG/1; UG/1
2 AEROSOL RESPIRATORY (INHALATION) 2 TIMES DAILY
Qty: 1 INHALER | Refills: 3 | Status: SHIPPED | OUTPATIENT
Start: 2018-06-08 | End: 2018-09-21 | Stop reason: ALTCHOICE

## 2018-06-08 NOTE — PROGRESS NOTES
[facility]  Standard Office Visit  Patient ID: Patt Carpenter    : 1965  Age/Gender: 50 y o  male     DATE: 2018      Assessment/Plan:    GERD without esophagitis  Patient has been running low on Prilosec  Has been getting breaks reflux symptoms since starting Naprosyn 500 mg twice daily  Discussed that he should stop this medication as it can increase gastric irritation  Encouraged him to use Tylenol for pain  Will increase Prilosec from 20 mg to 40 mg once daily  Patient with persistent reflux symptoms and should be evaluated by Gastroenterology for possible upper endoscopy  Referral made today to Gastroenterology as patient needs treatment for GERD as well as recently turning 52 needing screening colonoscopy  Avoid caffeine  Reduce coffee soda carbonated beverages  Avoid spicy acidic foods  Reduce tobacco   EKG performed recently 3/5/2018  Recommended EKG today which patient refuses  Discussed red flag symptoms and ER precautions which need immediate evaluation and treatment should they develop    Asthma    Patient previously had been using Symbicort 2 puffs twice daily which had controlled his asthma symptoms  He is trying to reduce his tobacco abuse  Encouraged him to quit completely  He notes his insurance company may no longer cover Symbicort  He was given samples today of Dulera to use 2 puffs twice daily  Gargle well after use  Script given for Symbicort that he can try and fill at pharmacy  Discussed if this medication is not covered please ask pharmacist for alternatives that are covered by his plan and report back to our office so that a medication adjustment can be made  May cover Tyree Montes as possible alternatives    Dyslipidemia   Stable at this time on Lipitor  Will check lipids with next lab set  Patient notes he is considering transitioning to a new provider that is closer to his home     He does not drive and takes pelvic transportation to get here which  Has been somewhat difficult  Discussed that it is important that he follows with our office or his new PCP regarding chronic conditions  Needs ongoing management  Abnormal thyroid function test   Patient recently went for repeat thyroid and pituitary functioning testing  He has been unable to scheduled with Endocrinology to do having difficulty finding a local endocrinologist that will accept his insurance  Encouraged him to call his insurance provider and have them provide him with a list of excepting endocrinologist locally  Once he has decided on an endocrinologist he would like to see eye will then issue a referral due to abnormal thyroid function testing  Chronic right-sided low back pain without sciatica    Encouraged him to use Tylenol as needed for pain instead of nonsteroidal anti-inflammatories due to their GI side effects    Nicotine dependence   Encourage patient to try and reduce smoking intake with goal to quit smoking completely  Discussed risks of prolonged tobacco abuse       Diagnoses and all orders for this visit:    GERD without esophagitis  -     omeprazole (PriLOSEC) 40 MG capsule; Take 1 capsule (40 mg total) by mouth daily  -     Ambulatory referral to Gastroenterology; Future    Gastroesophageal reflux disease without esophagitis  -     omeprazole (PriLOSEC) 40 MG capsule; Take 1 capsule (40 mg total) by mouth daily    Prepatellar bursitis of right knee  -     budesonide-formoterol (SYMBICORT) 160-4 5 mcg/act inhaler; Inhale 2 puffs 2 (two) times a day    Asthma, unspecified asthma severity, unspecified whether complicated, unspecified whether persistent  -     mometasone-formoterol (DULERA) 100-5 MCG/ACT inhaler; Inhale 2 puffs 2 (two) times a day Rinse mouth after use  -     Spirometry pre and post bronchodilator;  Future    Tobacco abuse    Low back pain without sciatica, unspecified back pain laterality, unspecified chronicity    Dyslipidemia    Abnormal thyroid function test    Chronic right-sided low back pain without sciatica    Cigarette nicotine dependence without complication          Subjective:   Chief Complaint   Patient presents with    Other     Patient wants referral for other pcp and meds refill  Pt has no new complains at this moment         Patient want refill of Promethazine, this med was never orderd or refill by our office  Cassandra Perez is a 50 y o  male who presents to the office on 6/8/2018 for     eval for follow up  Notes he wants to get refill of his medications  He notes he has been taking his heartburn medication as directed  Notes when he runs out of his heartburn medication he starts to get reflux and nausea  When he was in the hospital for his knee he was given promethazine which did help his symptoms  Has been using it as a p r n  He notes his knee pain is improving  Continues to smoke and is not yet ready to quit at this time  Has been following with his psychiatrist as well as his therapist   He denies any recent medication changes to his psychiatric medications  He has not been using Symbicort as he was told he may need to change due to his insurance formulary changing  Is not certain what other medications will be covered  He has been using albuterol for breakthrough symptoms  Intermittently gets cough and wheeze  No recent pulmonary function test     he notes he had an EKG done prior to his recent knee surgery  Asthma   He complains of cough, frequent throat clearing, sputum production and wheezing  There is no chest tightness, difficulty breathing or shortness of breath  This is a chronic problem  The current episode started more than 1 year ago  The problem occurs intermittently  The problem has been waxing and waning  The cough is non-productive  Associated symptoms include heartburn   Pertinent negatives include no chest pain, dyspnea on exertion (  Notes he rides his bike often and is active ) or fever  His symptoms are aggravated by exposure to smoke  He reports significant improvement on treatment  His symptoms are not alleviated by steroid inhaler and beta-agonist  His past medical history is significant for asthma  Past medical history comments: He notes he had a recent chest x-ray done  Back Pain   This is a chronic problem  The problem occurs intermittently  The problem has been gradually improving since onset  The pain is present in the lumbar spine  The pain does not radiate  The pain is mild  Pertinent negatives include no abdominal pain, chest pain, fever, tingling or weakness  He has tried NSAIDs (Given Naprosyn from his orthopedic doctor  Has not been taking Mobic as he was told he should not take this with his Naprosyn ) for the symptoms  The treatment provided moderate relief  The following portions of the patient's history were reviewed and updated as appropriate: allergies, current medications, past family history, past medical history, past social history, past surgical history and problem list     Review of Systems   Constitutional: Negative for fever  Respiratory: Positive for cough, sputum production and wheezing  Negative for shortness of breath  Cardiovascular: Negative for chest pain, dyspnea on exertion (  Notes he rides his bike often and is active ) and palpitations  Gastrointestinal: Positive for heartburn  Negative for abdominal pain, diarrhea and vomiting  Has not had a colonoscopy or upper endoscopy   Musculoskeletal: Positive for back pain  Knee pain improved  Notes he is no longer getting swelling  Back pain improved  Neurological: Negative for dizziness, tingling and weakness           Patient Active Problem List   Diagnosis    Prepatellar bursitis of right knee    Tobacco abuse    GERD without esophagitis    Incomplete tear of right rotator cuff    Low back pain    Neck pain  Nicotine dependence    S/P cervical spinal fusion    Vitamin D deficiency    Hypertriglyceridemia    HNP (herniated nucleus pulposus) with myelopathy, cervical    Dyslipidemia    Cervical radiculopathy    Bipolar disorder, mixed (HCC)    Asthma    Screening for colon cancer    Abnormal thyroid function test    Chronic right-sided low back pain without sciatica       Past Medical History:   Diagnosis Date    Anxiety     Arthritis     Asthma     Edentulous     GERD (gastroesophageal reflux disease)     History of fusion of cervical spine     Hyperlipidemia     Low back pain     states on prednisone for a  few days for back    Prepatellar bursitis, right knee     Smoker     Tinnitus     Wears glasses     reading       Past Surgical History:   Procedure Laterality Date    CERVICAL FUSION      ME REMOVAL PREPATELLA BURSA Right 3/8/2018    Procedure: EXCISION BURSA PREPATELLAR;  Surgeon: Candelario Cronin MD;  Location: [facility];  Service: Orthopedics    ME [address] ARTHROSCOP,SURG,W/ROTAT CUFF REPR Right 7/24/2017    Procedure: ARTHROSCOPY SHOULDER, ROTATOR CUFF REPAIR,OPEN BICEP TENODESIS;  Surgeon: Candelario Cronin MD;  Location: [facility];  Service: Orthopedics         Current Outpatient Prescriptions:     albuterol (VENTOLIN HFA) 90 mcg/act inhaler, Inhale 1-2 puffs every 4 (four) hours as needed  , Disp: , Rfl:     ALPRAZolam (XANAX) 0 25 mg tablet, Take 0 25 mg by mouth 2 (two) times a day as needed  , Disp: , Rfl:     atorvastatin (LIPITOR) 20 mg tablet, Take 20 mg by mouth daily  , Disp: , Rfl:     DULoxetine (CYMBALTA) 60 mg delayed release capsule, Take 60 mg by mouth every morning, Disp: , Rfl: 0    ergocalciferol (VITAMIN D2) 50,000 units, Take 1 capsule by mouth once a week, Disp: , Rfl:     lamoTRIgine (LaMICtal) 100 mg tablet, Take 1 tablet (100 mg total) by mouth every morning, Disp: 5 tablet, Rfl: 0    lidocaine (LMX) 4 % cream, Apply topically daily as needed for mild pain To area on low back, Disp: 30 g, Rfl: 0    naproxen (NAPROSYN) 500 mg tablet, Take 1 tablet (500 mg total) by mouth 2 (two) times a day as needed for mild pain, Disp: 60 tablet, Rfl: 0    omeprazole (PriLOSEC) 40 MG capsule, Take 1 capsule (40 mg total) by mouth daily, Disp: 30 capsule, Rfl: 2    promethazine (PHENERGAN) 12 5 MG tablet, Take 1 tablet (12 5 mg total) by mouth every 6 (six) hours as needed for nausea or vomiting, Disp: 30 tablet, Rfl: 0    QUEtiapine (SEROquel) 200 mg tablet, Take 1 tablet (200 mg total) by mouth 2 (two) times a day, Disp: 10 tablet, Rfl: 0    budesonide-formoterol (SYMBICORT) 160-4 5 mcg/act inhaler, Inhale 2 puffs 2 (two) times a day, Disp: 1 Inhaler, Rfl: 3    mometasone-formoterol (DULERA) 100-5 MCG/ACT inhaler, Inhale 2 puffs 2 (two) times a day Rinse mouth after use , Disp: 8 8 g, Rfl: 0    No Known Allergies    Social History     Social History    Marital status: Single     Spouse name: N/A    Number of children: N/A    Years of education: N/A     Social History Main Topics    Smoking status: Current Every Day Smoker     Packs/day: 0 50     Years: 10 00     Types: Cigarettes    Smokeless tobacco: Never Used    Alcohol use Yes      Comment: few x month    Drug use: Yes     Types: Marijuana      Comment: smokes daily, last smoked 3/7/18     Sexual activity: Not Asked     Other Topics Concern    None     Social History Narrative    None       Family History   Problem Relation Age of Onset    No Known Problems Mother     No Known Problems Father        PHQ-9 Depression Screening    PHQ-9:    Frequency of the following problems over the past two weeks:              Health Maintenance   Topic Date Due    HIV SCREENING  1965    CRC Screening: Colonoscopy  1965    PNEUMOCOCCAL POLYSACCHARIDE VACCINE AGE 2-64 HIGH RISK  06/28/1969    DTaP,Tdap,and Td Vaccines (1 - Tdap) 09/28/2013    INFLUENZA VACCINE  09/01/2018    Depression Screening PHQ-9 02/28/2019       Immunization History   Administered Date(s) Administered    Influenza Quadrivalent, 6-35 Months IM 10/20/2016    Influenza TIV (IM) 10/18/2015    Td (adult), adsorbed 09/27/2013        Objective:  Vitals:    06/08/18 1055   BP: 130/80   BP Location: Left arm   Patient Position: Sitting   Cuff Size: Adult   Pulse: 78   Temp: 98 6 °F (37 °C)   TempSrc: Oral   SpO2: 97%   Weight: 66 8 kg (147 lb 3 2 oz)   Height: 5' 4" (1 626 m)     Wt Readings from Last 3 Encounters:   06/08/18 66 8 kg (147 lb 3 2 oz)   06/01/18 68 4 kg (150 lb 12 8 oz)   05/16/18 63 5 kg (140 lb)     Body mass index is 25 27 kg/m²  No exam data present       Physical Exam   Constitutional: He is oriented to person, place, and time  He appears well-developed and well-nourished  No distress  Alert pleasant cooperative seated in room in no acute distress   HENT:   Head: Normocephalic and atraumatic  Right Ear: External ear normal    Left Ear: External ear normal    Mouth/Throat: Oropharynx is clear and moist  No oropharyngeal exudate  Moist mucous membranes  Normal pharynx   Eyes: Pupils are equal, round, and reactive to light  Right eye exhibits no discharge  Left eye exhibits no discharge  No scleral icterus  Neck: Neck supple  Cardiovascular: Normal rate, regular rhythm and normal heart sounds  No murmur heard  Pulmonary/Chest: Effort normal and breath sounds normal  No accessory muscle usage  No respiratory distress  Abdominal: Soft  Bowel sounds are normal  He exhibits no distension  There is no tenderness  Musculoskeletal: He exhibits no edema  Surgical scar anterior midline right knee  Intact  Nontender  No effusion  Improved forward flexion of LS spine  No lumbar tenderness to palpation   Neurological: He is alert and oriented to person, place, and time  No gross focal neurologic deficits on exam   Skin: Skin is warm and dry  He is not diaphoretic  Abrasions anterior shin bilaterally  Psychiatric: His mood appears not anxious  His speech is not rapid and/or pressured  He is not agitated, not aggressive, not hyperactive and not combative  Thought content is not paranoid  Cognition and memory are not impaired  He does not express impulsivity  He does not exhibit a depressed mood  Appropriate dress  Good eye contact  Nursing note and vitals reviewed  No future appointments      Haider skelton PA-C   [address]    Patient Care Team:  Cherry Greene MD as PCP - General  DO Bernardo Ferro MD

## 2018-06-08 NOTE — ASSESSMENT & PLAN NOTE
Stable at this time on Lipitor  Will check lipids with next lab set  Patient notes he is considering transitioning to a new provider that is closer to his home  He does not drive and takes pelvic transportation to get here which  Has been somewhat difficult  Discussed that it is important that he follows with our office or his new PCP regarding chronic conditions  Needs ongoing management

## 2018-06-08 NOTE — ASSESSMENT & PLAN NOTE
Patient recently went for repeat thyroid and pituitary functioning testing  He has been unable to scheduled with Endocrinology to do having difficulty finding a local endocrinologist that will accept his insurance  Encouraged him to call his insurance provider and have them provide him with a list of excepting endocrinologist locally  Once he has decided on an endocrinologist he would like to see eye will then issue a referral due to abnormal thyroid function testing

## 2018-06-08 NOTE — ASSESSMENT & PLAN NOTE
Encourage patient to try and reduce smoking intake with goal to quit smoking completely    Discussed risks of prolonged tobacco abuse

## 2018-06-08 NOTE — ASSESSMENT & PLAN NOTE
Patient previously had been using Symbicort 2 puffs twice daily which had controlled his asthma symptoms  He is trying to reduce his tobacco abuse  Encouraged him to quit completely  He notes his insurance company may no longer cover Symbicort  He was given samples today of Dulera to use 2 puffs twice daily  Gargle well after use  Script given for Symbicort that he can try and fill at pharmacy  Discussed if this medication is not covered please ask pharmacist for alternatives that are covered by his plan and report back to our office so that a medication adjustment can be made    May cover Clearence Battles as possible alternatives

## 2018-06-08 NOTE — TELEPHONE ENCOUNTER
patient had called asking if we can stop resending the simbicort but do a prior auth  He was told by  pharmacy that it needs prior auth   He uses [facility] [place]

## 2018-06-08 NOTE — ASSESSMENT & PLAN NOTE
Encouraged him to use Tylenol as needed for pain instead of nonsteroidal anti-inflammatories due to their GI side effects

## 2018-06-08 NOTE — PATIENT INSTRUCTIONS
GERD without esophagitis  Patient has been running low on Prilosec  Has been getting breaks reflux symptoms since starting Naprosyn 500 mg twice daily  Discussed that he should stop this medication as it can increase gastric irritation  Encouraged him to use Tylenol for pain  Will increase Prilosec from 20 mg to 40 mg once daily  Patient with persistent reflux symptoms and should be evaluated by Gastroenterology for possible upper endoscopy  Referral made today to Gastroenterology as patient needs treatment for GERD as well as recently turning 52 needing screening colonoscopy  Avoid caffeine  Reduce coffee soda carbonated beverages  Avoid spicy acidic foods  Reduce tobacco     Asthma    Patient previously had been using Symbicort 2 puffs twice daily which had controlled his asthma symptoms  He is trying to reduce his tobacco abuse  Encouraged him to quit completely  He notes his insurance company may no longer cover Symbicort  He was given samples today of Dulera to use 2 puffs twice daily  Gargle well after use  Script given for Symbicort that he can try and fill at pharmacy  Discussed if this medication is not covered please ask pharmacist for alternatives that are covered by his plan and report back to our office so that a medication adjustment can be made  May cover Izabel Kimball as possible alternatives    Dyslipidemia   Stable at this time on Lipitor  Will check lipids with next lab set    Abnormal thyroid function test   Patient recently went for repeat thyroid and pituitary functioning testing  He has been unable to scheduled with Endocrinology to do having difficulty finding a local endocrinologist that will accept his insurance  Encouraged him to call his insurance provider and have them provide him with a list of excepting endocrinologist locally    Once he has decided on an endocrinologist he would like to see eye will then issue a referral due to abnormal thyroid function testing

## 2018-06-08 NOTE — ASSESSMENT & PLAN NOTE
Patient has been running low on Prilosec  Has been getting breaks reflux symptoms since starting Naprosyn 500 mg twice daily  Discussed that he should stop this medication as it can increase gastric irritation  Encouraged him to use Tylenol for pain  Will increase Prilosec from 20 mg to 40 mg once daily  Patient with persistent reflux symptoms and should be evaluated by Gastroenterology for possible upper endoscopy  Referral made today to Gastroenterology as patient needs treatment for GERD as well as recently turning 52 needing screening colonoscopy  Avoid caffeine  Reduce coffee soda carbonated beverages  Avoid spicy acidic foods  Reduce tobacco   EKG performed recently 3/5/2018  Recommended EKG today which patient refuses    Discussed red flag symptoms and ER precautions which need immediate evaluation and treatment should they develop

## 2018-06-13 NOTE — TELEPHONE ENCOUNTER
Symbacort needs prior oth, not covered on patient formulary  Covered options are Advair Diskas 100-50mcg 200-50mcg, and 500-50mcg  Breo Ellipta Aerosole 100-25 mcg and 200-25 mcg and Combivent Respimat Aerosol  mcg  Please advise

## 2018-07-02 DIAGNOSIS — M70.41 PREPATELLAR BURSITIS OF RIGHT KNEE: ICD-10-CM

## 2018-07-02 RX ORDER — NAPROXEN 500 MG/1
TABLET ORAL
Qty: 60 TABLET | Refills: 0 | Status: SHIPPED | OUTPATIENT
Start: 2018-07-02 | End: 2018-08-13 | Stop reason: SDUPTHER

## 2018-07-03 ENCOUNTER — TELEPHONE (OUTPATIENT)
Dept: INTERNAL MEDICINE CLINIC | Facility: CLINIC | Age: 53
End: 2018-07-03

## 2018-07-03 DIAGNOSIS — E78.5 DYSLIPIDEMIA: Primary | ICD-10-CM

## 2018-07-03 DIAGNOSIS — M70.41 PREPATELLAR BURSITIS OF RIGHT KNEE: ICD-10-CM

## 2018-07-03 RX ORDER — ATORVASTATIN CALCIUM 20 MG/1
20 TABLET, FILM COATED ORAL DAILY
Qty: 30 TABLET | Refills: 5 | Status: CANCELLED | OUTPATIENT
Start: 2018-07-03

## 2018-07-03 NOTE — TELEPHONE ENCOUNTER
Needs refill on lipitor medication 20 mg, uses [facility] on [address] in [facility]  Has appt scheduled for 7/13

## 2018-07-05 DIAGNOSIS — E78.00 HYPERCHOLESTEROLEMIA: ICD-10-CM

## 2018-07-05 DIAGNOSIS — M70.41 PREPATELLAR BURSITIS OF RIGHT KNEE: ICD-10-CM

## 2018-07-05 RX ORDER — ATORVASTATIN CALCIUM 20 MG/1
20 TABLET, FILM COATED ORAL DAILY
Qty: 30 TABLET | Refills: 1 | Status: SHIPPED | OUTPATIENT
Start: 2018-07-05 | End: 2018-09-14 | Stop reason: SDUPTHER

## 2018-07-06 RX ORDER — ATORVASTATIN CALCIUM 20 MG/1
20 TABLET, FILM COATED ORAL DAILY
Qty: 90 TABLET | Refills: 1 | OUTPATIENT
Start: 2018-07-06

## 2018-07-10 ENCOUNTER — LAB (OUTPATIENT)
Dept: LAB | Facility: HOSPITAL | Age: 53
End: 2018-07-10
Payer: COMMERCIAL

## 2018-07-10 ENCOUNTER — TRANSCRIBE ORDERS (OUTPATIENT)
Dept: ADMINISTRATIVE | Facility: HOSPITAL | Age: 53
End: 2018-07-10

## 2018-07-10 DIAGNOSIS — Z51.81 ENCOUNTER FOR THERAPEUTIC DRUG MONITORING: ICD-10-CM

## 2018-07-10 DIAGNOSIS — R94.6 ABNORMAL THYROID FUNCTION TEST: ICD-10-CM

## 2018-07-10 DIAGNOSIS — Z51.81 ENCOUNTER FOR THERAPEUTIC DRUG MONITORING: Primary | ICD-10-CM

## 2018-07-10 LAB
ALBUMIN SERPL BCP-MCNC: 3.9 G/DL (ref 3.5–5)
ALP SERPL-CCNC: 89 U/L (ref 46–116)
ALT SERPL W P-5'-P-CCNC: 12 U/L (ref 12–78)
ANION GAP SERPL CALCULATED.3IONS-SCNC: 11 MMOL/L (ref 4–13)
AST SERPL W P-5'-P-CCNC: 12 U/L (ref 5–45)
BILIRUB SERPL-MCNC: 0.22 MG/DL (ref 0.2–1)
BUN SERPL-MCNC: 11 MG/DL (ref 5–25)
CALCIUM SERPL-MCNC: 8.7 MG/DL (ref 8.3–10.1)
CHLORIDE SERPL-SCNC: 104 MMOL/L (ref 100–108)
CHOLEST SERPL-MCNC: 261 MG/DL (ref 50–200)
CO2 SERPL-SCNC: 23 MMOL/L (ref 21–32)
CORTIS AM PEAK SERPL-MCNC: 11.9 UG/DL (ref 4.2–22.4)
CREAT SERPL-MCNC: 0.95 MG/DL (ref 0.6–1.3)
ERYTHROCYTE [DISTWIDTH] IN BLOOD BY AUTOMATED COUNT: 15.3 % (ref 11.6–15.1)
EST. AVERAGE GLUCOSE BLD GHB EST-MCNC: 105 MG/DL
GFR SERPL CREATININE-BSD FRML MDRD: 92 ML/MIN/1.73SQ M
GLUCOSE P FAST SERPL-MCNC: 111 MG/DL (ref 65–99)
HBA1C MFR BLD: 5.3 % (ref 4.2–6.3)
HCT VFR BLD AUTO: 38.7 % (ref 36.5–49.3)
HDLC SERPL-MCNC: 36 MG/DL (ref 40–60)
HGB BLD-MCNC: 12.5 G/DL (ref 12–17)
MCH RBC QN AUTO: 28.7 PG (ref 26.8–34.3)
MCHC RBC AUTO-ENTMCNC: 32.3 G/DL (ref 31.4–37.4)
MCV RBC AUTO: 89 FL (ref 82–98)
NONHDLC SERPL-MCNC: 225 MG/DL
PLATELET # BLD AUTO: 345 THOUSANDS/UL (ref 149–390)
PMV BLD AUTO: 10 FL (ref 8.9–12.7)
POTASSIUM SERPL-SCNC: 4.3 MMOL/L (ref 3.5–5.3)
PROT SERPL-MCNC: 6.6 G/DL (ref 6.4–8.2)
RBC # BLD AUTO: 4.36 MILLION/UL (ref 3.88–5.62)
SODIUM SERPL-SCNC: 138 MMOL/L (ref 136–145)
T3FREE SERPL-MCNC: 2.25 PG/ML (ref 2.3–4.2)
T4 SERPL-MCNC: 4.1 UG/DL (ref 4.7–13.3)
TRIGL SERPL-MCNC: 557 MG/DL
TSH SERPL DL<=0.05 MIU/L-ACNC: 0.8 UIU/ML (ref 0.36–3.74)
WBC # BLD AUTO: 10.65 THOUSAND/UL (ref 4.31–10.16)

## 2018-07-10 PROCEDURE — 84403 ASSAY OF TOTAL TESTOSTERONE: CPT

## 2018-07-10 PROCEDURE — 83036 HEMOGLOBIN GLYCOSYLATED A1C: CPT

## 2018-07-10 PROCEDURE — 80061 LIPID PANEL: CPT

## 2018-07-10 PROCEDURE — 80307 DRUG TEST PRSMV CHEM ANLYZR: CPT | Performed by: PSYCHIATRY & NEUROLOGY

## 2018-07-10 PROCEDURE — 84436 ASSAY OF TOTAL THYROXINE: CPT

## 2018-07-10 PROCEDURE — 84443 ASSAY THYROID STIM HORMONE: CPT

## 2018-07-10 PROCEDURE — 80053 COMPREHEN METABOLIC PANEL: CPT

## 2018-07-10 PROCEDURE — 82533 TOTAL CORTISOL: CPT

## 2018-07-10 PROCEDURE — 36415 COLL VENOUS BLD VENIPUNCTURE: CPT

## 2018-07-10 PROCEDURE — 84402 ASSAY OF FREE TESTOSTERONE: CPT

## 2018-07-10 PROCEDURE — 84481 FREE ASSAY (FT-3): CPT

## 2018-07-10 PROCEDURE — 85027 COMPLETE CBC AUTOMATED: CPT

## 2018-07-11 LAB
TESTOST FREE SERPL-MCNC: 9.6 PG/ML (ref 7.2–24)
TESTOST SERPL-MCNC: 376 NG/DL (ref 264–916)

## 2018-07-14 LAB
AMPHETAMINES UR QL SCN: NEGATIVE NG/ML
BARBITURATES UR QL SCN: NEGATIVE NG/ML
BENZODIAZ UR QL SCN: NEGATIVE
BZE UR QL: NEGATIVE NG/ML
CANNABINOIDS UR QL SCN: POSITIVE
METHADONE UR QL SCN: NEGATIVE NG/ML
OPIATES UR QL: NEGATIVE NG/ML
PCP UR QL: NEGATIVE NG/ML
PROPOXYPH UR QL: NEGATIVE NG/ML

## 2018-07-26 ENCOUNTER — APPOINTMENT (OUTPATIENT)
Dept: WOUND CARE | Facility: HOSPITAL | Age: 53
End: 2018-07-26
Payer: COMMERCIAL

## 2018-07-26 PROCEDURE — 99203 OFFICE O/P NEW LOW 30 MIN: CPT | Performed by: FAMILY MEDICINE

## 2018-08-02 ENCOUNTER — APPOINTMENT (OUTPATIENT)
Dept: LAB | Facility: HOSPITAL | Age: 53
End: 2018-08-02
Payer: COMMERCIAL

## 2018-08-02 ENCOUNTER — TRANSCRIBE ORDERS (OUTPATIENT)
Dept: ADMINISTRATIVE | Facility: HOSPITAL | Age: 53
End: 2018-08-02

## 2018-08-02 DIAGNOSIS — Z12.11 SPECIAL SCREENING FOR MALIGNANT NEOPLASMS, COLON: ICD-10-CM

## 2018-08-02 DIAGNOSIS — Z12.11 SPECIAL SCREENING FOR MALIGNANT NEOPLASMS, COLON: Primary | ICD-10-CM

## 2018-08-02 LAB — HEMOCCULT STL QL IA: NEGATIVE

## 2018-08-02 PROCEDURE — G0328 FECAL BLOOD SCRN IMMUNOASSAY: HCPCS

## 2018-08-10 DIAGNOSIS — M54.50 LOW BACK PAIN WITHOUT SCIATICA, UNSPECIFIED BACK PAIN LATERALITY, UNSPECIFIED CHRONICITY: ICD-10-CM

## 2018-08-10 DIAGNOSIS — M70.41 PREPATELLAR BURSITIS OF RIGHT KNEE: ICD-10-CM

## 2018-08-10 NOTE — TELEPHONE ENCOUNTER
Pt called :  manny put him on a maintance inhaler and a emergency inhaler  ventolin and symbicort  Pharmacy said advair and dulera are maintance inhalers  He has no emergency inhaler at this time  Can you look into this  Please call patient back  He also needs refills, The lidocaine cream 4% he needs refilled- [facility] [address]  His arthritis med naproxen 500mg [facility]  He has an upcoming appt

## 2018-08-14 RX ORDER — NAPROXEN 500 MG/1
500 TABLET ORAL 2 TIMES DAILY PRN
Qty: 60 TABLET | Refills: 0 | Status: SHIPPED | OUTPATIENT
Start: 2018-08-14 | End: 2018-09-21 | Stop reason: ALTCHOICE

## 2018-08-14 RX ORDER — LIDOCAINE 40 MG/G
CREAM TOPICAL DAILY PRN
Qty: 30 G | Refills: 0 | Status: SHIPPED | OUTPATIENT
Start: 2018-08-14 | End: 2018-11-07 | Stop reason: SDUPTHER

## 2018-08-14 RX ORDER — ALBUTEROL SULFATE 90 UG/1
1-2 AEROSOL, METERED RESPIRATORY (INHALATION) EVERY 4 HOURS PRN
Qty: 1 INHALER | Refills: 3 | Status: SHIPPED | OUTPATIENT
Start: 2018-08-14 | End: 2018-11-07 | Stop reason: SDUPTHER

## 2018-08-14 NOTE — TELEPHONE ENCOUNTER
Spoke with patient  Instructed him to only use Advair as a maintenance inhaler and to no longer use Dulera or Symbicort as these are NOT covered by his insurance  I reviewed with patient that he needs to rinse his mouth out with water after he uses Advair to prevent thrush (fungal infection of mouth)  Patient states that ventolin "rescue inhaler" is no longer covered by his insurance  I did call [facility] pharmacy and spoke with pharmacist   He states that patient has not filled Ventolin since April and he cannot say that it is not covered by insurance  Recommendation was to send script and see if it is covered  If it is not, pharmacy will ask for prior auth  Script sent

## 2018-08-15 ENCOUNTER — TELEPHONE (OUTPATIENT)
Dept: INTERNAL MEDICINE CLINIC | Facility: CLINIC | Age: 53
End: 2018-08-15

## 2018-08-15 NOTE — TELEPHONE ENCOUNTER
Patient states that he received a call from the pharmacy that the Lidocaine 4% cream needs pre authorization    Please advised when completed so patient can be contacted  Thank you!

## 2018-08-24 NOTE — TELEPHONE ENCOUNTER
The medication is not a covered benefit under the patient's insurance plan  If he wants it he will have to pay cash for it

## 2018-08-29 ENCOUNTER — OFFICE VISIT (OUTPATIENT)
Dept: GASTROENTEROLOGY | Facility: MEDICAL CENTER | Age: 53
End: 2018-08-29
Payer: COMMERCIAL

## 2018-08-29 VITALS
DIASTOLIC BLOOD PRESSURE: 70 MMHG | WEIGHT: 146 LBS | BODY MASS INDEX: 24.92 KG/M2 | HEIGHT: 64 IN | TEMPERATURE: 97.8 F | HEART RATE: 113 BPM | SYSTOLIC BLOOD PRESSURE: 130 MMHG

## 2018-08-29 DIAGNOSIS — Z12.11 SCREENING FOR COLON CANCER: Primary | ICD-10-CM

## 2018-08-29 DIAGNOSIS — K21.9 GERD WITHOUT ESOPHAGITIS: ICD-10-CM

## 2018-08-29 PROCEDURE — 99243 OFF/OP CNSLTJ NEW/EST LOW 30: CPT | Performed by: INTERNAL MEDICINE

## 2018-08-29 RX ORDER — MELOXICAM 7.5 MG/1
7.5 TABLET ORAL DAILY
Refills: 0 | COMMUNITY
Start: 2018-08-04 | End: 2018-09-21 | Stop reason: ALTCHOICE

## 2018-08-29 NOTE — PATIENT INSTRUCTIONS
The patient is scheduled on 10/2/18 for an EGD with Dr Alex Barry at [place] [id number]  Prep was gone over with by the MA

## 2018-08-29 NOTE — PROGRESS NOTES
[facility] - Outpatient Consultation  Lisandro Ann 48 y o  male MRN: [id number]  Encounter: [id number]          ASSESSMENT AND PLAN:      1  GERD without esophagitis      - Case request operating room: ESOPHAGOGASTRODUODENOSCOPY (EGD)    He is on PPI therapy long standing  He is taking once a day currently  Will plan for EGD evaluation  He is also on chronic NSAID use for musculoskeletal related pain  Will further examined to see if he has any peptic ulcer disease due to chronic NSAID use  Will further discuss if he will benefit from long-term PPI use  EGD is planned to rule out Arvizu's esophagus and hiatal hernia  Discuss lifestyle and dietary modifications  2  Screening for colon cancer  He is currently not interested in colonoscopy evaluation  Hemoccult/FIT was negative which was checked recently  We discuss benefits of colonoscopy but would currently still like to hold off on this at this time  ______________________________________________________________________    HPI:     He is a 51-year-old male presents here for evaluation of longstanding GERD  Otherwise overall doing well  Denies any acute distress  He has been on longstanding PPI therapy once daily with good control of his reflux symptoms  He has not had an EGD evaluation in the past   Denies any nausea, vomiting, fevers, chills  Symptoms of reflux are poorly controlled if he stops PPI therapy  He is currently not interested in screening colonoscopy  Recent fit testing was within normal limits  REVIEW OF SYSTEMS:    CONSTITUTIONAL: Denies any fever, chills, rigors, and weight loss  HEENT: No earache or tinnitus  Denies hearing loss or visual disturbances  CARDIOVASCULAR: No chest pain or palpitations  RESPIRATORY: Denies any cough, hemoptysis, shortness of breath or dyspnea on exertion  GASTROINTESTINAL: As noted in the History of Present Illness  GENITOURINARY: No problems with urination  Denies any hematuria or dysuria  NEUROLOGIC: No dizziness or vertigo, denies headaches  MUSCULOSKELETAL: Denies any muscle or joint pain  SKIN: Denies skin rashes or itching  ENDOCRINE: Denies excessive thirst  Denies intolerance to heat or cold  PSYCHOSOCIAL: Denies depression or anxiety  Denies any recent memory loss         Historical Information   Past Medical History:   Diagnosis Date    Anxiety     Arthritis     Asthma     Dermatitis of foot     Last Assessed 1/18/2016    Edentulous     Fracture of fibula     Distal; Last Assessed 7/11/2014    GERD (gastroesophageal reflux disease)     GERD (gastroesophageal reflux disease)     History of fusion of cervical spine     Hyperlipidemia     Low back pain     states on prednisone for a  few days for back    Moderate persistent asthma     Last Assessed 7/3/2014    Neurosis, anxiety, panic type     Resolved 8/20/2015    Prepatellar bursitis, right knee     Smoker     Tinnitus     Tobacco abuse     Last Assessed 1/18/2016    Wears glasses     reading     Past Surgical History:   Procedure Laterality Date    CERVICAL FUSION      NECK SURGERY      Last Assessed 5/02/2017    [place] REMOVAL PREPATELLA BURSA Right 3/8/2018    Procedure: EXCISION BURSA PREPATELLAR;  Surgeon: Sue Garcia MD;  [facility];  Service: Orthopedics    [place] SHLDR ARTHROSCOP,SURG,W/ROTAT CUFF REPR Right 7/24/2017    Procedure: ARTHROSCOPY SHOULDER, ROTATOR CUFF REPAIR,OPEN BICEP TENODESIS;  Surgeon: Sue Garcia MD;  [facility];  Service: Orthopedics    TOOTH EXTRACTION       Social History   History   Alcohol Use    Yes     Comment: few x month; Per allscript recovering alcoholic last drink May 0312;  Denied  Social  alcohol use     History   Drug Use    Types: Marijuana     Comment: smokes daily, last smoked 3/7/18 ;Per allscript No drug  use     History   Smoking Status    Current Every Day Smoker    Packs/day: 0 50    Years: 10 00    Types: Cigarettes   Smokeless Tobacco    Never Used     Family History   Problem Relation Age of Onset    No Known Problems Mother     No Known Problems Father     Ovarian cancer Other         Per Allscript    Diabetes type II Family         Mellitus wothout complication, without long-term current use       Meds/Allergies       Current Outpatient Prescriptions:     albuterol (VENTOLIN HFA) 90 mcg/act inhaler    ALPRAZolam (XANAX) 0 25 mg tablet    atorvastatin (LIPITOR) 20 mg tablet    budesonide-formoterol (SYMBICORT) 160-4 5 mcg/act inhaler    DULoxetine (CYMBALTA) 60 mg delayed release capsule    ergocalciferol (VITAMIN D2) 50,000 units    fluticasone-salmeterol (ADVAIR) 100-50 mcg/dose inhaler    lamoTRIgine (LaMICtal) 100 mg tablet    lidocaine (LMX) 4 % cream    meloxicam (MOBIC) 7 5 mg tablet    mometasone-formoterol (DULERA) 100-5 MCG/ACT inhaler    naproxen (NAPROSYN) 500 mg tablet    omeprazole (PriLOSEC) 40 MG capsule    promethazine (PHENERGAN) 12 5 MG tablet    QUEtiapine (SEROquel) 200 mg tablet    No Known Allergies        Objective     Blood pressure 130/70, pulse (!) 113, temperature 97 8 °F (36 6 °C), temperature source Tympanic, height 5' 4" (1 626 m), weight 66 2 kg (146 lb)  Body mass index is 25 06 kg/m²  PHYSICAL EXAM:      General Appearance:   Alert, cooperative, no distress   HEENT:   Normocephalic, atraumatic, anicteric      Neck:  Supple, symmetrical, trachea midline   Lungs:   Clear to auscultation bilaterally; no rales, rhonchi or wheezing; respirations unlabored    Heart[de-identified]   Regular rate and rhythm; no murmur, rub, or gallop     Abdomen:   Soft, non-tender, non-distended; normal bowel sounds; no masses, no organomegaly    Genitalia:   Deferred    Rectal:   Deferred    Extremities:  No cyanosis, clubbing or edema    Pulses:  2+ and symmetric    Skin:  No jaundice, rashes, or lesions    Lymph nodes:  No palpable cervical lymphadenopathy        Lab Results:   No visits with results within 1 Day(s) from this visit  Latest known visit with results is:   Appointment on 08/02/2018   Component Date Value    OCCULT BLD, FECAL IMMUNO* 08/02/2018 Negative          Radiology Results:   No results found

## 2018-08-29 NOTE — LETTER
August 29, 2018     Nicolas Sierra PA-C  [address] [place] [address]    Patient: Isaura Zamora   YOB: 1965   Date of Visit: 8/29/2018       Dear Dr Bakari Dash:    Thank you for referring Alvino Ken to me for evaluation  Below are my notes for this consultation  If you have questions, please do not hesitate to call me  I look forward to following your patient along with you  Sincerely,        Opal Cooks, MD        CC: Claudina Pippin, MD Opal Cooks, MD  8/29/2018 10:56 AM  Sign at close encounter  [facility] - Outpatient Consultation  Isaura Zamora 48 y o  male MRN: [id number]  Encounter: [id number]          ASSESSMENT AND PLAN:      1  GERD without esophagitis      - Case request operating room: ESOPHAGOGASTRODUODENOSCOPY (EGD)    He is on PPI therapy long standing  He is taking once a day currently  Will plan for EGD evaluation  He is also on chronic NSAID use for musculoskeletal related pain  Will further examined to see if he has any peptic ulcer disease due to chronic NSAID use  Will further discuss if he will benefit from long-term PPI use  EGD is planned to rule out Arvizu's esophagus and hiatal hernia  Discuss lifestyle and dietary modifications  2  Screening for colon cancer  He is currently not interested in colonoscopy evaluation  Hemoccult/FIT was negative which was checked recently  We discuss benefits of colonoscopy but would currently still like to hold off on this at this time  ______________________________________________________________________    HPI:     He is a 53-year-old male presents here for evaluation of longstanding GERD  Otherwise overall doing well  Denies any acute distress  He has been on longstanding PPI therapy once daily with good control of his reflux symptoms  He has not had an EGD evaluation in the past   Denies any nausea, vomiting, fevers, chills   Symptoms of reflux are poorly controlled if he stops PPI therapy  He is currently not interested in screening colonoscopy  Recent fit testing was within normal limits  REVIEW OF SYSTEMS:    CONSTITUTIONAL: Denies any fever, chills, rigors, and weight loss  HEENT: No earache or tinnitus  Denies hearing loss or visual disturbances  CARDIOVASCULAR: No chest pain or palpitations  RESPIRATORY: Denies any cough, hemoptysis, shortness of breath or dyspnea on exertion  GASTROINTESTINAL: As noted in the History of Present Illness  GENITOURINARY: No problems with urination  Denies any hematuria or dysuria  NEUROLOGIC: No dizziness or vertigo, denies headaches  MUSCULOSKELETAL: Denies any muscle or joint pain  SKIN: Denies skin rashes or itching  ENDOCRINE: Denies excessive thirst  Denies intolerance to heat or cold  PSYCHOSOCIAL: Denies depression or anxiety  Denies any recent memory loss         Historical Information   Past Medical History:   Diagnosis Date    Anxiety     Arthritis     Asthma     Dermatitis of foot     Last Assessed 1/18/2016    Edentulous     Fracture of fibula     Distal; Last Assessed 7/11/2014    GERD (gastroesophageal reflux disease)     GERD (gastroesophageal reflux disease)     History of fusion of cervical spine     Hyperlipidemia     Low back pain     states on prednisone for a  few days for back    Moderate persistent asthma     Last Assessed 7/3/2014    Neurosis, anxiety, panic type     Resolved 8/20/2015    Prepatellar bursitis, right knee     Smoker     Tinnitus     Tobacco abuse     Last Assessed 1/18/2016    Wears glasses     reading     Past Surgical History:   Procedure Laterality Date    CERVICAL FUSION      NECK SURGERY      Last Assessed 5/02/2017    [place] REMOVAL PREPATELLA BURSA Right 3/8/2018    Procedure: EXCISION BURSA PREPATELLAR;  Surgeon: Anson Holter, MD;  [facility];  Service: Orthopedics    [place] SHLDR ARTHROSCOP,SURG,W/ROTAT CUFF REPR Right 7/24/2017 Procedure: ARTHROSCOPY SHOULDER, ROTATOR CUFF REPAIR,OPEN BICEP TENODESIS;  Surgeon: Laree Brunner, MD;  [facility];  Service: Orthopedics    TOOTH EXTRACTION       Social History   History   Alcohol Use    Yes     Comment: few x month; Per allscript recovering alcoholic last drink May 2391;  Denied  Social  alcohol use     History   Drug Use    Types: Marijuana     Comment: smokes daily, last smoked 3/7/18 ;Per allscript No drug  use     History   Smoking Status    Current Every Day Smoker    Packs/day: 0 50    Years: 10 00    Types: Cigarettes   Smokeless Tobacco    Never Used     Family History   Problem Relation Age of Onset    No Known Problems Mother     No Known Problems Father     Ovarian cancer Other         Per Allscript    Diabetes type II Family         Mellitus wothout complication, without long-term current use       Meds/Allergies       Current Outpatient Prescriptions:     albuterol (VENTOLIN HFA) 90 mcg/act inhaler    ALPRAZolam (XANAX) 0 25 mg tablet    atorvastatin (LIPITOR) 20 mg tablet    budesonide-formoterol (SYMBICORT) 160-4 5 mcg/act inhaler    DULoxetine (CYMBALTA) 60 mg delayed release capsule    ergocalciferol (VITAMIN D2) 50,000 units    fluticasone-salmeterol (ADVAIR) 100-50 mcg/dose inhaler    lamoTRIgine (LaMICtal) 100 mg tablet    lidocaine (LMX) 4 % cream    meloxicam (MOBIC) 7 5 mg tablet    mometasone-formoterol (DULERA) 100-5 MCG/ACT inhaler    naproxen (NAPROSYN) 500 mg tablet    omeprazole (PriLOSEC) 40 MG capsule    promethazine (PHENERGAN) 12 5 MG tablet    QUEtiapine (SEROquel) 200 mg tablet    No Known Allergies        Objective     Blood pressure 130/70, pulse (!) 113, temperature 97 8 °F (36 6 °C), temperature source Tympanic, height 5' 4" (1 626 m), weight 66 2 kg (146 lb)  Body mass index is 25 06 kg/m²  PHYSICAL EXAM:      General Appearance:   Alert, cooperative, no distress   HEENT:   Normocephalic, atraumatic, anicteric      Neck:  Supple, symmetrical, trachea midline   Lungs:   Clear to auscultation bilaterally; no rales, rhonchi or wheezing; respirations unlabored    Heart[de-identified]   Regular rate and rhythm; no murmur, rub, or gallop  Abdomen:   Soft, non-tender, non-distended; normal bowel sounds; no masses, no organomegaly    Genitalia:   Deferred    Rectal:   Deferred    Extremities:  No cyanosis, clubbing or edema    Pulses:  2+ and symmetric    Skin:  No jaundice, rashes, or lesions    Lymph nodes:  No palpable cervical lymphadenopathy        Lab Results:   No visits with results within 1 Day(s) from this visit  Latest known visit with results is:   Appointment on 08/02/2018   Component Date Value    OCCULT BLD, FECAL IMMUNO* 08/02/2018 Negative          Radiology Results:   No results found

## 2018-08-30 NOTE — TELEPHONE ENCOUNTER
Called and spoke to the pt and he already got the lidoderm 4% cream  I already told him about the OTC lidoderm 4% patch but forgot to document it   DG

## 2018-09-02 DIAGNOSIS — M70.41 PREPATELLAR BURSITIS OF RIGHT KNEE: ICD-10-CM

## 2018-09-04 RX ORDER — ATORVASTATIN CALCIUM 20 MG/1
TABLET, FILM COATED ORAL
Qty: 30 TABLET | Refills: 1 | OUTPATIENT
Start: 2018-09-04

## 2018-09-05 RX ORDER — MELOXICAM 7.5 MG/1
TABLET ORAL
Qty: 60 TABLET | Refills: 2 | OUTPATIENT
Start: 2018-09-05

## 2018-09-07 DIAGNOSIS — K21.9 GERD WITHOUT ESOPHAGITIS: ICD-10-CM

## 2018-09-07 DIAGNOSIS — K21.9 GASTROESOPHAGEAL REFLUX DISEASE WITHOUT ESOPHAGITIS: ICD-10-CM

## 2018-09-07 RX ORDER — OMEPRAZOLE 40 MG/1
40 CAPSULE, DELAYED RELEASE ORAL DAILY
Qty: 30 CAPSULE | Refills: 5 | Status: SHIPPED | OUTPATIENT
Start: 2018-09-07 | End: 2018-09-21 | Stop reason: SDUPTHER

## 2018-09-14 ENCOUNTER — TELEPHONE (OUTPATIENT)
Dept: INTERNAL MEDICINE CLINIC | Facility: CLINIC | Age: 53
End: 2018-09-14

## 2018-09-14 DIAGNOSIS — E78.5 HYPERLIPIDEMIA, UNSPECIFIED HYPERLIPIDEMIA TYPE: Primary | ICD-10-CM

## 2018-09-14 DIAGNOSIS — M70.41 PREPATELLAR BURSITIS OF RIGHT KNEE: ICD-10-CM

## 2018-09-14 NOTE — TELEPHONE ENCOUNTER
Patient called for a refill on Lipitor-out and his appt it not until 9/21/18 with Salomon Parr and will be out before his appointment    Please send to [facility] in [facility]

## 2018-09-15 DIAGNOSIS — M70.41 PREPATELLAR BURSITIS OF RIGHT KNEE: ICD-10-CM

## 2018-09-15 RX ORDER — ATORVASTATIN CALCIUM 20 MG/1
20 TABLET, FILM COATED ORAL DAILY
Qty: 30 TABLET | Refills: 0 | Status: SHIPPED | OUTPATIENT
Start: 2018-09-15 | End: 2018-09-21 | Stop reason: SDUPTHER

## 2018-09-17 RX ORDER — MELOXICAM 7.5 MG/1
TABLET ORAL
Qty: 60 TABLET | Refills: 2 | OUTPATIENT
Start: 2018-09-17

## 2018-09-21 ENCOUNTER — OFFICE VISIT (OUTPATIENT)
Dept: INTERNAL MEDICINE CLINIC | Facility: CLINIC | Age: 53
End: 2018-09-21
Payer: COMMERCIAL

## 2018-09-21 VITALS
DIASTOLIC BLOOD PRESSURE: 88 MMHG | HEART RATE: 66 BPM | SYSTOLIC BLOOD PRESSURE: 134 MMHG | TEMPERATURE: 98.1 F | WEIGHT: 146.6 LBS | BODY MASS INDEX: 25.98 KG/M2 | HEIGHT: 63 IN | OXYGEN SATURATION: 97 %

## 2018-09-21 DIAGNOSIS — E78.1 HYPERTRIGLYCERIDEMIA: ICD-10-CM

## 2018-09-21 DIAGNOSIS — M54.2 NECK PAIN: ICD-10-CM

## 2018-09-21 DIAGNOSIS — R94.6 ABNORMAL THYROID FUNCTION TEST: ICD-10-CM

## 2018-09-21 DIAGNOSIS — R00.2 PALPITATION: ICD-10-CM

## 2018-09-21 DIAGNOSIS — J45.31 MILD PERSISTENT ASTHMA WITH ACUTE EXACERBATION: ICD-10-CM

## 2018-09-21 DIAGNOSIS — F31.60 BIPOLAR DISORDER, MIXED (HCC): ICD-10-CM

## 2018-09-21 DIAGNOSIS — Z12.11 SCREENING FOR COLON CANCER: Primary | ICD-10-CM

## 2018-09-21 DIAGNOSIS — M54.50 LOW BACK PAIN WITHOUT SCIATICA, UNSPECIFIED BACK PAIN LATERALITY, UNSPECIFIED CHRONICITY: ICD-10-CM

## 2018-09-21 DIAGNOSIS — E74.39 GLUCOSE INTOLERANCE: ICD-10-CM

## 2018-09-21 DIAGNOSIS — K21.9 GERD WITHOUT ESOPHAGITIS: ICD-10-CM

## 2018-09-21 DIAGNOSIS — E78.5 HYPERLIPIDEMIA, UNSPECIFIED HYPERLIPIDEMIA TYPE: ICD-10-CM

## 2018-09-21 DIAGNOSIS — K21.9 GASTROESOPHAGEAL REFLUX DISEASE WITHOUT ESOPHAGITIS: ICD-10-CM

## 2018-09-21 DIAGNOSIS — Z72.0 TOBACCO ABUSE: ICD-10-CM

## 2018-09-21 PROCEDURE — 93000 ELECTROCARDIOGRAM COMPLETE: CPT | Performed by: PHYSICIAN ASSISTANT

## 2018-09-21 PROCEDURE — 99214 OFFICE O/P EST MOD 30 MIN: CPT | Performed by: PHYSICIAN ASSISTANT

## 2018-09-21 RX ORDER — OMEPRAZOLE 40 MG/1
40 CAPSULE, DELAYED RELEASE ORAL DAILY
Qty: 90 CAPSULE | Refills: 1 | Status: SHIPPED | OUTPATIENT
Start: 2018-09-21 | End: 2018-11-07 | Stop reason: SDUPTHER

## 2018-09-21 RX ORDER — ATORVASTATIN CALCIUM 20 MG/1
20 TABLET, FILM COATED ORAL DAILY
Qty: 90 TABLET | Refills: 1 | Status: SHIPPED | OUTPATIENT
Start: 2018-09-21 | End: 2018-11-07 | Stop reason: SDUPTHER

## 2018-09-21 NOTE — PATIENT INSTRUCTIONS
Low back pain  Advised him to go for pain management for low back as well as cervical spine  Previously had cervical imaging showing stable surgical site with cervical narrowing  Lumbar spine showing mild degenerative changes  Completed physical therapy at [facility] for his shoulder but unclear if he had PT for his neck and low back  He notes insurance is not covering MRI of his neck or low back  Neck pain  Previous neck surgery  No radicular sx at this time  Referral today to pain management  Advised him to schedule with pain management  Palpitation  EKG done in the office  Balanced diet, do not skip meals, Reduce caffeine  Work to stop smoking  Discussed red flag sx and ER precautions which need immediate eval and treat

## 2018-09-21 NOTE — ASSESSMENT & PLAN NOTE
Patient with persistently  Decreased T3-T4 with normal TSH  Previously referred to Endocrinology for further evaluation and treatment  Patient has not yet scheduled this appointment but is willing to  Referral given today  He will call and schedule    He is directed to report back if any difficulty scheduling

## 2018-09-21 NOTE — ASSESSMENT & PLAN NOTE
Previous neck surgery  No radicular sx at this time  Referral today to pain management  Advised him to schedule with pain management  patient currently without any radicular pain from cervical or lumbar region    Refer to physical therapy

## 2018-09-21 NOTE — ASSESSMENT & PLAN NOTE
EKG done in the office, unremarkable  No changes when compared to 3/5/2018 EKG  Discussed importance of balance diet  Increased hydration  Do not skip meals  Reduce caffeine  Work to reduce smoking with a goal of smoking cessation  Discussed red flag sx and ER precautions which need immediate eval and treat    Discussed EKG and case with Dr Frankie Hope

## 2018-09-21 NOTE — ASSESSMENT & PLAN NOTE
Symptoms currently controlled on Advair 1 puff inhaled twice daily  Previously had been on [facility] which he notes his insurance is no longer covering  Jaclondon Chilelter today and continue Advair  Gargle after use  Discussed at length the importance of smoking cessation    Discussed the risks of prolonged tobacco abuse

## 2018-09-21 NOTE — ASSESSMENT & PLAN NOTE
Lipids done over the summer however patient had not been taking Lipitor prior to his blood test   He was call with the results of his blood test and he has since restarted taking Lipitor and has been taking it daily  Discussed importance of this medication as well as diet and lifestyle modifications to help with cholesterol levels    Will repeat lipid panel to see effects of statin

## 2018-09-21 NOTE — ASSESSMENT & PLAN NOTE
Advised him to go for pain management for low back as well as cervical spine  Previously had cervical imaging showing stable surgical site with cervical narrowing  Lumbar spine showing mild degenerative changes  Completed physical therapy at [facility] for his shoulder but unclear if he had PT for his neck and low back  He notes insurance is not covering MRI of his neck or low back  Patient will set up with pain management however I discussed importance of making them aware that he previously had been established with a provider who was prescribing him medical marijuana as this may pose a barrier to him setting up with pain management    Patient will call to schedule with pain management and report back if any difficulties

## 2018-09-21 NOTE — PROGRESS NOTES
[facility]  Standard Office Visit  Patient ID: Gopal Pizarro    : 1965  Age/Gender: 48 y o  male     DATE: 2018      Assessment/Plan:    Low back pain  Advised him to go for pain management for low back as well as cervical spine  Previously had cervical imaging showing stable surgical site with cervical narrowing  Lumbar spine showing mild degenerative changes  Completed physical therapy at [facility] for his shoulder but unclear if he had PT for his neck and low back  He notes insurance is not covering MRI of his neck or low back  Patient will set up with pain management however I discussed importance of making them aware that he previously had been established with a provider who was prescribing him medical marijuana as this may pose a barrier to him setting up with pain management  Patient will call to schedule with pain management and report back if any difficulties    Neck pain  Previous neck surgery  No radicular sx at this time  Referral today to pain management  Advised him to schedule with pain management  patient currently without any radicular pain from cervical or lumbar region  Refer to physical therapy    Palpitation  EKG done in the office, unremarkable  No changes when compared to 3/5/2018 EKG  Discussed importance of balance diet  Increased hydration  Do not skip meals  Reduce caffeine  Work to reduce smoking with a goal of smoking cessation  Discussed red flag sx and ER precautions which need immediate eval and treat  Discussed EKG and case with Dr Anatoliy Abad    GERD without esophagitis   Continue on Prilosec  Discussed risks of prolonged use of nonsteroidal anti-inflammatories  Continue with Gastroenterology as scheduled    Asthma   Symptoms currently controlled on Advair 1 puff inhaled twice daily  Previously had been on [facility] which he notes his insurance is no longer covering    Discontinue Minor today and continue Advair  Gargle after use  Discussed at length the importance of smoking cessation  Discussed the risks of prolonged tobacco abuse    Abnormal thyroid function test   Patient with persistently  Decreased T3-T4 with normal TSH  Previously referred to Endocrinology for further evaluation and treatment  Patient has not yet scheduled this appointment but is willing to  Referral given today  He will call and schedule  He is directed to report back if any difficulty scheduling    Bipolar disorder, mixed ([address] )   Stable at this time following with Psychiatry    Hypertriglyceridemia   Lipids done over the summer however patient had not been taking Lipitor prior to his blood test   He was call with the results of his blood test and he has since restarted taking Lipitor and has been taking it daily  Discussed importance of this medication as well as diet and lifestyle modifications to help with cholesterol levels  Will repeat lipid panel to see effects of statin    Screening for colon cancer   Patient scheduled for endoscopy and following with Gastroenterology    Tobacco abuse   Patient previously was sent for CT of the chest earlier this year to further evaluate for any lung nodules due to extensive tobacco pack year history  Insurance would not cover CT of chest however would cover for x-ray  X-ray of chest was reviewed and was relatively unremarkable  Will continue to follow and advised smoking cessation       Diagnoses and all orders for this visit:    Screening for colon cancer  -     Ambulatory referral to Gastroenterology; Future    Gastroesophageal reflux disease without esophagitis  -     omeprazole (PriLOSEC) 40 MG capsule; Take 1 capsule (40 mg total) by mouth daily    GERD without esophagitis  -     omeprazole (PriLOSEC) 40 MG capsule; Take 1 capsule (40 mg total) by mouth daily    Hyperlipidemia, unspecified hyperlipidemia type  -     atorvastatin (LIPITOR) 20 mg tablet;  Take 1 tablet (20 mg total) by mouth daily    Mild persistent asthma with acute exacerbation    Tobacco abuse    Abnormal thyroid function test  -     Ambulatory referral to Endocrinology; Future    Bipolar disorder, mixed ([address] )    Hypertriglyceridemia  -     Lipid panel; Future    Neck pain  -     Ambulatory referral to Pain Management; Future  -     Ambulatory referral to Physical Therapy; Future    Palpitation  -     CBC and differential; Future  -     Comprehensive metabolic panel; Future  -     Magnesium; Future  -     TSH, 3rd generation; Future  -     Holter monitor - 24 hour; Future    Low back pain without sciatica, unspecified back pain laterality, unspecified chronicity  -     Ambulatory referral to Pain Management; Future  -     Ambulatory referral to Physical Therapy; Future    Glucose intolerance  -     HEMOGLOBIN A1C W/ EAG ESTIMATION; Future    Other orders  -     Cancel: Comprehensive metabolic panel; Future  -     Cancel: T3; Future  -     Cancel: T4, free; Future  -     Cancel: Holter monitor - 24 hour; Future          Subjective:   Chief Complaint   Patient presents with    Follow-up     Pt is here for his 3 month follow up for his GERD and asthma  Review labs from 7/10/18   GERD    Asthma         Kiki Lozada is a 48 y o  male who presents to the office on 9/21/2018 for     For eval of back pain as well as neck pain  He notes that mobic didn't help his sx  Naprosyn did little to help his pain  Using medical marijuana to help with appetite  Using medical marijuana for pain  Notes he has a script for this  Follows with psychiatrist   Notes he has been with therapist Jackson Green for some time  Mood have been controlled for some time  He notes that at times he does get anxious and has experience palpitations during this  He notes that palpitations will only come on with stress at rest at night  Do not come on during the day with activity  No chest pain or palpitations with activity  He notes he has not yet scheduled with Endocrinology  He has seen Gastroenterology who has evaluated him and who has scheduled him for colonoscopy which she will be getting done soon  Taking omeprazole for GI symptoms  He notes he does continue to smoke and is not interested in smoking cessation at this time  patient notes he is safe at home  Has a stable housing situation  Has plenty of food  He notes he currently is getting medical marijuana  From a provider in [facility]     history of right shoulder surgery earlier this year  History of neck surgery in the past   Notes that he has chronic neck and low back pain  Denies arm pain  Denies lower extremity pain  No radiation of pain from his neck into his arms  No radiation of pain from his low back and his legs  No change in bowel or bladder function  patient notes he had as cervical x-ray as well as a lumbar x-ray earlier this year  He was evaluated and sent for MRIs however he was told his insurance would not cover his MRI  Asthma   He complains of cough and wheezing (  Wheezing comes and goes  Has a question on how he is to take his inhalers  )  There is no chest tightness, difficulty breathing or shortness of breath  The problem has been waxing and waning  The cough is non-productive  Pertinent negatives include no chest pain, dyspnea on exertion or trouble swallowing  His past medical history is significant for asthma  Palpitations   This is a chronic problem  The current episode started more than 1 year ago  The problem occurs intermittently  The problem has been resolved  Associated symptoms include coughing and neck pain ( chronic unchanged)  Pertinent negatives include no abdominal pain, anorexia ( appetite fluctuates), chest pain, nausea, numbness, vomiting or weakness  Neck Pain    This is a chronic problem  The current episode started more than 1 year ago  The problem occurs constantly   The problem has been waxing and waning  Associated with: Had previous neck surgery  The quality of the pain is described as aching and shooting  Exacerbated by: Activity  Pertinent negatives include no chest pain, leg pain, numbness, pain with swallowing, syncope, tingling, trouble swallowing or weakness  Back Pain   This is a chronic problem  The current episode started more than 1 year ago  The problem occurs intermittently  The problem has been waxing and waning since onset  The pain is present in the lumbar spine  The quality of the pain is described as aching, cramping, shooting and stabbing  The pain is moderate  The pain is worse during the day  The symptoms are aggravated by bending, standing and sitting  Pertinent negatives include no abdominal pain, bladder incontinence, bowel incontinence, chest pain, dysuria, leg pain, numbness, paresthesias, perianal numbness, tingling or weakness  The following portions of the patient's history were reviewed and updated as appropriate: allergies, current medications, past family history, past medical history, past social history, past surgical history and problem list     Review of Systems   Constitutional: Negative for unexpected weight change  HENT: Negative for trouble swallowing  Respiratory: Positive for cough and wheezing (  Wheezing comes and goes  Has a question on how he is to take his inhalers  )  Negative for shortness of breath  Cardiovascular: Negative for chest pain, dyspnea on exertion and syncope  Gastrointestinal: Negative for abdominal pain, anorexia ( appetite fluctuates), bowel incontinence, nausea and vomiting  Chronic GERD  Taking omeprazole  Has follow-up testing scheduled by Gastroenterology   Genitourinary: Negative for bladder incontinence and dysuria  Musculoskeletal: Positive for back pain and neck pain ( chronic unchanged)  Neurological: Negative for tingling, weakness, numbness and paresthesias     Psychiatric/Behavioral:        Stable at this time following with Psychiatry         Patient Active Problem List   Diagnosis    Prepatellar bursitis of right knee    Tobacco abuse    GERD without esophagitis    Incomplete tear of right rotator cuff    Low back pain    Neck pain    Nicotine dependence    S/P cervical spinal fusion    Vitamin D deficiency    Hypertriglyceridemia    HNP (herniated nucleus pulposus) with myelopathy, cervical    Dyslipidemia    Cervical radiculopathy    Bipolar disorder, mixed ([facility] [address]    Asthma    Screening for colon cancer    Abnormal thyroid function test    Chronic right-sided low back pain without sciatica    Palpitation       Past Medical History:   Diagnosis Date    Anxiety     Arthritis     Asthma     Dermatitis of foot     Last Assessed 1/18/2016    Edentulous     Fracture of fibula     Distal; Last Assessed 7/11/2014    GERD (gastroesophageal reflux disease)     GERD (gastroesophageal reflux disease)     History of fusion of cervical spine     Hyperlipidemia     Low back pain     states on prednisone for a  few days for back    Moderate persistent asthma     Last Assessed 7/3/2014    Neurosis, anxiety, panic type     Resolved 8/20/2015    Prepatellar bursitis, right knee     Smoker     Tinnitus     Tobacco abuse     Last Assessed 1/18/2016    Wears glasses     reading       Past Surgical History:   Procedure Laterality Date    CERVICAL FUSION      NECK SURGERY      Last Assessed 5/02/2017    IA REMOVAL PREPATELLA BURSA Right 3/8/2018    Procedure: EXCISION BURSA PREPATELLAR;  Surgeon: Connie Headley MD;  Location[facility];  Service: Orthopedics    [place] [address] ARTHROSCOP,SURG,W/ROTAT CUFF REPR Right 7/24/2017    Procedure: ARTHROSCOPY SHOULDER, ROTATOR CUFF REPAIR,OPEN BICEP TENODESIS;  Surgeon: Connie Headley MD;  Location: [facility];  Service: Orthopedics    TOOTH EXTRACTION           Current Outpatient Prescriptions:     albuterol (VENTOLIN HFA) 90 mcg/act inhaler, Inhale 1-2 puffs every 4 (four) hours as needed for wheezing or shortness of breath, Disp: 1 Inhaler, Rfl: 3    ALPRAZolam (XANAX) 0 25 mg tablet, Take 0 25 mg by mouth 2 (two) times a day as needed  , Disp: , Rfl:     atorvastatin (LIPITOR) 20 mg tablet, Take 1 tablet (20 mg total) by mouth daily, Disp: 90 tablet, Rfl: 1    DULoxetine (CYMBALTA) 60 mg delayed release capsule, Take 60 mg by mouth every morning, Disp: , Rfl: 0    fluticasone-salmeterol (ADVAIR) 100-50 mcg/dose inhaler, Inhale 1 puff 2 (two) times a day Rinse mouth after use , Disp: 1 Inhaler, Rfl: 3    lamoTRIgine (LaMICtal) 100 mg tablet, Take 1 tablet (100 mg total) by mouth every morning, Disp: 5 tablet, Rfl: 0    lidocaine (LMX) 4 % cream, Apply topically daily as needed for mild pain (low back), Disp: 30 g, Rfl: 0    omeprazole (PriLOSEC) 40 MG capsule, Take 1 capsule (40 mg total) by mouth daily, Disp: 90 capsule, Rfl: 1    QUEtiapine (SEROquel) 200 mg tablet, Take 1 tablet (200 mg total) by mouth 2 (two) times a day, Disp: 10 tablet, Rfl: 0    ergocalciferol (VITAMIN D2) 50,000 units, Take 1 capsule by mouth once a week, Disp: , Rfl:     No Known Allergies    Social History     Social History    Marital status: Single     Spouse name: N/A    Number of children: N/A    Years of education: N/A     Social History Main Topics    Smoking status: Current Every Day Smoker     Packs/day: 0 50     Years: 10 00     Types: Cigarettes    Smokeless tobacco: Never Used      Comment: smoking for 20 years    Alcohol use Yes      Comment: socially    Drug use: Yes     Types: Marijuana      Comment: smokes daily, last smoked 3/7/18 ;Per allscript No drug  use    Sexual activity: Not Asked     Other Topics Concern    None     Social History Narrative    None       Family History   Problem Relation Age of Onset    No Known Problems Mother     No Known Problems Father     Ovarian cancer Other         Per Allscript    Diabetes type II Family Mellitus wothout complication, without long-term current use       PHQ-9 Depression Screening    PHQ-9:    Frequency of the following problems over the past two weeks:              Health Maintenance   Topic Date Due    CRC Screening: Colonoscopy  1965    Pneumococcal PPSV23 Medium Risk Adult (1 of 1 - PPSV23) 06/28/1986    DTaP,Tdap,and Td Vaccines (1 - Tdap) 09/28/2013    INFLUENZA VACCINE  09/01/2018    Depression Screening PHQ  02/28/2019    CRC Screening: FOBTx3/FIT  08/02/2019       Immunization History   Administered Date(s) Administered    Influenza Quadrivalent, 6-35 Months IM 10/20/2016    Influenza TIV (IM) 10/18/2015    Td (adult), adsorbed 09/27/2013        Objective:  Vitals:    09/21/18 1443   BP: 134/88   BP Location: Right arm   Patient Position: Sitting   Cuff Size: Adult   Pulse: 66   Temp: 98 1 °F (36 7 °C)   TempSrc: Oral   SpO2: 97%   Weight: 66 5 kg (146 lb 9 6 oz)   Height: 5' 3" (1 6 m)     Wt Readings from Last 3 Encounters:   09/21/18 66 5 kg (146 lb 9 6 oz)   08/29/18 66 2 kg (146 lb)   06/08/18 66 8 kg (147 lb 3 2 oz)     Body mass index is 25 97 kg/m²  No exam data present       Physical Exam   Constitutional: He is oriented to person, place, and time  He appears well-developed and well-nourished  No distress  Alert pleasant cooperative  Respectful  Seated in room in no acute distress  Normal response to simple questioning  Appropriate   HENT:   Head: Normocephalic and atraumatic  Mouth/Throat: Oropharynx is clear and moist  No oropharyngeal exudate  Eyes: Pupils are equal, round, and reactive to light  Right eye exhibits no discharge  Left eye exhibits no discharge  No scleral icterus  Neck: Neck supple  Cardiovascular: Normal rate, regular rhythm and normal heart sounds  No murmur heard  Regular rate and rhythm  No murmur   Pulmonary/Chest: Effort normal  No respiratory distress  He has wheezes (  Scattered intermittent late expiratory wheeze    No crackles  No rhonchi)  Abdominal: Soft  Bowel sounds are normal  There is no tenderness  Soft nontender   Musculoskeletal: He exhibits no edema  Right shoulder: He exhibits decreased range of motion ( decreased cervical range of motion) and tenderness  He exhibits no bony tenderness, no swelling and normal strength  Arms:  Neurological: He is alert and oriented to person, place, and time  Skin: Skin is warm and dry  No rash noted  He is not diaphoretic  Psychiatric: He has a normal mood and affect  His behavior is normal  Thought content normal  His mood appears not anxious  His speech is not rapid and/or pressured and not slurred  He is not agitated, not aggressive and not slowed  Cognition and memory are not impaired  He does not exhibit a depressed mood  He expresses no homicidal and no suicidal ideation  He is communicative  Denies suicidal or homicidal ideation   Nursing note and vitals reviewed            Future Appointments  Date Time Provider Lorena Mallory   10/19/2018 2:00 PM Radha Baeza PA-C [address] [place] [address]    Patient Care Team:  Radha Baeza PA-C as PCP - General (Internal Medicine)  DO Malik Lam MD

## 2018-09-21 NOTE — ASSESSMENT & PLAN NOTE
Patient previously was sent for CT of the chest earlier this year to further evaluate for any lung nodules due to extensive tobacco pack year history  Insurance would not cover CT of chest however would cover for x-ray    X-ray of chest was reviewed and was relatively unremarkable  Will continue to follow and advised smoking cessation

## 2018-09-21 NOTE — ASSESSMENT & PLAN NOTE
Continue on Prilosec  Discussed risks of prolonged use of nonsteroidal anti-inflammatories     Continue with Gastroenterology as scheduled

## 2018-09-25 DIAGNOSIS — M70.41 PREPATELLAR BURSITIS OF RIGHT KNEE: ICD-10-CM

## 2018-09-25 RX ORDER — NAPROXEN 500 MG/1
TABLET ORAL
Qty: 60 TABLET | Refills: 0 | Status: SHIPPED | OUTPATIENT
Start: 2018-09-25 | End: 2018-12-05 | Stop reason: SDUPTHER

## 2018-09-25 NOTE — TELEPHONE ENCOUNTER
Pt called he is looking for either naproxen or meloxicam for his pain  Please call him back  He said he discussed this with manny last week

## 2018-09-26 ENCOUNTER — TELEPHONE (OUTPATIENT)
Dept: INTERNAL MEDICINE CLINIC | Facility: CLINIC | Age: 53
End: 2018-09-26

## 2018-09-26 DIAGNOSIS — E55.9 VITAMIN D DEFICIENCY: Primary | ICD-10-CM

## 2018-09-26 DIAGNOSIS — M70.41 PREPATELLAR BURSITIS OF RIGHT KNEE: ICD-10-CM

## 2018-09-26 RX ORDER — ERGOCALCIFEROL 1.25 MG/1
50000 CAPSULE ORAL WEEKLY
Qty: 12 CAPSULE | Refills: 0 | Status: SHIPPED | OUTPATIENT
Start: 2018-09-26 | End: 2019-01-25 | Stop reason: SDUPTHER

## 2018-09-26 NOTE — TELEPHONE ENCOUNTER
Script sent to pharmacy  Please advise patient to take 2,000 IU of vitamin D after he finishes his 12 week course  Also please tell patient I will be added a vit D level to his blood-work  Please call patient

## 2018-09-26 NOTE — TELEPHONE ENCOUNTER
[facility] for patient notifying him that the medication has been sent to pharmacy

## 2018-09-26 NOTE — TELEPHONE ENCOUNTER
Patient calling because when he saw Gallo last week they discussed sending in Vitamin D2 to the pharmacy  It was not sent to the pharmacy and he would like to know if we can re-order that for him

## 2018-10-01 RX ORDER — MELOXICAM 7.5 MG/1
TABLET ORAL
Qty: 60 TABLET | Refills: 2 | OUTPATIENT
Start: 2018-10-01

## 2018-10-03 ENCOUNTER — PREP FOR PROCEDURE (OUTPATIENT)
Dept: GASTROENTEROLOGY | Facility: CLINIC | Age: 53
End: 2018-10-03

## 2018-10-03 ENCOUNTER — TELEPHONE (OUTPATIENT)
Dept: INTERNAL MEDICINE CLINIC | Facility: CLINIC | Age: 53
End: 2018-10-03

## 2018-10-03 DIAGNOSIS — K21.9 GERD WITHOUT ESOPHAGITIS: Primary | ICD-10-CM

## 2018-10-03 NOTE — TELEPHONE ENCOUNTER
Patient called and is looking to see why the [facility] that was discussed in his most recent office visit with Evaristo Michelle understands that he may need to wait until Friday      He is also looking to get a refill of ventolin

## 2018-10-30 ENCOUNTER — LAB (OUTPATIENT)
Dept: LAB | Facility: HOSPITAL | Age: 53
End: 2018-10-30
Payer: COMMERCIAL

## 2018-10-30 ENCOUNTER — ANESTHESIA EVENT (OUTPATIENT)
Dept: GASTROENTEROLOGY | Facility: MEDICAL CENTER | Age: 53
End: 2018-10-30

## 2018-10-30 DIAGNOSIS — R00.2 PALPITATION: ICD-10-CM

## 2018-10-30 DIAGNOSIS — E78.1 HYPERTRIGLYCERIDEMIA: ICD-10-CM

## 2018-10-30 DIAGNOSIS — E74.39 GLUCOSE INTOLERANCE: ICD-10-CM

## 2018-10-30 LAB
25(OH)D3 SERPL-MCNC: 33 NG/ML (ref 30–100)
ALBUMIN SERPL BCP-MCNC: 3.9 G/DL (ref 3.5–5)
ALP SERPL-CCNC: 113 U/L (ref 46–116)
ALT SERPL W P-5'-P-CCNC: 13 U/L (ref 12–78)
ANION GAP SERPL CALCULATED.3IONS-SCNC: 8 MMOL/L (ref 4–13)
AST SERPL W P-5'-P-CCNC: 9 U/L (ref 5–45)
BASOPHILS # BLD AUTO: 0.05 THOUSANDS/ΜL (ref 0–0.1)
BASOPHILS NFR BLD AUTO: 1 % (ref 0–1)
BILIRUB SERPL-MCNC: 0.2 MG/DL (ref 0.2–1)
BUN SERPL-MCNC: 10 MG/DL (ref 5–25)
CALCIUM SERPL-MCNC: 8.8 MG/DL (ref 8.3–10.1)
CHLORIDE SERPL-SCNC: 103 MMOL/L (ref 100–108)
CHOLEST SERPL-MCNC: 155 MG/DL (ref 50–200)
CO2 SERPL-SCNC: 27 MMOL/L (ref 21–32)
CREAT SERPL-MCNC: 0.96 MG/DL (ref 0.6–1.3)
EOSINOPHIL # BLD AUTO: 0.24 THOUSAND/ΜL (ref 0–0.61)
EOSINOPHIL NFR BLD AUTO: 3 % (ref 0–6)
ERYTHROCYTE [DISTWIDTH] IN BLOOD BY AUTOMATED COUNT: 14.1 % (ref 11.6–15.1)
EST. AVERAGE GLUCOSE BLD GHB EST-MCNC: 117 MG/DL
GFR SERPL CREATININE-BSD FRML MDRD: 91 ML/MIN/1.73SQ M
GLUCOSE P FAST SERPL-MCNC: 109 MG/DL (ref 65–99)
HBA1C MFR BLD: 5.7 % (ref 4.2–6.3)
HCT VFR BLD AUTO: 35.2 % (ref 36.5–49.3)
HDLC SERPL-MCNC: 57 MG/DL (ref 40–60)
HGB BLD-MCNC: 10.7 G/DL (ref 12–17)
IMM GRANULOCYTES # BLD AUTO: 0.07 THOUSAND/UL (ref 0–0.2)
IMM GRANULOCYTES NFR BLD AUTO: 1 % (ref 0–2)
LDLC SERPL CALC-MCNC: 73 MG/DL (ref 0–100)
LYMPHOCYTES # BLD AUTO: 1.85 THOUSANDS/ΜL (ref 0.6–4.47)
LYMPHOCYTES NFR BLD AUTO: 25 % (ref 14–44)
MAGNESIUM SERPL-MCNC: 1.9 MG/DL (ref 1.6–2.6)
MCH RBC QN AUTO: 25.7 PG (ref 26.8–34.3)
MCHC RBC AUTO-ENTMCNC: 30.4 G/DL (ref 31.4–37.4)
MCV RBC AUTO: 84 FL (ref 82–98)
MONOCYTES # BLD AUTO: 0.53 THOUSAND/ΜL (ref 0.17–1.22)
MONOCYTES NFR BLD AUTO: 7 % (ref 4–12)
NEUTROPHILS # BLD AUTO: 4.76 THOUSANDS/ΜL (ref 1.85–7.62)
NEUTS SEG NFR BLD AUTO: 63 % (ref 43–75)
NONHDLC SERPL-MCNC: 98 MG/DL
NRBC BLD AUTO-RTO: 0 /100 WBCS
PLATELET # BLD AUTO: 344 THOUSANDS/UL (ref 149–390)
PMV BLD AUTO: 9.9 FL (ref 8.9–12.7)
POTASSIUM SERPL-SCNC: 4.1 MMOL/L (ref 3.5–5.3)
PROT SERPL-MCNC: 7.1 G/DL (ref 6.4–8.2)
RBC # BLD AUTO: 4.17 MILLION/UL (ref 3.88–5.62)
SODIUM SERPL-SCNC: 138 MMOL/L (ref 136–145)
TRIGL SERPL-MCNC: 127 MG/DL
TSH SERPL DL<=0.05 MIU/L-ACNC: 1.53 UIU/ML (ref 0.36–3.74)
WBC # BLD AUTO: 7.5 THOUSAND/UL (ref 4.31–10.16)

## 2018-10-30 PROCEDURE — 80053 COMPREHEN METABOLIC PANEL: CPT

## 2018-10-30 PROCEDURE — 36415 COLL VENOUS BLD VENIPUNCTURE: CPT

## 2018-10-30 PROCEDURE — 83735 ASSAY OF MAGNESIUM: CPT

## 2018-10-30 PROCEDURE — 83036 HEMOGLOBIN GLYCOSYLATED A1C: CPT

## 2018-10-30 PROCEDURE — 82306 VITAMIN D 25 HYDROXY: CPT

## 2018-10-30 PROCEDURE — 84443 ASSAY THYROID STIM HORMONE: CPT

## 2018-10-30 PROCEDURE — 80061 LIPID PANEL: CPT

## 2018-10-30 PROCEDURE — 85025 COMPLETE CBC W/AUTO DIFF WBC: CPT

## 2018-10-31 ENCOUNTER — ANESTHESIA (OUTPATIENT)
Dept: GASTROENTEROLOGY | Facility: MEDICAL CENTER | Age: 53
End: 2018-10-31

## 2018-10-31 NOTE — TELEPHONE ENCOUNTER
Detail Level: Detailed Spoke to PT regarding his Lidocream  4%  Per allscripts, medication was D/C by Juan F Smyth on 01/05/2018  PT stated that his CT was denied by insurance, stated he does not have an order for any X-Ray  PT also stated he has not had any physical therapy done for his back  [facility]: Preventive Care And Screening: Influenza Immunization: Influenza Immunization previously received during influenza season

## 2018-11-02 ENCOUNTER — TELEPHONE (OUTPATIENT)
Dept: INTERNAL MEDICINE CLINIC | Facility: CLINIC | Age: 53
End: 2018-11-02

## 2018-11-02 DIAGNOSIS — D64.9 ANEMIA, UNSPECIFIED TYPE: Primary | ICD-10-CM

## 2018-11-02 NOTE — TELEPHONE ENCOUNTER
Could we please contact patient and bring her in for appointment early next week Monday or Tuesday with MD regarding decreasing hemoglobin  See note on lab    Thanks Molly Morse

## 2018-11-02 NOTE — PROGRESS NOTES
Called and reviewed with patient  No dizziness or lightheaded  No bloody stool, no tarry stool  No s/sx of bleed per patient  No recent blood donations  FIT test was preformed and was negative on 8/2018  He has not had colonoscopy or EGD

## 2018-11-06 ENCOUNTER — TELEPHONE (OUTPATIENT)
Dept: INTERNAL MEDICINE CLINIC | Facility: CLINIC | Age: 53
End: 2018-11-06

## 2018-11-06 NOTE — TELEPHONE ENCOUNTER
Called and spoke to Santo Maldonado about getting his repeat cbcd drawn for his 6:30pm appt with Dr Viv Maldonado said he will go to [facility] early tomorrow morning to have drawn so the results are back in time for his appt    Thank you

## 2018-11-07 ENCOUNTER — OFFICE VISIT (OUTPATIENT)
Dept: INTERNAL MEDICINE CLINIC | Facility: CLINIC | Age: 53
End: 2018-11-07
Payer: COMMERCIAL

## 2018-11-07 VITALS
OXYGEN SATURATION: 97 % | TEMPERATURE: 99 F | SYSTOLIC BLOOD PRESSURE: 120 MMHG | DIASTOLIC BLOOD PRESSURE: 84 MMHG | HEART RATE: 74 BPM | WEIGHT: 158.8 LBS | BODY MASS INDEX: 28.14 KG/M2 | HEIGHT: 63 IN

## 2018-11-07 DIAGNOSIS — J45.31 MILD PERSISTENT ASTHMA WITH ACUTE EXACERBATION: ICD-10-CM

## 2018-11-07 DIAGNOSIS — M54.50 LOW BACK PAIN WITHOUT SCIATICA, UNSPECIFIED BACK PAIN LATERALITY, UNSPECIFIED CHRONICITY: ICD-10-CM

## 2018-11-07 DIAGNOSIS — D64.9 ANEMIA, UNSPECIFIED TYPE: Primary | ICD-10-CM

## 2018-11-07 DIAGNOSIS — K21.9 GASTROESOPHAGEAL REFLUX DISEASE WITHOUT ESOPHAGITIS: ICD-10-CM

## 2018-11-07 DIAGNOSIS — M70.41 PREPATELLAR BURSITIS OF RIGHT KNEE: ICD-10-CM

## 2018-11-07 DIAGNOSIS — K21.9 GERD WITHOUT ESOPHAGITIS: ICD-10-CM

## 2018-11-07 DIAGNOSIS — E78.5 HYPERLIPIDEMIA, UNSPECIFIED HYPERLIPIDEMIA TYPE: ICD-10-CM

## 2018-11-07 PROCEDURE — 99213 OFFICE O/P EST LOW 20 MIN: CPT | Performed by: INTERNAL MEDICINE

## 2018-11-07 PROCEDURE — 3008F BODY MASS INDEX DOCD: CPT | Performed by: INTERNAL MEDICINE

## 2018-11-07 RX ORDER — LIDOCAINE 40 MG/G
CREAM TOPICAL DAILY PRN
Qty: 30 G | Refills: 1 | Status: SHIPPED | OUTPATIENT
Start: 2018-11-07 | End: 2019-08-30 | Stop reason: ALTCHOICE

## 2018-11-07 RX ORDER — ALBUTEROL SULFATE 90 UG/1
1-2 AEROSOL, METERED RESPIRATORY (INHALATION) EVERY 4 HOURS PRN
Qty: 1 INHALER | Refills: 5 | Status: SHIPPED | OUTPATIENT
Start: 2018-11-07 | End: 2019-01-25 | Stop reason: SDUPTHER

## 2018-11-07 RX ORDER — ATORVASTATIN CALCIUM 20 MG/1
20 TABLET, FILM COATED ORAL DAILY
Qty: 90 TABLET | Refills: 1 | Status: SHIPPED | OUTPATIENT
Start: 2018-11-07 | End: 2019-01-25 | Stop reason: SDUPTHER

## 2018-11-07 RX ORDER — OMEPRAZOLE 40 MG/1
40 CAPSULE, DELAYED RELEASE ORAL DAILY
Qty: 90 CAPSULE | Refills: 1 | Status: SHIPPED | OUTPATIENT
Start: 2018-11-07 | End: 2019-01-25 | Stop reason: SDUPTHER

## 2018-11-07 NOTE — PROGRESS NOTES
Assessment/Plan:    1  Anemia  Patient is a fecal occult blood test is negative  He does have repeat CBC on November 2, 2018  As per patient he went to [facility] but so far results are not available  We even call the hospital today and they claim that 2nd CBC was never drawn  So I will request and other CBC today and he will get it tomorrow  If the repeat CBC is still with low hemoglobin then he we will refer him to gastroenterologist for further workup including EGD and colonoscopy  If repeat CBC is within normal limits then he will not need any further workup  Subjective:          Patient ID: Libertad Andrea is a 48 y o  male  Anemia   Presents for initial visit  There has been no abdominal pain, anorexia, fever, leg swelling, light-headedness, pallor or palpitations  Past treatments include nothing  There is no history of alcohol abuse  There is no past history of colonoscopy or FOBT  The following portions of the patient's history were reviewed and updated as appropriate: allergies, past family history, past medical history, past social history, past surgical history and problem list     Review of Systems   Constitutional: Negative for fatigue and fever  HENT: Negative for congestion, ear discharge, ear pain, postnasal drip, sinus pressure, sore throat, tinnitus and trouble swallowing  Eyes: Negative for discharge, itching and visual disturbance  Respiratory: Negative for cough and shortness of breath  Cardiovascular: Negative for chest pain and palpitations  Gastrointestinal: Negative for abdominal pain, anorexia, diarrhea, nausea and vomiting  Endocrine: Negative for cold intolerance and polyuria  Genitourinary: Negative for difficulty urinating, dysuria and urgency  Musculoskeletal: Negative for arthralgias and neck pain  Skin: Negative for pallor and rash  Allergic/Immunologic: Negative for environmental allergies     Neurological: Negative for dizziness, weakness, light-headedness and headaches  Psychiatric/Behavioral: Negative for agitation and behavioral problems  The patient is not nervous/anxious            Past Medical History:   Diagnosis Date    Anemia     Anxiety     Arthritis     Asthma     Dermatitis of foot     Last Assessed 1/18/2016    Edentulous     Fracture of fibula     Distal; Last Assessed 7/11/2014    GERD (gastroesophageal reflux disease)     GERD (gastroesophageal reflux disease)     History of fusion of cervical spine     Hyperlipidemia     Low back pain     states on prednisone for a  few days for back    Moderate persistent asthma     Last Assessed 7/3/2014    Neurosis, anxiety, panic type     Resolved 8/20/2015    Prepatellar bursitis, right knee     Smoker     Tinnitus     Tobacco abuse     Last Assessed 1/18/2016    Wears glasses     reading         Current Outpatient Prescriptions:     albuterol (VENTOLIN HFA) 90 mcg/act inhaler, Inhale 1-2 puffs every 4 (four) hours as needed for wheezing or shortness of breath, Disp: 1 Inhaler, Rfl: 3    ALPRAZolam (XANAX) 0 25 mg tablet, Take 0 25 mg by mouth 2 (two) times a day as needed  , Disp: , Rfl:     atorvastatin (LIPITOR) 20 mg tablet, Take 1 tablet (20 mg total) by mouth daily, Disp: 90 tablet, Rfl: 1    DULoxetine (CYMBALTA) 60 mg delayed release capsule, Take 60 mg by mouth every morning, Disp: , Rfl: 0    ergocalciferol (VITAMIN D2) 50,000 units, Take 1 capsule (50,000 Units total) by mouth once a week, Disp: 12 capsule, Rfl: 0    fluticasone-salmeterol (ADVAIR) 100-50 mcg/dose inhaler, Inhale 1 puff 2 (two) times a day Rinse mouth after use , Disp: 1 Inhaler, Rfl: 3    lamoTRIgine (LaMICtal) 100 mg tablet, Take 1 tablet (100 mg total) by mouth every morning, Disp: 5 tablet, Rfl: 0    lidocaine (LMX) 4 % cream, Apply topically daily as needed for mild pain (low back), Disp: 30 g, Rfl: 0    naproxen (NAPROSYN) 500 mg tablet, take 1 tablet by mouth twice a day if needed for pain, Disp: 60 tablet, Rfl: 0    omeprazole (PriLOSEC) 40 MG capsule, Take 1 capsule (40 mg total) by mouth daily, Disp: 90 capsule, Rfl: 1    QUEtiapine (SEROquel) 200 mg tablet, Take 1 tablet (200 mg total) by mouth 2 (two) times a day, Disp: 10 tablet, Rfl: 0    No Known Allergies    Social History   Past Surgical History:   Procedure Laterality Date    CERVICAL FUSION      NECK SURGERY      Last Assessed 5/02/2017    NE REMOVAL PREPATELLA BURSA Right 3/8/2018    Procedure: EXCISION BURSA PREPATELLAR;  Surgeon: Toshia Davey MD;  Location: [facility];  Service: Orthopedics    NE SHLDR ARTHROSCOP,SURG,W/ROTAT CUFF REPR Right 7/24/2017    Procedure: ARTHROSCOPY SHOULDER, ROTATOR CUFF REPAIR,OPEN BICEP TENODESIS;  Surgeon: Toshia Davey MD;  Location: [facility];  Service: Orthopedics    TOOTH EXTRACTION       Family History   Problem Relation Age of Onset    No Known Problems Mother     No Known Problems Father     Ovarian cancer Other         Per Allscript    Diabetes type II Family         Mellitus wothout complication, without long-term current use       Objective:  /84 (BP Location: Left arm, Patient Position: Sitting, Cuff Size: Adult)   Pulse 74   Temp 99 °F (37 2 °C) (Oral)   Ht 5' 3" (1 6 m)   Wt 72 kg (158 lb 12 8 oz)   SpO2 97%   BMI 28 13 kg/m²   Body mass index is 28 13 kg/m²  Physical Exam   Constitutional: He appears well-developed  HENT:   Head: Normocephalic  Right Ear: External ear normal    Left Ear: External ear normal    Mouth/Throat: Oropharynx is clear and moist    Eyes: Pupils are equal, round, and reactive to light  Conjunctivae are normal  No scleral icterus  Neck: Normal range of motion  Neck supple  No tracheal deviation present  No thyromegaly present  Cardiovascular: Normal rate, regular rhythm and normal heart sounds  No murmur heard  Pulmonary/Chest: Effort normal  No respiratory distress  He has wheezes   He exhibits no tenderness  Abdominal: Soft  Bowel sounds are normal  He exhibits no mass  There is no tenderness  Musculoskeletal: Normal range of motion  He exhibits no edema  Lymphadenopathy:     He has no cervical adenopathy  Neurological: He is alert  No cranial nerve deficit  Skin: Skin is warm  Psychiatric: He has a normal mood and affect

## 2018-11-09 NOTE — PROGRESS NOTES
Discussed case with Dr Vannesa Manuel  He saw this patient this week who went for labs however labs were not available for review  Could you please contact patient and see if he went for repeat labs and if so get the results ASAP for evaluation    Thanks Alexa Baptiste

## 2018-11-27 LAB
BASOPHILS # BLD AUTO: 39 CELLS/UL (ref 0–200)
BASOPHILS NFR BLD AUTO: 0.7 %
EOSINOPHIL # BLD AUTO: 110 CELLS/UL (ref 15–500)
EOSINOPHIL NFR BLD AUTO: 2 %
ERYTHROCYTE [DISTWIDTH] IN BLOOD BY AUTOMATED COUNT: 15.1 % (ref 11–15)
FERRITIN SERPL-MCNC: 18 NG/ML (ref 20–380)
HCT VFR BLD AUTO: 37.3 % (ref 38.5–50)
HGB BLD-MCNC: 12.1 G/DL (ref 13.2–17.1)
IRON SATN MFR SERPL: 5 % (CALC) (ref 15–60)
IRON SERPL-MCNC: 26 MCG/DL (ref 50–180)
LYMPHOCYTES # BLD AUTO: 1518 CELLS/UL (ref 850–3900)
LYMPHOCYTES NFR BLD AUTO: 27.6 %
MCH RBC QN AUTO: 25.4 PG (ref 27–33)
MCHC RBC AUTO-ENTMCNC: 32.4 G/DL (ref 32–36)
MCV RBC AUTO: 78.2 FL (ref 80–100)
MONOCYTES # BLD AUTO: 473 CELLS/UL (ref 200–950)
MONOCYTES NFR BLD AUTO: 8.6 %
NEUTROPHILS # BLD AUTO: 3361 CELLS/UL (ref 1500–7800)
NEUTROPHILS NFR BLD AUTO: 61.1 %
PLATELET # BLD AUTO: 327 THOUSAND/UL (ref 140–400)
PMV BLD REES-ECKER: 10.1 FL (ref 7.5–12.5)
RBC # BLD AUTO: 4.77 MILLION/UL (ref 4.2–5.8)
TIBC SERPL-MCNC: 523 MCG/DL (CALC) (ref 250–425)
WBC # BLD AUTO: 5.5 THOUSAND/UL (ref 3.8–10.8)

## 2018-12-05 ENCOUNTER — OFFICE VISIT (OUTPATIENT)
Dept: INTERNAL MEDICINE CLINIC | Facility: CLINIC | Age: 53
End: 2018-12-05
Payer: COMMERCIAL

## 2018-12-05 VITALS
OXYGEN SATURATION: 98 % | HEART RATE: 86 BPM | SYSTOLIC BLOOD PRESSURE: 100 MMHG | WEIGHT: 152.4 LBS | DIASTOLIC BLOOD PRESSURE: 70 MMHG | TEMPERATURE: 97.7 F | BODY MASS INDEX: 27 KG/M2

## 2018-12-05 DIAGNOSIS — D50.9 IRON DEFICIENCY ANEMIA, UNSPECIFIED IRON DEFICIENCY ANEMIA TYPE: Primary | ICD-10-CM

## 2018-12-05 DIAGNOSIS — K21.9 GERD WITHOUT ESOPHAGITIS: ICD-10-CM

## 2018-12-05 DIAGNOSIS — M70.41 PREPATELLAR BURSITIS OF RIGHT KNEE: ICD-10-CM

## 2018-12-05 DIAGNOSIS — F31.60 BIPOLAR DISORDER, MIXED (HCC): ICD-10-CM

## 2018-12-05 DIAGNOSIS — L98.9 NON-HEALING SKIN LESION: ICD-10-CM

## 2018-12-05 PROBLEM — D64.9 ANEMIA: Status: RESOLVED | Noted: 2018-11-02 | Resolved: 2018-12-05

## 2018-12-05 PROCEDURE — 99214 OFFICE O/P EST MOD 30 MIN: CPT | Performed by: INTERNAL MEDICINE

## 2018-12-05 RX ORDER — NAPROXEN 500 MG/1
500 TABLET ORAL 2 TIMES DAILY WITH MEALS
Qty: 60 TABLET | Refills: 3 | Status: SHIPPED | OUTPATIENT
Start: 2018-12-05 | End: 2019-08-30 | Stop reason: ALTCHOICE

## 2018-12-05 RX ORDER — FERROUS SULFATE 325(65) MG
325 TABLET ORAL
Qty: 42 TABLET | Refills: 0 | Status: SHIPPED | OUTPATIENT
Start: 2018-12-05 | End: 2019-01-25 | Stop reason: SDUPTHER

## 2018-12-05 RX ORDER — QUETIAPINE FUMARATE 100 MG/1
100 TABLET, FILM COATED ORAL 2 TIMES DAILY
COMMUNITY

## 2018-12-05 NOTE — PATIENT INSTRUCTIONS
Patient be referred to Dermatology  We will obtain a follow-up CBC in 6 weeks     Will schedule a follow-up visit in 2 months

## 2018-12-05 NOTE — ASSESSMENT & PLAN NOTE
Will initiate ferrous sulfate 1 tablet daily for 6 weeks and repeat a CBC  Follow-up scheduled for 2 months

## 2018-12-05 NOTE — ASSESSMENT & PLAN NOTE
Naprosyn was renewed  The patient is currently taking omeprazole together with the naproxen  His labs did reveal evidence of an iron deficiency anemia  He denies any stool changes that suggest gastrointestinal bleeding  Recent stool immunochemical testing in August was negative  Reviewed signs and symptoms of upper GI complications such as gastritis and peptic ulcer disease

## 2018-12-05 NOTE — ASSESSMENT & PLAN NOTE
Continues on omeprazole  Patient currently without any signs or symptoms of reflux induced esophagitis or gastritis despite the use of NSAIDs

## 2018-12-05 NOTE — PROGRESS NOTES
Assessment/Plan:    Prepatellar bursitis of right knee  Naprosyn was renewed  The patient is currently taking omeprazole together with the naproxen  His labs did reveal evidence of an iron deficiency anemia  He denies any stool changes that suggest gastrointestinal bleeding  Recent stool immunochemical testing in August was negative  Reviewed signs and symptoms of upper GI complications such as gastritis and peptic ulcer disease  GERD without esophagitis  Continues on omeprazole  Patient currently without any signs or symptoms of reflux induced esophagitis or gastritis despite the use of NSAIDs  Bipolar disorder, mixed ([facility] [address] )  Clinically stable Seroquel was renewed  Iron deficiency anemia  Will initiate ferrous sulfate 1 tablet daily for 6 weeks and repeat a CBC  Follow-up scheduled for 2 months  Non-healing skin lesion  Patient be referred to Dermatology for nonhealing skin lesion of his right cheek       Diagnoses and all orders for this visit:    Iron deficiency anemia, unspecified iron deficiency anemia type  -     ferrous sulfate 325 (65 Fe) mg tablet; Take 1 tablet (325 mg total) by mouth daily with breakfast for 42 days  -     Cancel: CBC and Platelet; Future  -     CBC and Platelet; Future    Bipolar disorder, mixed ([facility])    Prepatellar bursitis of right knee  -     naproxen (NAPROSYN) 500 mg tablet; Take 1 tablet (500 mg total) by mouth 2 (two) times a day with meals    Non-healing skin lesion  -     Ambulatory referral to Dermatology; Future    GERD without esophagitis    Other orders  -     QUEtiapine (SEROquel) 100 mg tablet; Take 100 mg by mouth 2 (two) times a day          Subjective:      Patient ID: Almeda Claude is a 48 y o  male  Patient presents for follow-up visit for recent lab work that was done  His lab work was reviewed  It does reveal evidence of an iron deficiency anemia  Iron levels are low ferritin levels are low total iron binding capacity is elevated    An earlier CBC was 10 7 hemoglobin but his most recent CBC revealed a hemoglobin of 12 1 indices are microcytic hypochromic  Patient is taking naproxen for prepatellar bursitis of his right knee which has been stable  He also notes a nonhealing skin lesion on his right cheek that he would like evaluated  Family History   Problem Relation Age of Onset    No Known Problems Mother     No Known Problems Father     Ovarian cancer Other         Per Allscript    Diabetes type II Family         Mellitus wothout complication, without long-term current use     Social History     Social History    Marital status: Single     Spouse name: N/A    Number of children: N/A    Years of education: N/A     Occupational History    Not on file       Social History Main Topics    Smoking status: Current Every Day Smoker     Packs/day: 0 50     Years: 10 00     Types: Cigarettes    Smokeless tobacco: Never Used      Comment: smoking for 20 years    Alcohol use Yes      Comment: socially    Drug use: Yes     Types: Marijuana      Comment: smokes daily, last smoked 3/7/18 ;Per allscript No drug  use    Sexual activity: Not on file     Other Topics Concern    Not on file     Social History Narrative    No narrative on file     Past Medical History:   Diagnosis Date    Anemia     Anxiety     Arthritis     Asthma     Dermatitis of foot     Last Assessed 1/18/2016    Edentulous     Fracture of fibula     Distal; Last Assessed 7/11/2014    GERD (gastroesophageal reflux disease)     GERD (gastroesophageal reflux disease)     History of fusion of cervical spine     Hyperlipidemia     Low back pain     states on prednisone for a  few days for back    Marijuana smoker, episodic 2/17/2016    Moderate persistent asthma     Last Assessed 7/3/2014    Neurosis, anxiety, panic type     Resolved 8/20/2015    Prepatellar bursitis, right knee     Smoker     Tinnitus     Tobacco abuse     Last Assessed 1/18/2016    Wears glasses     reading       Current Outpatient Prescriptions:     albuterol (VENTOLIN HFA) 90 mcg/act inhaler, Inhale 1-2 puffs every 4 (four) hours as needed for wheezing or shortness of breath, Disp: 1 Inhaler, Rfl: 5    ALPRAZolam (XANAX) 0 25 mg tablet, Take 0 25 mg by mouth 2 (two) times a day as needed  , Disp: , Rfl:     atorvastatin (LIPITOR) 20 mg tablet, Take 1 tablet (20 mg total) by mouth daily, Disp: 90 tablet, Rfl: 1    DULoxetine (CYMBALTA) 60 mg delayed release capsule, Take 60 mg by mouth every morning, Disp: , Rfl: 0    ergocalciferol (VITAMIN D2) 50,000 units, Take 1 capsule (50,000 Units total) by mouth once a week, Disp: 12 capsule, Rfl: 0    fluticasone-salmeterol (ADVAIR) 100-50 mcg/dose inhaler, Inhale 1 puff 2 (two) times a day Rinse mouth after use , Disp: 1 Inhaler, Rfl: 5    lamoTRIgine (LaMICtal) 100 mg tablet, Take 1 tablet (100 mg total) by mouth every morning, Disp: 5 tablet, Rfl: 0    lidocaine (LMX) 4 % cream, Apply topically daily as needed for mild pain (low back), Disp: 30 g, Rfl: 1    naproxen (NAPROSYN) 500 mg tablet, Take 1 tablet (500 mg total) by mouth 2 (two) times a day with meals, Disp: 60 tablet, Rfl: 3    omeprazole (PriLOSEC) 40 MG capsule, Take 1 capsule (40 mg total) by mouth daily, Disp: 90 capsule, Rfl: 1    QUEtiapine (SEROquel) 100 mg tablet, Take 100 mg by mouth 2 (two) times a day, Disp: , Rfl:     ferrous sulfate 325 (65 Fe) mg tablet, Take 1 tablet (325 mg total) by mouth daily with breakfast for 42 days, Disp: 42 tablet, Rfl: 0    QUEtiapine (SEROquel) 200 mg tablet, Take 1 tablet (200 mg total) by mouth 2 (two) times a day (Patient not taking: Reported on 12/5/2018 ), Disp: 10 tablet, Rfl: 0  No Known Allergies  Past Surgical History:   Procedure Laterality Date    CERVICAL FUSION      NECK SURGERY      Last Assessed 5/02/2017    DC REMOVAL PREPATELLA BURSA Right 3/8/2018    Procedure: EXCISION BURSA PREPATELLAR;  Surgeon: Caitlyn Encinas MD; Location: [facility];  Service: Orthopedics    CT SHLDR ARTHROSCOP,SURG,W/ROTAT CUFF REPR Right 7/24/2017    Procedure: ARTHROSCOPY SHOULDER, ROTATOR CUFF REPAIR,OPEN BICEP TENODESIS;  Surgeon: Malika Harvey MD;  [facility];  Service: Orthopedics    TOOTH EXTRACTION           Review of Systems   Constitutional: Negative  HENT: Negative  Eyes: Negative  Respiratory: Negative  Cardiovascular: Negative  Gastrointestinal: Negative  Genitourinary: Negative  Musculoskeletal: Positive for arthralgias (Intermittent right knee pain)  Skin: Positive for wound (Nonhealing lesion of the right cheek)  Neurological: Negative  Hematological: Negative  Psychiatric/Behavioral: Negative  Objective:      /70 (BP Location: Right arm, Patient Position: Sitting, Cuff Size: Adult)   Pulse 86   Temp 97 7 °F (36 5 °C) (Oral)   Wt 69 1 kg (152 lb 6 4 oz) Comment: boots on  SpO2 98%   BMI 27 00 kg/m²          Physical Exam   Constitutional: He is oriented to person, place, and time  He appears well-developed and well-nourished  No distress  Somewhat disheveled in the overall appearance  HENT:   Head: Normocephalic and atraumatic  Left Ear: External ear normal    Eyes: Conjunctivae are normal    Neck: No JVD present  No tracheal deviation present  No thyromegaly present  Cardiovascular: Normal rate, regular rhythm and normal heart sounds  No murmur heard  Pulmonary/Chest: Effort normal  No respiratory distress  Abdominal: He exhibits no distension  Musculoskeletal: Normal range of motion  He exhibits no edema or deformity  Lymphadenopathy:     He has no cervical adenopathy  Neurological: He is alert and oriented to person, place, and time  No focal motor deficits   Skin: Skin is warm and dry  He is not diaphoretic  A small nonhealing skin lesion with a central ulceration is noted overlying the right maxillary sinus     Psychiatric: He has a normal mood and affect  Thought content normal    Vitals reviewed

## 2019-01-25 ENCOUNTER — OFFICE VISIT (OUTPATIENT)
Dept: INTERNAL MEDICINE CLINIC | Facility: CLINIC | Age: 54
End: 2019-01-25
Payer: COMMERCIAL

## 2019-01-25 VITALS
HEART RATE: 86 BPM | WEIGHT: 156.38 LBS | TEMPERATURE: 98.5 F | HEIGHT: 63 IN | BODY MASS INDEX: 27.71 KG/M2 | OXYGEN SATURATION: 98 % | DIASTOLIC BLOOD PRESSURE: 80 MMHG | SYSTOLIC BLOOD PRESSURE: 112 MMHG

## 2019-01-25 DIAGNOSIS — B86 SCABIES: ICD-10-CM

## 2019-01-25 DIAGNOSIS — D50.9 IRON DEFICIENCY ANEMIA, UNSPECIFIED IRON DEFICIENCY ANEMIA TYPE: ICD-10-CM

## 2019-01-25 DIAGNOSIS — E78.5 HYPERLIPIDEMIA, UNSPECIFIED HYPERLIPIDEMIA TYPE: ICD-10-CM

## 2019-01-25 DIAGNOSIS — K21.9 GASTROESOPHAGEAL REFLUX DISEASE WITHOUT ESOPHAGITIS: ICD-10-CM

## 2019-01-25 DIAGNOSIS — Z72.0 TOBACCO ABUSE: ICD-10-CM

## 2019-01-25 DIAGNOSIS — M70.41 PREPATELLAR BURSITIS OF RIGHT KNEE: ICD-10-CM

## 2019-01-25 DIAGNOSIS — K21.9 GERD WITHOUT ESOPHAGITIS: Primary | ICD-10-CM

## 2019-01-25 DIAGNOSIS — E55.9 VITAMIN D DEFICIENCY: ICD-10-CM

## 2019-01-25 DIAGNOSIS — E78.5 DYSLIPIDEMIA: ICD-10-CM

## 2019-01-25 DIAGNOSIS — R94.6 ABNORMAL THYROID FUNCTION TEST: ICD-10-CM

## 2019-01-25 DIAGNOSIS — L83 ACANTHOSIS NIGRICANS: ICD-10-CM

## 2019-01-25 DIAGNOSIS — J45.31 MILD PERSISTENT ASTHMA WITH ACUTE EXACERBATION: ICD-10-CM

## 2019-01-25 PROCEDURE — 99214 OFFICE O/P EST MOD 30 MIN: CPT | Performed by: PHYSICIAN ASSISTANT

## 2019-01-25 PROCEDURE — 3008F BODY MASS INDEX DOCD: CPT | Performed by: PHYSICIAN ASSISTANT

## 2019-01-25 RX ORDER — ERGOCALCIFEROL 1.25 MG/1
50000 CAPSULE ORAL WEEKLY
Qty: 4 CAPSULE | Refills: 3 | Status: SHIPPED | OUTPATIENT
Start: 2019-01-25

## 2019-01-25 RX ORDER — ATORVASTATIN CALCIUM 20 MG/1
20 TABLET, FILM COATED ORAL DAILY
Qty: 90 TABLET | Refills: 1 | Status: SHIPPED | OUTPATIENT
Start: 2019-01-25 | End: 2019-11-22 | Stop reason: SDUPTHER

## 2019-01-25 RX ORDER — ALBUTEROL SULFATE 90 UG/1
1-2 AEROSOL, METERED RESPIRATORY (INHALATION) EVERY 4 HOURS PRN
Qty: 1 INHALER | Refills: 5 | Status: SHIPPED | OUTPATIENT
Start: 2019-01-25 | End: 2019-10-07 | Stop reason: SDUPTHER

## 2019-01-25 RX ORDER — OMEPRAZOLE 40 MG/1
40 CAPSULE, DELAYED RELEASE ORAL DAILY
Qty: 90 CAPSULE | Refills: 1 | Status: SHIPPED | OUTPATIENT
Start: 2019-01-25 | End: 2019-08-15 | Stop reason: SDUPTHER

## 2019-01-25 RX ORDER — FERROUS SULFATE 325(65) MG
325 TABLET ORAL
Qty: 30 TABLET | Refills: 2 | Status: SHIPPED | OUTPATIENT
Start: 2019-01-25 | End: 2019-01-25 | Stop reason: SDUPTHER

## 2019-01-25 RX ORDER — ERGOCALCIFEROL 1.25 MG/1
50000 CAPSULE ORAL WEEKLY
Qty: 4 CAPSULE | Refills: 3 | Status: SHIPPED | OUTPATIENT
Start: 2019-01-25 | End: 2019-01-25 | Stop reason: SDUPTHER

## 2019-01-25 RX ORDER — FERROUS SULFATE 325(65) MG
325 TABLET ORAL
Qty: 30 TABLET | Refills: 2 | Status: SHIPPED | OUTPATIENT
Start: 2019-01-25 | End: 2019-07-17 | Stop reason: SDUPTHER

## 2019-01-25 RX ORDER — PERMETHRIN 50 MG/G
CREAM TOPICAL ONCE
Qty: 60 G | Refills: 0 | Status: SHIPPED | OUTPATIENT
Start: 2019-01-25 | End: 2019-01-25

## 2019-01-25 NOTE — ASSESSMENT & PLAN NOTE
Patient has been on iron for roughly 6 weeks  He has not yet scheduled with Gastroenterology to further evaluate his cause of iron deficient anemia    It is likely he will need an upper and lower endoscopy due to his underlying reflux as well as his current age which puts him at an age which he needs colon screening anyway

## 2019-01-25 NOTE — ASSESSMENT & PLAN NOTE
Stable at this time on inhalers  Patient notes he does not have a rescue inhaler at the current moment  He has a using Advair as directed    Refill for albuterol HFA given today

## 2019-01-25 NOTE — ASSESSMENT & PLAN NOTE
Gave encouragement with smoking cessation  Several treatment often options exist including patches gums as well as medications  Advised him to discuss with his psychiatrist the possibility of starting Chantix    Patient plans to discuss further with his psychiatrist and if his psychiatrist agrees will proceed with Chantix therapy

## 2019-01-25 NOTE — ASSESSMENT & PLAN NOTE
Patient previously was recommended to follow up with Gastroenterology due to underlying iron deficient anemia to further evaluate the cause  Patient has not yet scheduled this appointment but plans to do so  New referral given today    Discussed importance of calling and scheduling this appointment as soon as possible

## 2019-01-25 NOTE — ASSESSMENT & PLAN NOTE
Stable  Previously following with Orthopedics  Using pain medications p r n     Avoid NSAIDs with stomach upset

## 2019-01-25 NOTE — ASSESSMENT & PLAN NOTE
Patient with previously abnormal thyroid function test   He has not yet scheduled with endocrinologist and asked today if he can have a referral   Referral printed today    He was instructed to call back to our office if he has any difficulty getting in to see Endocrinology

## 2019-01-25 NOTE — PROGRESS NOTES
[facility] [address] [place] [address]  Standard Office Visit  Patient ID: Darcie Jaime    : 1965  Age/Gender: 48 y o  male     DATE: 2019      Assessment/Plan:    Acanthosis nigricans  Will send patient for A1c to check for any insulin insensitivity  Abnormal thyroid function test  Patient with previously abnormal thyroid function test   He has not yet scheduled with endocrinologist and asked today if he can have a referral   Referral printed today  He was instructed to call back to our office if he has any difficulty getting in to see Endocrinology    Dyslipidemia  Patient is taking Lipitor as directed  Iron deficiency anemia  Patient has been on iron for roughly 6 weeks  He has not yet scheduled with Gastroenterology to further evaluate his cause of iron deficient anemia  It is likely he will need an upper and lower endoscopy due to his underlying reflux as well as his current age which puts him at an age which he needs colon screening anyway    Tobacco abuse  Gave encouragement with smoking cessation  Several treatment often options exist including patches gums as well as medications  Advised him to discuss with his psychiatrist the possibility of starting Chantix  Patient plans to discuss further with his psychiatrist and if his psychiatrist agrees will proceed with Chantix therapy    Vitamin D deficiency  Continue vit d supplement    GERD without esophagitis  Patient previously was recommended to follow up with Gastroenterology due to underlying iron deficient anemia to further evaluate the cause  Patient has not yet scheduled this appointment but plans to do so  New referral given today  Discussed importance of calling and scheduling this appointment as soon as possible    Asthma  Stable at this time on inhalers  Patient notes he does not have a rescue inhaler at the current moment  He has a using Advair as directed    Refill for albuterol HFA given today    Prepatellar bursitis of right knee  Stable  Previously following with Orthopedics  Using pain medications p r n     Avoid NSAIDs with stomach upset       Diagnoses and all orders for this visit:    GERD without esophagitis  -     Ambulatory referral to Gastroenterology; Future  -     omeprazole (PriLOSEC) 40 MG capsule; Take 1 capsule (40 mg total) by mouth daily    Iron deficiency anemia, unspecified iron deficiency anemia type  -     Discontinue: ferrous sulfate 325 (65 Fe) mg tablet; Take 1 tablet (325 mg total) by mouth daily with breakfast for 30 days  -     Ferritin; Future  -     CBC and differential; Future  -     ferrous sulfate 325 (65 Fe) mg tablet; Take 1 tablet (325 mg total) by mouth daily with breakfast for 30 days    Abnormal thyroid function test  -     Ambulatory referral to Endocrinology; Future    Dyslipidemia    Tobacco abuse    Prepatellar bursitis of right knee  -     Discontinue: ergocalciferol (VITAMIN D2) 50,000 units; Take 1 capsule (50,000 Units total) by mouth once a week  -     ergocalciferol (VITAMIN D2) 50,000 units; Take 1 capsule (50,000 Units total) by mouth once a week  -     albuterol (VENTOLIN HFA) 90 mcg/act inhaler; Inhale 1-2 puffs every 4 (four) hours as needed for wheezing or shortness of breath    Vitamin D deficiency    Mild persistent asthma with acute exacerbation  -     fluticasone-salmeterol (ADVAIR) 100-50 mcg/dose inhaler; Inhale 1 puff 2 (two) times a day Rinse mouth after use  Acanthosis nigricans  -     Hemoglobin A1C; Future    Scabies  -     permethrin (ELIMITE) 5 % cream; Apply topically once for 1 dose    Hyperlipidemia, unspecified hyperlipidemia type  -     atorvastatin (LIPITOR) 20 mg tablet; Take 1 tablet (20 mg total) by mouth daily    Gastroesophageal reflux disease without esophagitis  -     omeprazole (PriLOSEC) 40 MG capsule;  Take 1 capsule (40 mg total) by mouth daily          Subjective:   Chief Complaint   Patient presents with    Follow-up     review blood work     Anxiety    GERD    Asthma         Kyra Sam is a 48 y o  male who presents to the office on 1/25/2019 for     For follow up  He notes that since last visit with myself he has not yet gotten a change to see a endocrinologist   He also has not yet seen a gastroenterologist   He would like a referral to see a gastro specialist we had spoke about last time  He did not get a holter monitor since his last visit  He notes he tried to make changes we discussed last visit  His symptoms resolved  He has been following with [facility] for psychiatric services since leaving [facility]  He notes his therapist of 7 years has been out of the office because she has been ill  He now is working to get back in with his counselor  He follows with a psychiatrist   He notes he started following with [facility] and has a new therapist which and psychiatrist with whom he gets along with  Back pain controlled somewhat with medicinal marijuana  Continues to have GERD breakthrough symptoms  Notes he may be getting a new housing assignment and is looking forward to it  Anxiety   Presents for follow-up visit  Patient reports no chest pain, confusion, dizziness, excessive worry, nausea, nervous/anxious behavior, palpitations or shortness of breath  The quality of sleep is fair  His past medical history is significant for anemia and asthma  Asthma   He complains of cough (Chronic unchanged cough), hoarse voice and wheezing ( intermittent wheeze  No shortness of breath )  There is no chest tightness, difficulty breathing, frequent throat clearing, hemoptysis or shortness of breath  Associated symptoms include heartburn  Pertinent negatives include no chest pain, fever, malaise/fatigue, nasal congestion, postnasal drip, rhinorrhea or weight loss  His past medical history is significant for asthma  Rash   This is a recurrent problem   The current episode started more than 1 month ago  The problem is unchanged  The rash is diffuse (Upper and lower extremities  Palms and soles  )  The rash is characterized by itchiness  Associated with: Notes his roommate may have similar symptoms  Does have some cleanliness issues in his current home med he is concerned may be contributing  Associated symptoms include coughing (Chronic unchanged cough)  Pertinent negatives include no diarrhea, fever, rhinorrhea, shortness of breath or vomiting  His past medical history is significant for asthma  Anemia   Presents for follow-up (Notes he is taking iron as directed  Unsure if he needs a refill or if he has more at home) visit  There has been no abdominal pain, confusion, fever, leg swelling, light-headedness, malaise/fatigue, palpitations or weight loss  Signs of blood loss that are not present include hematemesis, hematochezia and melena  Compliance problems: Notes he has not yet scheduled with Gastroenterology  Nicotine Dependence   Presents for follow-up visit  His urge triggers include company of smokers  The symptoms have been stable  Number of cigarettes per day: 1/2 ppd  The following portions of the patient's history were reviewed and updated as appropriate: allergies, current medications, past family history, past medical history, past social history, past surgical history and problem list     Review of Systems   Constitutional: Negative for chills, fever, malaise/fatigue and weight loss  HENT: Positive for hoarse voice  Negative for postnasal drip and rhinorrhea  Respiratory: Positive for cough (Chronic unchanged cough) and wheezing ( intermittent wheeze  No shortness of breath )  Negative for hemoptysis and shortness of breath  Notes breathing has been good as of late  Uses inhalers, feels that they help   Cardiovascular: Negative for chest pain and palpitations  Gastrointestinal: Positive for heartburn   Negative for abdominal pain, blood in stool, constipation, diarrhea, hematemesis, hematochezia, melena, nausea and vomiting  No dark tarry stools   Genitourinary: Negative for dysuria  Musculoskeletal:        Low back pain intermittent, Right shoulder at times   Skin: Positive for rash  Itching rash on upper and lower extremities  Hands and ankles are affected as well  Itching found in antecubital fossa and under his armpits as well as at his neck line   Neurological: Negative for dizziness, syncope and light-headedness  Psychiatric/Behavioral: Negative for confusion  The patient is not nervous/anxious            Patient Active Problem List   Diagnosis    Prepatellar bursitis of right knee    Tobacco abuse    GERD without esophagitis    Incomplete tear of right rotator cuff    Low back pain    Neck pain    Nicotine dependence    S/P cervical spinal fusion    Vitamin D deficiency    Hypertriglyceridemia    HNP (herniated nucleus pulposus) with myelopathy, cervical    Dyslipidemia    Cervical radiculopathy    Bipolar disorder, mixed ([facility] [address] )    Asthma    Screening for colon cancer    Abnormal thyroid function test    Chronic right-sided low back pain without sciatica    Palpitation    Marijuana smoker, episodic    Chronic pain    Iron deficiency anemia    Non-healing skin lesion    Acanthosis nigricans    Scabies       Past Medical History:   Diagnosis Date    Anemia     Anxiety     Arthritis     Asthma     Dermatitis of foot     Last Assessed 1/18/2016    Edentulous     Fracture of fibula     Distal; Last Assessed 7/11/2014    GERD (gastroesophageal reflux disease)     GERD (gastroesophageal reflux disease)     History of fusion of cervical spine     Hyperlipidemia     Low back pain     states on prednisone for a  few days for back    Marijuana smoker, episodic 2/17/2016    Moderate persistent asthma     Last Assessed 7/3/2014    Neurosis, anxiety, panic type     Resolved 8/20/2015    Prepatellar bursitis, right knee     Smoker     Tinnitus     Tobacco abuse     Last Assessed 1/18/2016    Wears glasses     reading       Past Surgical History:   Procedure Laterality Date    CERVICAL FUSION      NECK SURGERY      Last Assessed 5/02/2017    PA REMOVAL PREPATELLA BURSA Right 3/8/2018    Procedure: EXCISION BURSA PREPATELLAR;  Surgeon: Stefania Hernandez MD;  [facility];  Service: Orthopedics    PA SHLDR ARTHROSCOP,SURG,W/ROTAT CUFF REPR Right 7/24/2017    Procedure: ARTHROSCOPY SHOULDER, ROTATOR CUFF REPAIR,OPEN BICEP TENODESIS;  Surgeon: Stefania Hernandez MD;  Location[facility];  Service: Orthopedics    TOOTH EXTRACTION           Current Outpatient Prescriptions:     albuterol (VENTOLIN HFA) 90 mcg/act inhaler, Inhale 1-2 puffs every 4 (four) hours as needed for wheezing or shortness of breath, Disp: 1 Inhaler, Rfl: 5    ALPRAZolam (XANAX) 0 25 mg tablet, Take 0 25 mg by mouth 2 (two) times a day as needed  , Disp: , Rfl:     atorvastatin (LIPITOR) 20 mg tablet, Take 1 tablet (20 mg total) by mouth daily, Disp: 90 tablet, Rfl: 1    DULoxetine (CYMBALTA) 60 mg delayed release capsule, Take 60 mg by mouth every morning, Disp: , Rfl: 0    ergocalciferol (VITAMIN D2) 50,000 units, Take 1 capsule (50,000 Units total) by mouth once a week, Disp: 4 capsule, Rfl: 3    fluticasone-salmeterol (ADVAIR) 100-50 mcg/dose inhaler, Inhale 1 puff 2 (two) times a day Rinse mouth after use , Disp: 1 Inhaler, Rfl: 5    lamoTRIgine (LaMICtal) 100 mg tablet, Take 1 tablet (100 mg total) by mouth every morning, Disp: 5 tablet, Rfl: 0    lidocaine (LMX) 4 % cream, Apply topically daily as needed for mild pain (low back), Disp: 30 g, Rfl: 1    naproxen (NAPROSYN) 500 mg tablet, Take 1 tablet (500 mg total) by mouth 2 (two) times a day with meals, Disp: 60 tablet, Rfl: 3    omeprazole (PriLOSEC) 40 MG capsule, Take 1 capsule (40 mg total) by mouth daily, Disp: 90 capsule, Rfl: 1    QUEtiapine (SEROquel) 100 mg tablet, Take 100 mg by mouth 2 (two) times a day, Disp: , Rfl:     QUEtiapine (SEROquel) 200 mg tablet, Take 1 tablet (200 mg total) by mouth 2 (two) times a day, Disp: 10 tablet, Rfl: 0    ferrous sulfate 325 (65 Fe) mg tablet, Take 1 tablet (325 mg total) by mouth daily with breakfast for 30 days, Disp: 30 tablet, Rfl: 2    permethrin (ELIMITE) 5 % cream, Apply topically once for 1 dose, Disp: 60 g, Rfl: 0    No Known Allergies    Social History     Social History    Marital status: Single     Spouse name: N/A    Number of children: N/A    Years of education: N/A     Social History Main Topics    Smoking status: Current Every Day Smoker     Packs/day: 0 50     Years: 10 00     Types: Cigarettes    Smokeless tobacco: Never Used      Comment: smoking for 20 years    Alcohol use Yes      Comment: socially    Drug use: Yes     Types: Marijuana      Comment: smokes daily, last smoked 3/7/18 ;Per allscript No drug  use    Sexual activity: Not Asked     Other Topics Concern    None     Social History Narrative    None       Family History   Problem Relation Age of Onset    No Known Problems Mother     No Known Problems Father     Ovarian cancer Other         Per Allscript    Diabetes type II Family         Mellitus wothout complication, without long-term current use       PHQ-9 Depression Screening    PHQ-9:    Frequency of the following problems over the past two weeks:              Health Maintenance   Topic Date Due    CRC Screening: Colonoscopy  1965    Pneumococcal PPSV23 Medium Risk Adult (1 of 1 - PPSV23) 06/28/1986    Depression Screening PHQ  02/28/2019    CRC Screening: FOBTx3/FIT  08/02/2019    DTaP,Tdap,and Td Vaccines (2 - Td) 02/07/2027    INFLUENZA VACCINE  Completed       Immunization History   Administered Date(s) Administered    Influenza 11/16/2018    Influenza Quadrivalent, 6-35 Months IM 10/20/2016    Influenza TIV (IM) 10/18/2015    Td (adult), adsorbed 09/27/2013  Tdap 02/07/2017        Objective:  Vitals:    01/25/19 1149   BP: 112/80   BP Location: Left arm   Patient Position: Sitting   Cuff Size: Adult   Pulse: 86   Temp: 98 5 °F (36 9 °C)   TempSrc: Oral   SpO2: 98%   Weight: 70 9 kg (156 lb 6 1 oz)   Height: 5' 3" (1 6 m)     Wt Readings from Last 3 Encounters:   01/25/19 70 9 kg (156 lb 6 1 oz)   12/05/18 69 1 kg (152 lb 6 4 oz)   11/07/18 72 kg (158 lb 12 8 oz)     Body mass index is 27 7 kg/m²  No exam data present       Physical Exam   Constitutional: He is oriented to person, place, and time  He appears well-developed and well-nourished  No distress  Alert pleasant cooperative  Seated in room in no acute distress  Polite   HENT:   Head: Normocephalic and atraumatic  Mouth/Throat: Oropharynx is clear and moist  No oropharyngeal exudate  Moist mucous membranes  Normal posterior pharynx   Eyes: Pupils are equal, round, and reactive to light  Right eye exhibits no discharge  Left eye exhibits no discharge  No scleral icterus  Neck: Neck supple  Cardiovascular: Normal rate, regular rhythm and normal heart sounds  No murmur heard  Pulmonary/Chest: Effort normal  No respiratory distress  He has wheezes (Intermittent scattered late expiratory wheeze  No crackles no rhonchi)  He has no rales  No respiratory distress   Abdominal: Soft  Bowel sounds are normal  He exhibits no distension  There is no tenderness  There is no guarding  Soft nontender nondistended positive bowel sounds   Musculoskeletal: He exhibits no edema  No knee effusion or tenderness to palpation on exam   Neurological: He is alert and oriented to person, place, and time  No gross focal neurologic deficits on exam   Skin: Skin is warm and dry  Rash noted  He is not diaphoretic  Hyper pigmented acanthosis nigricans rash back of neck bilaterally    Erythematous papules with surrounding park score a shunt located on upper and lower extremities scattered throughout    Webspaces of hand are affected   Psychiatric: He has a normal mood and affect  His behavior is normal  Thought content normal    Nursing note and vitals reviewed            Future Appointments  Date Time Provider Lorena Bhardwaji   3/22/2019 1:00 PM Michelle Higuera PA-C [address] [place] [address]    Patient Care Team:  Emmett Bauman MD as PCP - General (Internal Medicine)  DO Delfin Leslie MD

## 2019-01-25 NOTE — PATIENT INSTRUCTIONS
Acanthosis nigricans  Will send patient for A1c to check for any insulin insensitivity  Abnormal thyroid function test  Patient with previously abnormal thyroid function test   He has not yet scheduled with endocrinologist and asked today if he can have a referral   Referral printed today  He was instructed to call back to our office if he has any difficulty getting in to see Endocrinology    Dyslipidemia  Patient is taking Lipitor as directed  Iron deficiency anemia  Patient has been on iron for roughly 6 weeks  He has not yet scheduled with Gastroenterology to further evaluate his cause of iron deficient anemia  It is likely he will need an upper and lower endoscopy due to his underlying reflux as well as his current age which puts him at an age which he needs colon screening anyway    Tobacco abuse  Gave encouragement with smoking cessation  Several treatment often options exist including patches gums as well as medications  Advised him to discuss with his psychiatrist the possibility of starting Chantix    Patient plans to discuss further with his psychiatrist and if his psychiatrist agrees will proceed with Chantix therapy    Vitamin D deficiency  Continue vit d supplement

## 2019-02-21 ENCOUNTER — TELEPHONE (OUTPATIENT)
Dept: GASTROENTEROLOGY | Facility: CLINIC | Age: 54
End: 2019-02-21

## 2019-02-21 NOTE — TELEPHONE ENCOUNTER
EGD rescheduled to 3/28/19 with Dr Stevo Purcell at [facility], e-mail sent to Denzel Angeles for transportation

## 2019-03-21 ENCOUNTER — TELEPHONE (OUTPATIENT)
Dept: GASTROENTEROLOGY | Facility: CLINIC | Age: 54
End: 2019-03-21

## 2019-03-21 NOTE — TELEPHONE ENCOUNTER
Pt has transportation set up per Beau Adames pts arrival time is 830am and they will call him the night before to let pt know  time

## 2019-03-21 NOTE — TELEPHONE ENCOUNTER
Fiordaliza pt    Pt is scheduled on 3/28 and has no transportation   Please advise if transport could be set up for him [phone]

## 2019-03-27 ENCOUNTER — ANESTHESIA EVENT (OUTPATIENT)
Dept: GASTROENTEROLOGY | Facility: MEDICAL CENTER | Age: 54
End: 2019-03-27

## 2019-03-28 ENCOUNTER — TELEPHONE (OUTPATIENT)
Dept: GASTROENTEROLOGY | Facility: CLINIC | Age: 54
End: 2019-03-28

## 2019-03-28 ENCOUNTER — ANESTHESIA (OUTPATIENT)
Dept: GASTROENTEROLOGY | Facility: MEDICAL CENTER | Age: 54
End: 2019-03-28

## 2019-04-10 LAB
BASOPHILS # BLD AUTO: 31 CELLS/UL (ref 0–200)
BASOPHILS NFR BLD AUTO: 0.4 %
EOSINOPHIL # BLD AUTO: 78 CELLS/UL (ref 15–500)
EOSINOPHIL NFR BLD AUTO: 1 %
ERYTHROCYTE [DISTWIDTH] IN BLOOD BY AUTOMATED COUNT: 17 % (ref 11–15)
FERRITIN SERPL-MCNC: 94 NG/ML (ref 20–380)
HBA1C MFR BLD: 5 % OF TOTAL HGB
HCT VFR BLD AUTO: 43.8 % (ref 38.5–50)
HGB BLD-MCNC: 14.8 G/DL (ref 13.2–17.1)
LYMPHOCYTES # BLD AUTO: 1248 CELLS/UL (ref 850–3900)
LYMPHOCYTES NFR BLD AUTO: 16 %
MCH RBC QN AUTO: 31 PG (ref 27–33)
MCHC RBC AUTO-ENTMCNC: 33.8 G/DL (ref 32–36)
MCV RBC AUTO: 91.6 FL (ref 80–100)
MONOCYTES # BLD AUTO: 530 CELLS/UL (ref 200–950)
MONOCYTES NFR BLD AUTO: 6.8 %
NEUTROPHILS # BLD AUTO: 5912 CELLS/UL (ref 1500–7800)
NEUTROPHILS NFR BLD AUTO: 75.8 %
PLATELET # BLD AUTO: 207 THOUSAND/UL (ref 140–400)
PMV BLD REES-ECKER: 10.3 FL (ref 7.5–12.5)
RBC # BLD AUTO: 4.78 MILLION/UL (ref 4.2–5.8)
WBC # BLD AUTO: 7.8 THOUSAND/UL (ref 3.8–10.8)

## 2019-05-01 ENCOUNTER — TELEPHONE (OUTPATIENT)
Dept: INTERNAL MEDICINE CLINIC | Age: 54
End: 2019-05-01

## 2019-05-01 ENCOUNTER — TELEPHONE (OUTPATIENT)
Dept: GASTROENTEROLOGY | Facility: CLINIC | Age: 54
End: 2019-05-01

## 2019-05-01 DIAGNOSIS — R94.6 ABNORMAL THYROID FUNCTION TEST: Primary | ICD-10-CM

## 2019-05-14 ENCOUNTER — TELEPHONE (OUTPATIENT)
Dept: GASTROENTEROLOGY | Facility: MEDICAL CENTER | Age: 54
End: 2019-05-14

## 2019-05-14 ENCOUNTER — PREP FOR PROCEDURE (OUTPATIENT)
Dept: GASTROENTEROLOGY | Facility: MEDICAL CENTER | Age: 54
End: 2019-05-14

## 2019-05-14 DIAGNOSIS — K21.9 GERD WITHOUT ESOPHAGITIS: Primary | ICD-10-CM

## 2019-05-20 ENCOUNTER — TELEPHONE (OUTPATIENT)
Dept: GASTROENTEROLOGY | Facility: MEDICAL CENTER | Age: 54
End: 2019-05-20

## 2019-05-20 ENCOUNTER — ANESTHESIA EVENT (OUTPATIENT)
Dept: GASTROENTEROLOGY | Facility: MEDICAL CENTER | Age: 54
End: 2019-05-20

## 2019-05-20 RX ORDER — SODIUM CHLORIDE 9 MG/ML
125 INJECTION, SOLUTION INTRAVENOUS CONTINUOUS
Status: CANCELLED | OUTPATIENT
Start: 2019-05-20

## 2019-05-21 ENCOUNTER — HOSPITAL ENCOUNTER (OUTPATIENT)
Dept: GASTROENTEROLOGY | Facility: MEDICAL CENTER | Age: 54
Setting detail: OUTPATIENT SURGERY
Discharge: HOME | End: 2019-05-21
Attending: INTERNAL MEDICINE

## 2019-05-21 ENCOUNTER — ANESTHESIA (OUTPATIENT)
Dept: GASTROENTEROLOGY | Facility: MEDICAL CENTER | Age: 54
End: 2019-05-21

## 2019-06-14 ENCOUNTER — TELEPHONE (OUTPATIENT)
Dept: INTERNAL MEDICINE CLINIC | Facility: CLINIC | Age: 54
End: 2019-06-14

## 2019-07-17 DIAGNOSIS — D50.9 IRON DEFICIENCY ANEMIA, UNSPECIFIED IRON DEFICIENCY ANEMIA TYPE: ICD-10-CM

## 2019-07-17 RX ORDER — FERROUS SULFATE 325(65) MG
325 TABLET ORAL
Qty: 90 TABLET | Refills: 1 | Status: SHIPPED | OUTPATIENT
Start: 2019-07-17 | End: 2020-01-13

## 2019-07-17 NOTE — TELEPHONE ENCOUNTER
Patient would like a refill of the ferrous sulfate 325 sent to [facility] on [phone], [place]

## 2019-07-26 ENCOUNTER — TELEPHONE (OUTPATIENT)
Dept: INTERNAL MEDICINE CLINIC | Facility: CLINIC | Age: 54
End: 2019-07-26

## 2019-07-26 NOTE — TELEPHONE ENCOUNTER
Tried calling patient about appointment he no showed for today, unable to leave message, the call goes right to a busy signal

## 2019-08-14 ENCOUNTER — TELEPHONE (OUTPATIENT)
Dept: GASTROENTEROLOGY | Facility: AMBULARY SURGERY CENTER | Age: 54
End: 2019-08-14

## 2019-08-14 DIAGNOSIS — K21.9 GASTROESOPHAGEAL REFLUX DISEASE WITHOUT ESOPHAGITIS: ICD-10-CM

## 2019-08-14 DIAGNOSIS — K21.9 GERD WITHOUT ESOPHAGITIS: ICD-10-CM

## 2019-08-14 NOTE — TELEPHONE ENCOUNTER
Needs refill on omeprazole 40mg sent to [facility] on [address] [phone] [address] in [facility]

## 2019-08-15 RX ORDER — OMEPRAZOLE 40 MG/1
40 CAPSULE, DELAYED RELEASE ORAL DAILY
Qty: 90 CAPSULE | Refills: 1 | Status: SHIPPED | OUTPATIENT
Start: 2019-08-15 | End: 2019-08-30 | Stop reason: SDUPTHER

## 2019-08-29 RX ORDER — HYDROXYZINE 50 MG/1
50 TABLET, FILM COATED ORAL 3 TIMES DAILY
Refills: 0 | COMMUNITY
Start: 2019-08-09

## 2019-08-30 ENCOUNTER — OFFICE VISIT (OUTPATIENT)
Dept: INTERNAL MEDICINE CLINIC | Facility: CLINIC | Age: 54
End: 2019-08-30
Payer: COMMERCIAL

## 2019-08-30 VITALS
BODY MASS INDEX: 24.98 KG/M2 | HEIGHT: 63 IN | WEIGHT: 141 LBS | SYSTOLIC BLOOD PRESSURE: 104 MMHG | TEMPERATURE: 97.7 F | DIASTOLIC BLOOD PRESSURE: 78 MMHG | HEART RATE: 84 BPM | OXYGEN SATURATION: 98 %

## 2019-08-30 DIAGNOSIS — K21.9 GASTROESOPHAGEAL REFLUX DISEASE WITHOUT ESOPHAGITIS: ICD-10-CM

## 2019-08-30 DIAGNOSIS — R94.6 ABNORMAL THYROID FUNCTION TEST: ICD-10-CM

## 2019-08-30 DIAGNOSIS — J45.31 MILD PERSISTENT ASTHMA WITH ACUTE EXACERBATION: ICD-10-CM

## 2019-08-30 DIAGNOSIS — Z12.11 SCREENING FOR COLON CANCER: ICD-10-CM

## 2019-08-30 DIAGNOSIS — D50.9 IRON DEFICIENCY ANEMIA, UNSPECIFIED IRON DEFICIENCY ANEMIA TYPE: ICD-10-CM

## 2019-08-30 DIAGNOSIS — E78.5 DYSLIPIDEMIA: ICD-10-CM

## 2019-08-30 DIAGNOSIS — Z12.11 ENCOUNTER FOR SCREENING FOR MALIGNANT NEOPLASM OF COLON: Primary | ICD-10-CM

## 2019-08-30 DIAGNOSIS — K21.9 GERD WITHOUT ESOPHAGITIS: ICD-10-CM

## 2019-08-30 DIAGNOSIS — Z72.0 TOBACCO ABUSE: ICD-10-CM

## 2019-08-30 DIAGNOSIS — E55.9 VITAMIN D DEFICIENCY: ICD-10-CM

## 2019-08-30 PROCEDURE — 3008F BODY MASS INDEX DOCD: CPT | Performed by: PHYSICIAN ASSISTANT

## 2019-08-30 PROCEDURE — 99214 OFFICE O/P EST MOD 30 MIN: CPT | Performed by: PHYSICIAN ASSISTANT

## 2019-08-30 RX ORDER — OMEPRAZOLE 40 MG/1
40 CAPSULE, DELAYED RELEASE ORAL DAILY
Qty: 90 CAPSULE | Refills: 0 | Status: SHIPPED | OUTPATIENT
Start: 2019-08-30 | End: 2019-11-22 | Stop reason: SDUPTHER

## 2019-08-30 NOTE — ASSESSMENT & PLAN NOTE
Patient was referred previously for persistently abnormal thyroid function tests  He will work with his ICM worker to schedule apt  Referral placed today

## 2019-08-30 NOTE — ASSESSMENT & PLAN NOTE
Patient has been on vitamin d supplement for some time  Will repeat level and pending results will reduce from weekly supplement

## 2019-08-30 NOTE — ASSESSMENT & PLAN NOTE
Patient has been following with Gastroenterology  Previously he was recommended to get an endoscopy which was difficult due to transportation  He is working with his ICM worker to get transportation to and from the procedure  He notes that his ICM worker has set up transportation and he has endoscopy scheduled for October  Continue with Prilosec as directed  Advised to use Tylenol as needed for pain if it develops  Advised him to discontinue Naprosyn completely due to the risk of worsening stomach irritation  Continue with diet and lifestyle modifications to help reduce GERD symptoms  Avoid trigger foods  Avoid spicy acidic foods  Avoid tobacco   Recommended tobacco cessation  Avoid caffeine

## 2019-08-30 NOTE — ASSESSMENT & PLAN NOTE
Discussed smoking cessation options including nicotine patches which patient has used several times in the past without any improvement  Discussed additional treatment options for smoking cessation including Wellbutrin as well as Chantix  I discussed that both of these medications are prescriptions and we could discuss them further after he consults with his psychiatrist   Joaquina Melgar him to discuss both of these treatment options with his psychiatrist for their input on which they feel he would be best suited for  Will discuss further at follow-up with labs in 6 weeks  Will await psychiatry input on smoking cessation mediations

## 2019-08-30 NOTE — PATIENT INSTRUCTIONS
GERD without esophagitis  Patient has been following with Gastroenterology  Previously he was recommended to get an endoscopy which was difficult due to transportation  He is working with his ICM worker to get transportation to and from the procedure  He notes that his ICM worker has set up transportation and he has endoscopy scheduled for October  Continue with Prilosec as directed  Advised to use Tylenol as needed for pain if it develops  Advised him to discontinue Naprosyn completely due to the risk of worsening stomach irritation  Continue with diet and lifestyle modifications to help reduce GERD symptoms  Avoid trigger foods  Avoid spicy acidic foods  Avoid tobacco   Recommended tobacco cessation  Avoid caffeine  Asthma  Stable on advair with PRN albuterol  Discussed smoking cessation  Abnormal thyroid function test  Patient was referred previously for persistently abnormal thyroid function tests  He will work with his ICM worker to schedule apt  Referral placed today  Dyslipidemia  Patient has been on Lipitor for some time  Will repeat lipid panel with [facility]  Iron deficiency anemia  Taking iron  Griffin increased on labs earlier this year  Will repeat CBC  Screening for colon cancer  Continue with GI as scheduled  Tobacco abuse  Discussed smoking cessation options including nicotine patches which patient has used several times in the past without any improvement  Discussed additional treatment options for smoking cessation including Wellbutrin as well as Chantix  I discussed that both of these medications are prescriptions and we could discuss them further after he consults with his psychiatrist   Gilbert Escort him to discuss both of these treatment options with his psychiatrist for their input on which they feel he would be best suited for  Will discuss further at follow-up with labs in 6 weeks    Vitamin D deficiency  Patient has been on vitamin d supplement for some time  Will repeat level and pending results will reduce from weekly supplement

## 2019-08-30 NOTE — PROGRESS NOTES
[facility] [address] [place] [address]  Standard Office Visit  Patient ID: Ap Hernandez    : 1965  Age/Gender: 48 y o  male     DATE: 2019      Assessment/Plan:    GERD without esophagitis  Patient has been following with Gastroenterology  Previously he was recommended to get an endoscopy which was difficult due to transportation  He is working with his ICM worker to get transportation to and from the procedure  He notes that his ICM worker has set up transportation and he has endoscopy scheduled for October  Continue with Prilosec as directed  Advised to use Tylenol as needed for pain if it develops  Advised him to discontinue Naprosyn completely due to the risk of worsening stomach irritation  Continue with diet and lifestyle modifications to help reduce GERD symptoms  Avoid trigger foods  Avoid spicy acidic foods  Avoid tobacco   Recommended tobacco cessation  Avoid caffeine  Asthma  Stable on advair with PRN albuterol  Discussed smoking cessation  Abnormal thyroid function test  Patient was referred previously for persistently abnormal thyroid function tests  He will work with his ICM worker to schedule apt  Referral placed today  Dyslipidemia  Patient has been on Lipitor for some time  Will repeat lipid panel with CMP  Iron deficiency anemia  Taking iron  Griffin increased on labs earlier this year  Will repeat CBC  Screening for colon cancer  Continue with GI as scheduled  Tobacco abuse  Discussed smoking cessation options including nicotine patches which patient has used several times in the past without any improvement  Discussed additional treatment options for smoking cessation including Wellbutrin as well as Chantix    I discussed that both of these medications are prescriptions and we could discuss them further after he consults with his psychiatrist   Alex Muñiz him to discuss both of these treatment options with his psychiatrist for their input on which they feel he would be best suited for  Will discuss further at follow-up with labs in 6 weeks  Will await psychiatry input on smoking cessation mediations  Vitamin D deficiency  Patient has been on vitamin d supplement for some time  Will repeat level and pending results will reduce from weekly supplement  Diagnoses and all orders for this visit:    Encounter for screening for malignant neoplasm of colon  -     Occult Blood, Fecal Immunochemical; Future    GERD without esophagitis  -     CBC and differential; Future  -     Comprehensive metabolic panel; Future  -     omeprazole (PriLOSEC) 40 MG capsule; Take 1 capsule (40 mg total) by mouth daily    Mild persistent asthma with acute exacerbation    Abnormal thyroid function test  -     Ambulatory referral to Endocrinology; Future    Dyslipidemia  -     Lipid Panel with Direct LDL reflex; Future  -     Comprehensive metabolic panel; Future    Iron deficiency anemia, unspecified iron deficiency anemia type  -     CBC and differential; Future  -     Comprehensive metabolic panel; Future    Vitamin D deficiency  -     Vitamin D 25 hydroxy; Future    Tobacco abuse  -     Lipid Panel with Direct LDL reflex; Future    Gastroesophageal reflux disease without esophagitis  -     omeprazole (PriLOSEC) 40 MG capsule; Take 1 capsule (40 mg total) by mouth daily    Screening for colon cancer  -     Occult Blood, Fecal Immunochemical; Future    Other orders  -     hydrOXYzine HCL (ATARAX) 50 mg tablet; Take 50 mg by mouth 3 (three) times a day           Subjective:   Chief Complaint   Patient presents with    Follow-up     Pt is here for f/u, no recent bw, refills not needed  Saleem Villeda is a 48 y o  male who presents to the office on 8/30/2019 for     For follow up  Notes he is [facility] with [facility]  Trying to get new housing  Notes ICM worker has helped him  Patient has medical marijuana licence    He follows with psychiatrist and therapist at [facility]  Feels comfortable there  Follows with therapist as well  Taking meds as directed  Notes he has not had ETOH regularly (a single beer on occasion)  Has not needed inhaler often  Trying to cut back on smoking  Has seen endocrinologist   Has apt in October for EGD  He notes he is avoiding provoking foods  He is considering smoking cesssation and wants to know more about some of the mediations available  Tried patches in the past which did not work  Heartburn   He complains of heartburn  He reports no abdominal pain, no chest pain, no coughing, no early satiety, no nausea or no wheezing  This is a chronic problem  The problem has been waxing and waning  The symptoms are aggravated by certain foods  Pertinent negatives include no anemia, fatigue or melena  Risk factors include caffeine use and ETOH use (hx of ETOH use)  He has tried a PPI for the symptoms  The treatment provided mild relief  Past procedures include an EGD (scheduled)  Thyroid Problem   Presents for follow-up visit  Patient reports no constipation, depressed mood, diarrhea, fatigue or palpitations  The symptoms have been stable  Asthma   There is no chest tightness, cough, difficulty breathing, shortness of breath or wheezing  This is a chronic problem  The problem has been waxing and waning  Associated symptoms include heartburn  Pertinent negatives include no chest pain or fever  His symptoms are aggravated by URI and exposure to smoke  His symptoms are alleviated by beta-agonist and steroid inhaler  He reports moderate improvement on treatment  His past medical history is significant for asthma  Nicotine Dependence   Presents for follow-up visit  Symptoms are negative for fatigue  His urge triggers include company of smokers  The symptoms have been stable  Compliance with prior treatments has been variable         The following portions of the patient's history were reviewed and updated as appropriate: allergies, current medications, past family history, past medical history, past social history, past surgical history and problem list     Review of Systems   Constitutional: Negative for chills, fatigue and fever  Respiratory: Negative for cough, shortness of breath and wheezing  Cardiovascular: Negative for chest pain and palpitations  Gastrointestinal: Positive for heartburn  Negative for abdominal pain, blood in stool, constipation, diarrhea, melena, nausea and vomiting  Scheduled for upper endoscopy with GI   GERD sx stable on medications  No tarry colored stools   Musculoskeletal:        Pain controlled at this time  Follows with provider who prescribes medicinal marijuana   Neurological: Negative for dizziness and light-headedness  Psychiatric/Behavioral: Negative for agitation  Following with psych  See HPI  Volunteers at [employer]  Has ICM worker           Patient Active Problem List   Diagnosis    Prepatellar bursitis of right knee    Tobacco abuse    GERD without esophagitis    Incomplete tear of right rotator cuff    Low back pain    Neck pain    Nicotine dependence    S/P cervical spinal fusion    Vitamin D deficiency    Hypertriglyceridemia    HNP (herniated nucleus pulposus) with myelopathy, cervical    Dyslipidemia    Cervical radiculopathy    Bipolar disorder, mixed ([facility] [address] )    Asthma    Screening for colon cancer    Abnormal thyroid function test    Chronic right-sided low back pain without sciatica    Palpitation    Marijuana smoker, episodic    Chronic pain    Iron deficiency anemia    Non-healing skin lesion    Acanthosis nigricans    Scabies       Past Medical History:   Diagnosis Date    Anemia     Anxiety     Arthritis     Asthma     Dermatitis of foot     Last Assessed 1/18/2016    Edentulous     Fracture of fibula     Distal; Last Assessed 7/11/2014    GERD (gastroesophageal reflux disease)     GERD (gastroesophageal reflux disease)     History of fusion of cervical spine     Hyperlipidemia     Low back pain     states on prednisone for a  few days for back    Marijuana smoker, episodic 2/17/2016    Moderate persistent asthma     Last Assessed 7/3/2014    Neurosis, anxiety, panic type     Resolved 8/20/2015    Prepatellar bursitis, right knee     Smoker     Tinnitus     Tobacco abuse     Last Assessed 1/18/2016    Wears glasses     reading       Past Surgical History:   Procedure Laterality Date    CERVICAL FUSION      NECK SURGERY      Last Assessed 5/02/2017    [place] REMOVAL PREPATELLA BURSA Right 3/8/2018    Procedure: EXCISION BURSA PREPATELLAR;  Surgeon: Harvinder Phelps MD;  [facility];  Service: Orthopedics    [place] [address] ARTHROSCOP,SURG,W/ROTAT CUFF REPR Right 7/24/2017    Procedure: ARTHROSCOPY SHOULDER, ROTATOR CUFF REPAIR,OPEN BICEP TENODESIS;  Surgeon: Harvinder Phelps MD;  [facility];  Service: Orthopedics    TOOTH EXTRACTION           Current Outpatient Medications:     albuterol (VENTOLIN HFA) 90 mcg/act inhaler, Inhale 1-2 puffs every 4 (four) hours as needed for wheezing or shortness of breath, Disp: 1 Inhaler, Rfl: 5    atorvastatin (LIPITOR) 20 mg tablet, Take 1 tablet (20 mg total) by mouth daily, Disp: 90 tablet, Rfl: 1    DULoxetine (CYMBALTA) 60 mg delayed release capsule, Take 60 mg by mouth every morning, Disp: , Rfl: 0    ergocalciferol (VITAMIN D2) 50,000 units, Take 1 capsule (50,000 Units total) by mouth once a week, Disp: 4 capsule, Rfl: 3    ferrous sulfate 325 (65 Fe) mg tablet, Take 1 tablet (325 mg total) by mouth daily with breakfast for 180 days, Disp: 90 tablet, Rfl: 1    fluticasone-salmeterol (ADVAIR) 100-50 mcg/dose inhaler, Inhale 1 puff 2 (two) times a day Rinse mouth after use , Disp: 1 Inhaler, Rfl: 5    hydrOXYzine HCL (ATARAX) 50 mg tablet, Take 50 mg by mouth 3 (three) times a day , Disp: , Rfl: 0    omeprazole (PriLOSEC) 40 MG capsule, Take 1 capsule (40 mg total) by mouth daily, Disp: 90 capsule, Rfl: 0    QUEtiapine (SEROquel) 100 mg tablet, Take 100 mg by mouth 2 (two) times a day, Disp: , Rfl:     No Known Allergies    Social History     Socioeconomic History    Marital status: Single     Spouse name: None    Number of children: None    Years of education: None    Highest education level: None   Occupational History    None   Social Needs    Financial resource strain: None    Food insecurity:     Worry: None     Inability: None    Transportation needs:     Medical: None     Non-medical: None   Tobacco Use    Smoking status: Current Every Day Smoker     Packs/day: 0 50     Years: 10 00     Pack years: 5 00     Types: Cigarettes    Smokeless tobacco: Never Used    Tobacco comment: smoking for 20 years   Substance and Sexual Activity    Alcohol use:  Yes     Alcohol/week: 1 0 standard drinks     Types: 1 Cans of beer per week     Frequency: 2-4 times a month     Drinks per session: 1 or 2     Binge frequency: Never     Comment: socially    Drug use: Yes     Types: Marijuana     Comment: smokes daily, last smoked 3/7/18 ;Per allscript No drug  use    Sexual activity: None   Lifestyle    Physical activity:     Days per week: None     Minutes per session: None    Stress: None   Relationships    Social connections:     Talks on phone: None     Gets together: None     Attends Shinto service: None     Active member of club or organization: None     Attends meetings of clubs or organizations: None     Relationship status: None    Intimate partner violence:     Fear of current or ex partner: None     Emotionally abused: None     Physically abused: None     Forced sexual activity: None   Other Topics Concern    None   Social History Narrative    None       Family History   Problem Relation Age of Onset    Cancer Mother         ovarian    No Known Problems Father     Diabetes type II Maternal Grandfather        PHQ-9 Depression Screening    PHQ-9:    Frequency of the following problems over the past two weeks:              Health Maintenance   Topic Date Due    CRC Screening: FOBTx3/FIT  08/02/2019    INFLUENZA VACCINE  09/24/2019 (Originally 7/1/2019)    BMI: Adult  08/30/2020    DTaP,Tdap,and Td Vaccines (2 - Td) 02/07/2027    CRC Screening: Colonoscopy  08/04/2027    Pneumococcal Vaccine: 65+ Years (1 of 2 - PCV13) 06/28/2032    Pneumococcal Vaccine: Pediatrics (0 to 5 Years) and At-Risk Patients (6 to 59 Years)  Completed    HEPATITIS B VACCINES  Aged Out       Immunization History   Administered Date(s) Administered    INFLUENZA 11/16/2018    Influenza Quadrivalent, 6-35 Months IM 10/20/2016    Influenza TIV (IM) 10/18/2015    Influenza, seasonal, injectable, preservative free 11/16/2018    Pneumococcal Polysaccharide PPV23 06/13/2019    Td (adult), adsorbed 09/27/2013    Tdap 02/07/2017    Zoster Vaccine Recombinant 06/13/2019        Objective:  Vitals:    08/30/19 0857   BP: 104/78   BP Location: Left arm   Patient Position: Sitting   Cuff Size: Adult   Pulse: 84   Temp: 97 7 °F (36 5 °C)   TempSrc: Oral   SpO2: 98%   Weight: 64 kg (141 lb)   Height: 5' 3" (1 6 m)     Wt Readings from Last 3 Encounters:   08/30/19 64 kg (141 lb)   06/14/19 64 8 kg (142 lb 12 8 oz)   01/25/19 70 9 kg (156 lb 6 1 oz)     Body mass index is 24 98 kg/m²  No exam data present       Physical Exam   Constitutional: He appears well-developed and well-nourished  No distress  Alert pleasant cooperative  Seated in room in no acute distress  Respectful  Good eye contact   HENT:   Head: Normocephalic and atraumatic  Mouth/Throat: Oropharynx is clear and moist  No oropharyngeal exudate  Moist mucous membranes  Normal posterior pharynx   Eyes: Pupils are equal, round, and reactive to light  Right eye exhibits no discharge  Left eye exhibits no discharge  No scleral icterus  Neck: Neck supple     Cardiovascular: Normal rate, regular rhythm and normal heart sounds  No murmur heard  Regular rate and rhythm  No audible murmurs   Pulmonary/Chest: Effort normal and breath sounds normal  No respiratory distress  He has no wheezes  He has no rales  Scattered end-expiratory wheeze  No crackles no rhonchi  No respiratory distress   Abdominal: Soft  Bowel sounds are normal  He exhibits no distension  There is no tenderness  There is no guarding  Positive bowel sounds  Soft nontender nondistended   Musculoskeletal: He exhibits no edema  Neurological: He is alert  Skin: Skin is warm and dry  He is not diaphoretic  Psychiatric: He has a normal mood and affect  His behavior is normal  Thought content normal  His mood appears not anxious  His speech is not rapid and/or pressured and not slurred  He is not agitated and not aggressive  He does not exhibit a depressed mood  He expresses no homicidal and no suicidal ideation  Nursing note and vitals reviewed            Future Appointments   Date Time Provider Lorena Mallory   10/25/2019 10:30 AM Cuca Pulliam PA-C [address] [place] [address]   10/29/2019  8:45 AM [facility] GI ROOM 01 [facility] Endo None       Cuca Pulliam PA-C   [address]    Patient Care Team:  Cherry Greene MD as PCP - General (Internal Medicine)  DO Bernardo Ferro MD

## 2019-10-07 DIAGNOSIS — M70.41 PREPATELLAR BURSITIS OF RIGHT KNEE: ICD-10-CM

## 2019-10-07 RX ORDER — ALBUTEROL SULFATE 90 UG/1
1-2 AEROSOL, METERED RESPIRATORY (INHALATION) EVERY 4 HOURS PRN
Qty: 1 INHALER | Refills: 5 | Status: SHIPPED | OUTPATIENT
Start: 2019-10-07

## 2019-10-07 NOTE — TELEPHONE ENCOUNTER
MED: Albuterol 90mcg   SUPPLY: 90Day  PHARMACY: [address]  PATIENT PHONE #: [phone]  LAST OV: 8/30/2019  UPCOMING OV: 10/25/2019

## 2019-10-29 ENCOUNTER — HOSPITAL ENCOUNTER (OUTPATIENT)
Dept: GASTROENTEROLOGY | Facility: MEDICAL CENTER | Age: 54
Setting detail: OUTPATIENT SURGERY
Discharge: HOME | End: 2019-10-29
Attending: INTERNAL MEDICINE

## 2019-11-06 ENCOUNTER — TELEPHONE (OUTPATIENT)
Dept: GASTROENTEROLOGY | Facility: AMBULARY SURGERY CENTER | Age: 54
End: 2019-11-06

## 2019-11-06 ENCOUNTER — TELEPHONE (OUTPATIENT)
Dept: GASTROENTEROLOGY | Facility: MEDICAL CENTER | Age: 54
End: 2019-11-06

## 2019-11-06 NOTE — TELEPHONE ENCOUNTER
Patient never called back, if patient would like to rs his procedure, he must speak with a Practice Administrator first     ----- Message from Kirsty Vazquez sent at 10/29/2019  4:09 PM EDT -----  Regarding: RE: high no show rate  Called pt left msg  ----- Message -----  From: Radha Roberts MD  Sent: 10/29/2019   3:47 PM EDT  To: Kirsty Vazuqez  Subject: RE: high no show rate                            Yes if you could check to see what is going on that would be appreciated  Tell him we may not be able to reschedule him if he canceled again  ----- Message -----  From: Kirsty Vazquez  Sent: 10/29/2019  11:29 AM EDT  To: Radha Roberts MD  Subject: high no show rate                                This patient was a no show again today, do you want us to contact him again?

## 2019-11-07 ENCOUNTER — TELEPHONE (OUTPATIENT)
Dept: GASTROENTEROLOGY | Facility: MEDICAL CENTER | Age: 54
End: 2019-11-07

## 2019-11-07 NOTE — TELEPHONE ENCOUNTER
Vm for Case Manger to call me back, regarding numerous cancelled procedures    Abdon Hung called back, please schedule all procedures/pffice appts with her-Adolph has memory issues    [phone]    Explained that  [address] [facility] will call back to rs procedure with Abdon Hung only

## 2019-11-13 ENCOUNTER — TELEPHONE (OUTPATIENT)
Dept: INTERNAL MEDICINE CLINIC | Facility: CLINIC | Age: 54
End: 2019-11-13

## 2019-11-13 NOTE — TELEPHONE ENCOUNTER
Per Taz Hammer last note in Aug 2019, pt was to get bw done for Vit D and then depending on results the vit D may be reduced  Spoke to pt  He will coem in to [facility] office to get bw done    HE is aware that we will have to wait on the results before refilling Vit D

## 2019-11-13 NOTE — TELEPHONE ENCOUNTER
Needs refill on Vitamin D sent to [facility] on [address] in [facility]    Has upcoming appointment on 11/22/19

## 2019-11-18 ENCOUNTER — CLINICAL SUPPORT (OUTPATIENT)
Dept: INTERNAL MEDICINE CLINIC | Facility: CLINIC | Age: 54
End: 2019-11-18
Payer: COMMERCIAL

## 2019-11-18 DIAGNOSIS — K21.9 GERD WITHOUT ESOPHAGITIS: ICD-10-CM

## 2019-11-18 DIAGNOSIS — D50.9 IRON DEFICIENCY ANEMIA, UNSPECIFIED IRON DEFICIENCY ANEMIA TYPE: ICD-10-CM

## 2019-11-18 DIAGNOSIS — E55.9 VITAMIN D DEFICIENCY: ICD-10-CM

## 2019-11-18 DIAGNOSIS — Z72.0 TOBACCO ABUSE: ICD-10-CM

## 2019-11-18 DIAGNOSIS — E78.5 DYSLIPIDEMIA: Primary | ICD-10-CM

## 2019-11-18 PROCEDURE — 36415 COLL VENOUS BLD VENIPUNCTURE: CPT

## 2019-11-21 ENCOUNTER — PREP FOR PROCEDURE (OUTPATIENT)
Dept: GASTROENTEROLOGY | Facility: MEDICAL CENTER | Age: 54
End: 2019-11-21

## 2019-11-21 ENCOUNTER — TELEPHONE (OUTPATIENT)
Dept: GASTROENTEROLOGY | Facility: MEDICAL CENTER | Age: 54
End: 2019-11-21

## 2019-11-21 DIAGNOSIS — K21.9 GERD WITHOUT ESOPHAGITIS: Primary | ICD-10-CM

## 2019-11-21 NOTE — TELEPHONE ENCOUNTER
Called and spoke with pt care haily Mcmanus pt is scheduled for egd with Dr Ara Benton at [facility] on 1/27/19  Patient is aware she will need a  to and from   She will get a call the day before with an exact time for arrival

## 2019-11-22 ENCOUNTER — TELEPHONE (OUTPATIENT)
Dept: INTERNAL MEDICINE CLINIC | Facility: CLINIC | Age: 54
End: 2019-11-22

## 2019-11-22 ENCOUNTER — DOCUMENTATION (OUTPATIENT)
Dept: INTERNAL MEDICINE CLINIC | Facility: CLINIC | Age: 54
End: 2019-11-22

## 2019-11-22 LAB
25(OH)D3 SERPL-MCNC: 32 NG/ML (ref 30–100)
ALBUMIN SERPL-MCNC: 4.3 G/DL (ref 3.6–5.1)
ALBUMIN/GLOB SERPL: 2.2 (CALC) (ref 1–2.5)
ALP SERPL-CCNC: 112 U/L (ref 40–115)
ALT SERPL-CCNC: 23 U/L (ref 9–46)
AST SERPL-CCNC: 27 U/L (ref 10–35)
BASOPHILS # BLD AUTO: 54 CELLS/UL (ref 0–200)
BASOPHILS NFR BLD AUTO: 0.7 %
BILIRUB SERPL-MCNC: 0.3 MG/DL (ref 0.2–1.2)
BUN SERPL-MCNC: 8 MG/DL (ref 7–25)
BUN/CREAT SERPL: NORMAL (CALC) (ref 6–22)
CALCIUM SERPL-MCNC: 9.6 MG/DL (ref 8.6–10.3)
CHLORIDE SERPL-SCNC: 102 MMOL/L (ref 98–110)
CHOLEST SERPL-MCNC: 173 MG/DL
CHOLEST/HDLC SERPL: 3.8 (CALC)
CO2 SERPL-SCNC: 25 MMOL/L (ref 20–32)
CREAT SERPL-MCNC: 0.79 MG/DL (ref 0.7–1.33)
EOSINOPHIL # BLD AUTO: 193 CELLS/UL (ref 15–500)
EOSINOPHIL NFR BLD AUTO: 2.5 %
ERYTHROCYTE [DISTWIDTH] IN BLOOD BY AUTOMATED COUNT: 13.4 % (ref 11–15)
GLOBULIN SER CALC-MCNC: 2 G/DL (CALC) (ref 1.9–3.7)
GLUCOSE SERPL-MCNC: 85 MG/DL (ref 65–99)
HCT VFR BLD AUTO: 44.9 % (ref 38.5–50)
HDLC SERPL-MCNC: 46 MG/DL
HGB BLD-MCNC: 14.9 G/DL (ref 13.2–17.1)
LDLC SERPL CALC-MCNC: 92 MG/DL (CALC)
LYMPHOCYTES # BLD AUTO: 1756 CELLS/UL (ref 850–3900)
LYMPHOCYTES NFR BLD AUTO: 22.8 %
MCH RBC QN AUTO: 28.7 PG (ref 27–33)
MCHC RBC AUTO-ENTMCNC: 33.2 G/DL (ref 32–36)
MCV RBC AUTO: 86.3 FL (ref 80–100)
MONOCYTES # BLD AUTO: 824 CELLS/UL (ref 200–950)
MONOCYTES NFR BLD AUTO: 10.7 %
NEUTROPHILS # BLD AUTO: 4874 CELLS/UL (ref 1500–7800)
NEUTROPHILS NFR BLD AUTO: 63.3 %
NONHDLC SERPL-MCNC: 127 MG/DL (CALC)
PLATELET # BLD AUTO: 263 THOUSAND/UL (ref 140–400)
PMV BLD REES-ECKER: 9.7 FL (ref 7.5–12.5)
POTASSIUM SERPL-SCNC: 4.6 MMOL/L (ref 3.5–5.3)
PROT SERPL-MCNC: 6.3 G/DL (ref 6.1–8.1)
RBC # BLD AUTO: 5.2 MILLION/UL (ref 4.2–5.8)
SL AMB EGFR AFRICAN AMERICAN: 120 ML/MIN/1.73M2
SL AMB EGFR NON AFRICAN AMERICAN: 103 ML/MIN/1.73M2
SODIUM SERPL-SCNC: 139 MMOL/L (ref 135–146)
TRIGL SERPL-MCNC: 264 MG/DL
WBC # BLD AUTO: 7.7 THOUSAND/UL (ref 3.8–10.8)

## 2019-11-22 NOTE — TELEPHONE ENCOUNTER
Patient here today for follow-up  He had his labs drawn last week prior to today's visit  He is upset that our office was running late this morning  He takes a bus to and from our office  His lab work was drawn with Patrick Jones office nursing staff is trying to get copies of patient's most recent labs  Patient notes he is unwilling to wait  He notes that he is claustrophobic and does not like to sit and wait in office  He informed me he will no longer be following with our practice  I apologized that we were running late  I suggested he schedule appointments for first thing in the morning or first thing after lunch to avoid the risk of us running behind schedule  He verbalized understanding but is not interested in scheduling at this time  I advised him to let our office know if he changes PCP so that his records can be transferred  I discussed importance of him following with PCP for chronic conditions

## 2019-11-22 NOTE — PROGRESS NOTES
Patient here today for follow-up  He had his labs drawn last week prior to today's visit  He is upset that our office was running late this morning  He takes a bus to and from our office  His lab work was drawn with Nelsy Ferrer office nursing staff is trying to get copies of patient's most recent labs  Patient notes he is unwilling to wait  He notes that he is claustrophobic and does not like to sit and wait in office  He informed me he will no longer be following with our practice  I recommended he schedule appointment 1st thing in the morning or 1st thing after lunch to avoid the risk of our office being behind schedule  I discussed the importance of him following with a PCP for chronic care management  Patient verbalized understanding but is not interested in scheduling at this time  I discussed importance of him reporting to our office if he does decide to change PCP as his records will have to be transferred  Patient revised understanding and left office without being seen

## 2019-11-26 ENCOUNTER — ANESTHESIA EVENT (OUTPATIENT)
Dept: GASTROENTEROLOGY | Facility: MEDICAL CENTER | Age: 54
End: 2019-11-26

## 2019-11-27 ENCOUNTER — HOSPITAL ENCOUNTER (OUTPATIENT)
Dept: GASTROENTEROLOGY | Facility: MEDICAL CENTER | Age: 54
Setting detail: OUTPATIENT SURGERY
Discharge: HOME | End: 2019-11-27
Attending: INTERNAL MEDICINE | Admitting: INTERNAL MEDICINE
Payer: COMMERCIAL

## 2019-11-27 ENCOUNTER — ANESTHESIA (OUTPATIENT)
Dept: GASTROENTEROLOGY | Facility: MEDICAL CENTER | Age: 54
End: 2019-11-27

## 2019-11-27 VITALS
TEMPERATURE: 98.3 F | HEIGHT: 63 IN | RESPIRATION RATE: 20 BRPM | BODY MASS INDEX: 24.8 KG/M2 | WEIGHT: 140 LBS | DIASTOLIC BLOOD PRESSURE: 67 MMHG | SYSTOLIC BLOOD PRESSURE: 105 MMHG | OXYGEN SATURATION: 91 % | HEART RATE: 86 BPM

## 2019-11-27 DIAGNOSIS — K21.9 GERD WITHOUT ESOPHAGITIS: ICD-10-CM

## 2019-11-27 PROCEDURE — 43239 EGD BIOPSY SINGLE/MULTIPLE: CPT | Performed by: INTERNAL MEDICINE

## 2019-11-27 PROCEDURE — 88342 IMHCHEM/IMCYTCHM 1ST ANTB: CPT | Performed by: PATHOLOGY

## 2019-11-27 PROCEDURE — 88305 TISSUE EXAM BY PATHOLOGIST: CPT | Performed by: PATHOLOGY

## 2019-11-27 RX ORDER — SODIUM CHLORIDE 9 MG/ML
125 INJECTION, SOLUTION INTRAVENOUS CONTINUOUS
Status: DISCONTINUED | OUTPATIENT
Start: 2019-11-27 | End: 2019-12-01 | Stop reason: HOSPADM

## 2019-11-27 RX ORDER — ALBUTEROL SULFATE 2.5 MG/3ML
2.5 SOLUTION RESPIRATORY (INHALATION) ONCE
Status: COMPLETED | OUTPATIENT
Start: 2019-11-27 | End: 2019-11-27

## 2019-11-27 RX ORDER — PROPOFOL 10 MG/ML
INJECTION, EMULSION INTRAVENOUS AS NEEDED
Status: DISCONTINUED | OUTPATIENT
Start: 2019-11-27 | End: 2019-11-27 | Stop reason: SURG

## 2019-11-27 RX ADMIN — PROPOFOL 50 MG: 10 INJECTION, EMULSION INTRAVENOUS at 12:37

## 2019-11-27 RX ADMIN — ALBUTEROL SULFATE 2.5 MG: 2.5 SOLUTION RESPIRATORY (INHALATION) at 12:02

## 2019-11-27 RX ADMIN — PROPOFOL 50 MG: 10 INJECTION, EMULSION INTRAVENOUS at 12:39

## 2019-11-27 RX ADMIN — PROPOFOL 150 MG: 10 INJECTION, EMULSION INTRAVENOUS at 12:33

## 2019-11-27 RX ADMIN — PROPOFOL 50 MG: 10 INJECTION, EMULSION INTRAVENOUS at 12:35

## 2019-11-27 RX ADMIN — SODIUM CHLORIDE 125 ML/HR: 0.9 INJECTION, SOLUTION INTRAVENOUS at 11:57

## 2019-11-27 RX ADMIN — PROPOFOL 50 MG: 10 INJECTION, EMULSION INTRAVENOUS at 12:36

## 2019-11-27 NOTE — H&P
History and Physical - [facility]  Selina Marshall 48 y o  male MRN: [id number]                  HPI: Selina Marshall is a 48y o  year old male who presents for EGD evaluation for GERD  REVIEW OF SYSTEMS: Per the HPI, and otherwise unremarkable      Historical Information   Past Medical History:   Diagnosis Date    Anemia     Anxiety     Arthritis     Asthma     Dermatitis of foot     Last Assessed 1/18/2016    Edentulous     Fracture of fibula     Distal; Last Assessed 7/11/2014    GERD (gastroesophageal reflux disease)     GERD (gastroesophageal reflux disease)     History of fusion of cervical spine     Hyperlipidemia     Low back pain     states on prednisone for a  few days for back    Marijuana smoker, episodic 2/17/2016    Moderate persistent asthma     Last Assessed 7/3/2014    Neurosis, anxiety, panic type     Resolved 8/20/2015    Prepatellar bursitis, right knee     Smoker     Tinnitus     Tobacco abuse     Last Assessed 1/18/2016    Wears glasses     reading     Past Surgical History:   Procedure Laterality Date    CERVICAL FUSION      NECK SURGERY      Last Assessed 5/02/2017    [place] REMOVAL PREPATELLA BURSA Right 3/8/2018    Procedure: EXCISION BURSA PREPATELLAR;  Surgeon: Ibrahima Thompson MD;  [facility];  Service: Orthopedics    [place] [address] ARTHROSCOP,SURG,W/ROTAT CUFF REPR Right 7/24/2017    Procedure: ARTHROSCOPY SHOULDER, ROTATOR CUFF REPAIR,OPEN BICEP TENODESIS;  Surgeon: Ibrahima Thompson MD;  [facility];  Service: Orthopedics    TOOTH EXTRACTION       Social History   Social History     Substance and Sexual Activity   Alcohol Use Yes    Alcohol/week: 1 0 standard drinks    Types: 1 Cans of beer per week    Frequency: 2-4 times a month    Drinks per session: 1 or 2    Binge frequency: Never    Comment: socially     Social History     Substance and Sexual Activity   Drug Use Yes    Types: Marijuana    Comment: smokes daily, last smoked 3/7/18 ;Per allscript No drug  use     Social History     Tobacco Use   Smoking Status Current Every Day Smoker    Packs/day: 0 50    Years: 10 00    Pack years: 5 00    Types: Cigarettes   Smokeless Tobacco Never Used   Tobacco Comment    smoking for 21 years     Family History   Problem Relation Age of Onset   Siva Bones Cancer Mother         ovarian    No Known Problems Father     Diabetes type II Maternal Grandfather        Meds/Allergies       (Not in a hospital admission)    No Known Allergies    Objective     /83   Pulse 82   Temp 98 3 °F (36 8 °C) (Temporal)   Resp 16   Ht 5' 3" (1 6 m)   Wt 63 5 kg (140 lb)   SpO2 95%   BMI 24 80 kg/m²       PHYSICAL EXAM    Gen: NAD  CV: RRR  CHEST: Clear  ABD: soft, NT/ND  EXT: no edema      ASSESSMENT/PLAN:  This is a 48y o  year old male here for EGD, and he is stable and optimized for his procedure

## 2019-11-27 NOTE — DISCHARGE INSTRUCTIONS
Upper Endoscopy   WHAT YOU NEED TO KNOW:   An upper endoscopy is also called an upper gastrointestinal (GI) endoscopy, or an esophagogastroduodenoscopy (EGD)  You may feel bloated, gassy, or have some abdominal discomfort after your procedure  Your throat may be sore for 24 to 36 hours  You may burp or pass gas from air that is still inside your body  DISCHARGE INSTRUCTIONS:   Call 911 if:   · You have sudden chest pain or trouble breathing  Seek care immediately if:   · You feel dizzy or faint  · You have trouble swallowing  · You have severe throat pain  · Your bowel movements are very dark or black  · Your abdomen is hard and firm and you have severe pain  · You vomit blood  Contact your healthcare provider if:   · You feel full or bloated and cannot burp or pass gas  · You have not had a bowel movement for 3 days after your procedure  · You have neck pain  · You have a fever or chills  · You have nausea or are vomiting  · You have a rash or hives  · You have questions or concerns about your endoscopy  Relieve a sore throat:  Suck on throat lozenges or crushed ice  Gargle with a small amount of warm salt water  Mix 1 teaspoon of salt and 1 cup of warm water to make salt water  Relieve gas and discomfort from bloating:  Lie on your right side with a heating pad on your abdomen  Take short walks to help pass gas  Eat small meals until bloating is relieved  Rest after your procedure:  Do not drive or make important decisions until the day after your procedure  Return to your normal activity as directed  You can usually return to work the day after your procedure  Follow up with your healthcare provider as directed:  Write down your questions so you remember to ask them during your visits  © 2017 [employer]  Information is for End User's use only and may not be sold, redistributed or otherwise used for commercial purposes   All illustrations and images included in [employer] are the copyrighted property of A D A M [employer]  or Will Hinson  The above information is an  only  It is not intended as medical advice for individual conditions or treatments  Talk to your doctor, nurse or pharmacist before following any medical regimen to see if it is safe and effective for you  Hiatal Hernia   WHAT YOU NEED TO KNOW:   What is a hiatal hernia? A hiatal hernia is a condition that causes part of your stomach to bulge through the hiatus (small opening) in your diaphragm  The part of the stomach may move up and down, or it may get trapped above the diaphragm  What increases my risk for a hiatal hernia? The exact cause of a hiatal hernia is not known  You may have been born with a large hiatus  The following may increase your risk of a hiatal hernia:  · Obesity    · Older age    · Medical conditions such as diverticulosis or esophagitis    · Previous surgery of the esophagus or stomach or trauma such as from a motor vehicle accident  What are the types of hiatal hernia? · Type I (sliding hiatal hernia): A portion of the stomach slides in and out of the hiatus  This type is the most common and usually causes gastroesophageal reflux disease (GERD)  GERD occurs when the esophageal sphincter does not close properly and causes acid reflux  The esophageal sphincter is the lower muscle of the esophagus  · Type II (paraesophageal hiatal hernia):  Type II hiatal hernia forms when a part of the stomach squeezes through the hiatus and lies next to the esophagus  · Type III (combined):  Type III hiatal hernia is a combination of a sliding and a paraesophageal hiatal hernia  · Type IV (complex paraesophageal hiatal hernia): The whole stomach, the small and large bowels, spleen, pancreas, or liver is pushed up into the chest   What are the signs and symptoms of a hiatal hernia? The most common symptom is heartburn   This usually occurs after meals and spreads to your neck, jaw, or shoulder  You may have no signs or symptoms, or you may have any of the following:  · Abdominal pain, especially in the area just above your navel    · Bitter or acid taste in your mouth    · Trouble swallowing    · Coughing or hoarseness    · Chest pain or shortness of breath that occurs after eating    · Frequent burping or hiccups    · Uncomfortable feeling of fullness after eating  How is a hiatal hernia diagnosed? · An upper GI series test  includes x-rays of your esophagus, stomach, and your small intestines  It is also called a barium swallow test  You will be given barium (a chalky liquid) to drink before the pictures are taken  This liquid helps your stomach and intestines show up better on the x-rays  An upper GI series can show if you have an ulcer, a blocked intestine, or other problems  · An endoscopy  uses a scope to see the inside of your digestive tract  A scope is a long, bendable tube with a light on the end of it  A camera may be hooked to the scope to take pictures  How is a hiatal hernia treated? Treatment depends on the type of hiatal hernia you have and on your symptoms  You may not need any treatment  You may need any of the following:  · Medicines  may be given to relieve heartburn symptoms  These medicines help to decrease or block stomach acid  You may also be given medicines that help to tighten the esophageal sphincter  · Surgery  may be done when medicines cannot control your symptoms, or other problems are present  Your healthcare provider may also suggest surgery depending on the type of hernia you have  Your healthcare provider can put your stomach back into its normal location  He may make the hiatus (hole) smaller and anchor your stomach in your abdomen  Fundoplication is a surgery that wraps the upper part of the stomach around the esophageal sphincter to strengthen it  How can I manage symptoms?   The following nutrition and lifestyle changes may be recommended to relieve symptoms of heartburn  · Avoid foods that make your symptoms worse  These may include spicy foods, fruit juices, alcohol, caffeine, chocolate, and mint  · Eat several small meals during the day  Small meals give your stomach less food to digest     · Avoid lying down and bending forward after you eat  Do not eat meals 2 to 3 hours before bedtime  This decreases your risk for reflux  · Maintain a healthy weight  If you are overweight, weight loss may help relieve your symptoms  · Sleep with your head elevated  at least 6 inches  · Do not smoke  Smoking can increase your symptoms of heartburn  When should I seek immediate care? · You have severe abdominal pain  · You try to vomit but nothing comes out (retching)  · You have severe chest pain and sudden trouble breathing  · Your bowel movements are black or bloody  · Your vomit looks like coffee grounds or has blood in it  When should I contact my healthcare provider? · Your symptoms are getting worse  · You have nausea, and you are vomiting  · You are losing weight without trying  · You have questions or concerns about your condition or care  CARE AGREEMENT:   You have the right to help plan your care  Learn about your health condition and how it may be treated  Discuss treatment options with your caregivers to decide what care you want to receive  You always have the right to refuse treatment  The above information is an  only  It is not intended as medical advice for individual conditions or treatments  Talk to your doctor, nurse or pharmacist before following any medical regimen to see if it is safe and effective for you  © 2017 [facility] Information is for End User's use only and may not be sold, redistributed or otherwise used for commercial purposes   All illustrations and images included in CareNotes® are the copyrighted property of A  D A M , Inc  or Will Hinson

## 2019-11-27 NOTE — ANESTHESIA PREPROCEDURE EVALUATION
Review of Systems/Medical History  Patient summary reviewed  Chart reviewed  No history of anesthetic complications     Cardiovascular  Negative cardio ROS Hyperlipidemia,    Pulmonary  Smoker cigarette smoker  , Asthma (wheezes with smoking) , well controlled/ stable ,        GI/Hepatic    GERD ,        Negative  ROS        Endo/Other  Negative endo/other ROS      GYN       Hematology  Negative hematology ROS      Musculoskeletal  Back pain (fusion) , cervical pain,   Arthritis     Neurology  Negative neurology ROS      Psychology   Negative psychology ROS Anxiety,              Physical Exam    Airway    Mallampati score: II  TM Distance: >3 FB  Neck ROM: limited     Dental   Comment: No teeth,     Cardiovascular  Comment: Negative ROS, Rhythm: regular, Rate: normal,     Pulmonary  Rhonchi (R chest Cleared with cough), Decreased breath sounds,     Other Findings        Anesthesia Plan  ASA Score- 2     Anesthesia Type- IV sedation with anesthesia with ASA Monitors  Additional Monitors:   Airway Plan:         Plan Factors- Patient instructed to abstain from smoking on day of procedure  Patient smoked on day of surgery  Induction- intravenous  Postoperative Plan-     Informed Consent- Anesthetic plan and risks discussed with patient

## 2019-12-02 ENCOUNTER — TELEPHONE (OUTPATIENT)
Dept: INTERNAL MEDICINE CLINIC | Facility: CLINIC | Age: 54
End: 2019-12-02

## 2019-12-02 NOTE — TELEPHONE ENCOUNTER
At the end of a result call, Gallo wanted to talk about his previous request for Meloxicam   His prior history of back surgery is the cause of his pain  He is not sure whey this medication is no longer on his active medication list   He is also asking when his appt is scheduled with his new PCP  He said that he spoke with the  re: these two issues    I will refer the call to [id number]

## 2019-12-05 NOTE — TELEPHONE ENCOUNTER
I spoke to this patient and informed him of Orlando's decision on the medication and I also explained to him when I spoke to him 2 weeks ago that I gave him 3 PCP phone numbers to call to make an appt with  He explained to me that he misplaced the paper with the phone numbers  So I gave him the phone numbers again

## 2019-12-06 NOTE — RESULT ENCOUNTER NOTE
Please see that patient has follow up with PCP regarding his test results  Cholesterol is elevated and will need follow up  Thanks    Erika Anderson

## 2020-01-15 ENCOUNTER — TELEPHONE (OUTPATIENT)
Dept: INTERNAL MEDICINE CLINIC | Facility: CLINIC | Age: 55
End: 2020-01-15

## 2020-01-15 DIAGNOSIS — E78.5 HYPERLIPIDEMIA, UNSPECIFIED HYPERLIPIDEMIA TYPE: ICD-10-CM

## 2020-01-15 NOTE — TELEPHONE ENCOUNTER
Spoke to Jona, pharmacist at [employer] and he said he would call the insurance and handle this issue because "they do it all the time "

## 2020-01-15 NOTE — TELEPHONE ENCOUNTER
Chandana Hernandez from [facility] called a prior Robb [place] needs to be submitted from us for his atorvastatin since pt lost his 90 day prescription in order for them to refill it it needs a prior auth  [place] states for lost meds they need auth done  They said about a drug exception form

## 2020-01-31 NOTE — TELEPHONE ENCOUNTER
[facility] is calling stating that they called [facility] four different times just to see what they would say and each time they stated that the medication was not approved  The pharmacist said the only other way to get around this issue to hold the patient over for the next two weeks would be to send in for 10 mg tablets and have him take two at a time to make the 20 mg  He stated that if we submit it this way for the next two weeks the patient would at least be able to get his medication until the 20 mg tabs are ready to be refilled in two weeks  The phone number for [facility] is [phone] if there are any questions

## 2020-01-31 NOTE — TELEPHONE ENCOUNTER
Pt called the office again and wanted to know why he cannot get his Lipitor filled yet  I called [facility] and spoke to Mykel, and stated that we called [facility] and got an approval of this refill since the patient lost his prescription and call [facility] and stated that [facility] gave the approval to refill this medication because it was medically necessary  Mykel stated that someone dropped the ball on this and I stated that he will have to fix this issue since [facility] dropped the ball on this and get this patient his medication which is needed

## 2020-02-03 RX ORDER — ATORVASTATIN CALCIUM 10 MG/1
20 TABLET, FILM COATED ORAL DAILY
Qty: 28 TABLET | Refills: 0 | Status: SHIPPED | OUTPATIENT
Start: 2020-02-03

## 2023-08-16 ENCOUNTER — HOSPITAL ENCOUNTER (EMERGENCY)
Facility: HOSPITAL | Age: 58
Discharge: HOME | End: 2023-08-16
Attending: EMERGENCY MEDICINE
Payer: MEDICARE

## 2023-08-16 VITALS
RESPIRATION RATE: 17 BRPM | OXYGEN SATURATION: 96 % | SYSTOLIC BLOOD PRESSURE: 141 MMHG | HEART RATE: 89 BPM | TEMPERATURE: 98.4 F | DIASTOLIC BLOOD PRESSURE: 62 MMHG

## 2023-08-16 DIAGNOSIS — Z98.890 HISTORY OF ROTATOR CUFF SURGERY: ICD-10-CM

## 2023-08-16 DIAGNOSIS — R53.1 GENERALIZED WEAKNESS: Primary | ICD-10-CM

## 2023-08-16 PROCEDURE — 99284 EMERGENCY DEPT VISIT MOD MDM: CPT | Performed by: EMERGENCY MEDICINE

## 2023-08-16 PROCEDURE — 99283 EMERGENCY DEPT VISIT LOW MDM: CPT

## 2023-08-16 NOTE — ED PROVIDER NOTES
History  Chief Complaint   Patient presents with   • Medical Problem     Since February has not been lift R arm had surgery to repair tendons. Had block for procedure since has been experiencing numbness in arm and now all over. Also has been experiencing urinary incontinence      Patient is a 68-year-old male past medical history GERD seizure disorder C-spine fusion 2017 right rotator cuff surgery 2/2023 schizoaffective disorder presenting for evaluation of weakness and numbness. Patient reports that he has had worsening functional status including generalized weakness to all 4 extremities numbness throughout his entire body and multiple falls since his right shoulder surgery in February 2023. Reports that he received a nerve block injection in his neck for his right shoulder procedure and since that time his symptoms began, gradually worsening, he is attributing all of his symptoms to this nerve block and surgery. States that he is very frustrated with his situation and the care that he has received thus far at outside hospitals, states repeatedly that he used to "ride bicycles 5 miles a day and now I cannot even walk". He reports that he uses a walker at home but despite this still has had multiple falls. He is also endorsing intermittent urinary incontinence for several months. Patient is requesting a shot to fix his weakness and numbness of his right hand stating that it is fixed in a claw position. Denies any acute worsening of symptoms or any other complaints at this time. Prior to Admission Medications   Prescriptions Last Dose Informant Patient Reported? Taking?    DULoxetine (CYMBALTA) 60 mg delayed release capsule  Self Yes No   Sig: Take 60 mg by mouth every morning   QUEtiapine (SEROquel) 100 mg tablet  Self Yes No   Sig: Take 100 mg by mouth 2 (two) times a day   albuterol (VENTOLIN HFA) 90 mcg/act inhaler  Self No No   Sig: Inhale 1-2 puffs every 4 (four) hours as needed for wheezing or shortness of breath   atorvastatin (LIPITOR) 10 mg tablet   No No   Sig: Take 2 tablets (20 mg total) by mouth daily   ergocalciferol (VITAMIN D2) 50,000 units  Self No No   Sig: Take 1 capsule (50,000 Units total) by mouth once a week   ferrous sulfate 325 (65 Fe) mg tablet  Self No No   Sig: Take 1 tablet (325 mg total) by mouth daily with breakfast for 180 days   fluticasone-salmeterol (ADVAIR, WIXELA) 100-50 mcg/dose inhaler   No No   Sig: Inhale 1 puff 2 (two) times a day Rinse mouth after use.   hydrOXYzine HCL (ATARAX) 50 mg tablet  Self Yes No   Sig: Take 50 mg by mouth 3 (three) times a day    omeprazole (PriLOSEC) 40 MG capsule   No No   Sig: Take 1 capsule (40 mg total) by mouth daily      Facility-Administered Medications: None       Past Medical History:   Diagnosis Date   • Anemia    • Anxiety    • Arthritis    • Asthma    • Dermatitis of foot     Last Assessed 1/18/2016   • Edentulous    • Fracture of fibula     Distal; Last Assessed 7/11/2014   • GERD (gastroesophageal reflux disease)    • GERD (gastroesophageal reflux disease)    • History of fusion of cervical spine    • Hyperlipidemia    • Low back pain     states on prednisone for a  few days for back   • Marijuana smoker, episodic 2/17/2016   • Moderate persistent asthma     Last Assessed 7/3/2014   • Neurosis, anxiety, panic type     Resolved 8/20/2015   • Prepatellar bursitis, right knee    • Smoker    • Tinnitus    • Tobacco abuse     Last Assessed 1/18/2016   • Wears glasses     reading       Past Surgical History:   Procedure Laterality Date   • CERVICAL FUSION     • NECK SURGERY      Last Assessed 5/02/2017   • UT EXCISION PREPATELLAR BURSA Right 3/8/2018    Procedure: EXCISION BURSA PREPATELLAR;  Surgeon: Vandana Harper MD;  Location: [facility];  Service: Orthopedics   • UT SURGICAL ARTHROSCOPY SHOULDER W/ROTATOR CUFF RPR Right 7/24/2017    Procedure: ARTHROSCOPY SHOULDER, ROTATOR CUFF REPAIR,OPEN BICEP TENODESIS;  Surgeon: Hector Salter Ana Barraza MD;  Location: [facility] OR;  Service: Orthopedics   • TOOTH EXTRACTION         Family History   Problem Relation Age of Onset   • Cancer Mother         ovarian   • No Known Problems Father    • Diabetes type II Maternal Grandfather      I have reviewed and agree with the history as documented. E-Cigarette/Vaping     E-Cigarette/Vaping Substances     Social History     Tobacco Use   • Smoking status: Every Day     Packs/day: 0.50     Years: 10.00     Total pack years: 5.00     Types: Cigarettes   • Smokeless tobacco: Never   • Tobacco comments:     smoking for 20 years   Substance Use Topics   • Alcohol use: Not Currently     Alcohol/week: 1.0 standard drink of alcohol     Types: 1 Cans of beer per week     Comment: socially   • Drug use: Yes     Types: Marijuana     Comment: medical        Review of Systems   Constitutional: Negative for chills and fever. Eyes: Negative for visual disturbance. Respiratory: Negative for shortness of breath. Cardiovascular: Negative for chest pain. Gastrointestinal: Negative for abdominal pain, nausea and vomiting. Musculoskeletal: Positive for neck pain. Negative for back pain. Neurological: Positive for weakness and numbness. Negative for headaches. All other systems reviewed and are negative. Physical Exam  ED Triage Vitals [08/16/23 1034]   Temperature Pulse Respirations Blood Pressure SpO2   98.4 °F (36.9 °C) 89 17 141/62 96 %      Temp Source Heart Rate Source Patient Position - Orthostatic VS BP Location FiO2 (%)   Oral Monitor Sitting Right arm --      Pain Score       8             Orthostatic Vital Signs  Vitals:    08/16/23 1034   BP: 141/62   Pulse: 89   Patient Position - Orthostatic VS: Sitting       Physical Exam  Vitals and nursing note reviewed. HENT:      Head: Normocephalic and atraumatic. Mouth/Throat:      Mouth: Mucous membranes are moist.   Eyes:      Extraocular Movements: Extraocular movements intact.    Cardiovascular: Rate and Rhythm: Normal rate and regular rhythm. Pulmonary:      Effort: Pulmonary effort is normal.      Breath sounds: Normal breath sounds. Transmitted upper airway sounds present. Abdominal:      Palpations: Abdomen is soft. Tenderness: There is no abdominal tenderness. Musculoskeletal:      Cervical back: Normal range of motion. Skin:     General: Skin is warm and dry. Neurological:      Mental Status: He is alert and oriented to person, place, and time. Comments: Pt c/o numbness and motor weakness to R side of body, also generalized global weakness to all extremities. Believe that pt is not fully participating in neuro exam, as when I go to assess upper extremity strength he has weak squeeze, will not lift arms or push or pull stating "I can't", however as he is explaining his history he is agitated and waving his arms around up and over his head without difficulty, able to fully open and close both hands, holding MRI CDs and papers without difficulty.  Pt has 5/5 strength with S1 testing B/L LE         ED Medications  Medications - No data to display    Diagnostic Studies  Results Reviewed     None                 No orders to display         Procedures  Procedures      ED Course  ED Course as of 08/18/23 0647   Wed Aug 16, 2023   1140 Will place call out to  to see about PT/OT eval   1242 Pt requesting to leave and have PT/OT eval as outpatient                                        Medical Decision Making  Patient is a 65-year-old male presenting for evaluation of numbness and weakness    Reviewed outside records, notes, calls, imaging results from [facility] over the last several months, patient has reported these same symptoms of numbness weakness incontinence mobility issues for the past several months, no acute symptoms today    Expressed that while it is understandable that patient is frustrated with his situation we do not have any single medication or shot to fix his right tarsha or his symptoms at large. We will place call out to case management for PT/OT evaluation to see if we can help establish/set up outpatient home care. Patient is in agreement with this plan and is appreciative    Patient states that he has been waiting for an hour and wishes to leave and have a referral.  He is hemodynamically stable. We will give referral for PT OT evaluation as well as orthopedic surgery referral, as patient has been unhappy with his prior care and is requesting a new doctor. Strict return precautions given patient verbalized understanding          Disposition  Final diagnoses:   Generalized weakness   History of rotator cuff surgery     Time reflects when diagnosis was documented in both MDM as applicable and the Disposition within this note     Time User Action Codes Description Comment    8/16/2023 12:50 PM Jo Ann Pop [R53.1] Generalized weakness     8/16/2023 12:58 PM Jo Ann Pop [B16.449] History of rotator cuff surgery       ED Disposition     ED Disposition   Discharge    Condition   Stable    Date/Time   Wed Aug 16, 2023 12:51 PM    [address] [place] [address] discharge to home/self care.                Follow-up Information     Follow up With Specialties Details [facility] Call in 2 days  [address]  [phone]            Discharge Medication List as of 8/16/2023 12:59 PM      CONTINUE these medications which have NOT CHANGED    Details   albuterol (VENTOLIN HFA) 90 mcg/act inhaler Inhale 1-2 puffs every 4 (four) hours as needed for wheezing or shortness of breath, Starting Mon 10/7/2019, Normal      atorvastatin (LIPITOR) 10 mg tablet Take 2 tablets (20 mg total) by mouth daily, Starting Mon 2/3/2020, Normal      DULoxetine (CYMBALTA) 60 mg delayed release capsule Take 60 mg by mouth every morning, Starting Mon 2/5/2018, Historical Med      ergocalciferol (VITAMIN D2) 50,000 units Take 1 capsule (50,000 Units total) by mouth once a week, Starting Fri 1/25/2019, Normal      ferrous sulfate 325 (65 Fe) mg tablet Take 1 tablet (325 mg total) by mouth daily with breakfast for 180 days, Starting Wed 7/17/2019, Until Mon 1/13/2020, Normal      fluticasone-salmeterol (ADVAIR, WIXELA) 100-50 mcg/dose inhaler Inhale 1 puff 2 (two) times a day Rinse mouth after use., Starting Fri 11/22/2019, Normal      hydrOXYzine HCL (ATARAX) 50 mg tablet Take 50 mg by mouth 3 (three) times a day , Starting Fri 8/9/2019, Historical Med      omeprazole (PriLOSEC) 40 MG capsule Take 1 capsule (40 mg total) by mouth daily, Starting Fri 11/22/2019, Normal      QUEtiapine (SEROquel) 100 mg tablet Take 100 mg by mouth 2 (two) times a day, Historical Med               PDMP Review     None           ED Provider  Attending physically available and evaluated Deepak Henderson. I managed the patient along with the ED Attending.     Electronically Signed by         Ruddy Mckinney DO  08/18/23 1138

## 2023-08-16 NOTE — DISCHARGE INSTRUCTIONS
You were seen and evaluated in the emergency department for generalized weakness. We will send referral for PT OT evaluation. Follow-up with your PCP within 48 hours. If your symptoms worsen or persist please return to the emergency department for further evaluation and management.   We will also send orthopedic referral for neck pain and nerve issues that you have been dealing with
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
## 2023-08-23 ENCOUNTER — DOCUMENTATION (OUTPATIENT)
Dept: CASE MANAGEMENT | Facility: HOSPITAL | Age: 58
End: 2023-08-23

## 2023-08-23 NOTE — PROGRESS NOTES
CM received email from ER Patient Care Manager informing that pt is asking about in home therapy services as he is having difficulty caring for himself since being discharged from the ER on 8/16. CM called in an OPCM referral to [facility] line as pt's PCP is currently with [facility], however pt can switch to [facility] if he were to follow through with changing his PCP to [facility].

## 2023-09-05 DIAGNOSIS — Z78.9 NEED FOR FOLLOW-UP BY SOCIAL WORKER: Primary | ICD-10-CM

## 2023-09-14 ENCOUNTER — PATIENT OUTREACH (OUTPATIENT)
Dept: CASE MANAGEMENT | Facility: OTHER | Age: 58
End: 2023-09-14

## 2023-09-14 NOTE — PROGRESS NOTES
NILES WAY had initially received referral from GEOVANNA WAY regarding patient wanting home therapy services as he is having difficulty caring for self. Patient was set to establish care at [facility] but cancelled appointment and remained with PCP with [facility]. Referral was also placed to [facility] case management dept. NILES WAY will close referral and remain available for future psychosocial support if ever establishes with [facility]

## 2023-09-15 ENCOUNTER — OFFICE VISIT (OUTPATIENT)
Dept: OBGYN CLINIC | Facility: CLINIC | Age: 58
End: 2023-09-15
Payer: MEDICARE

## 2023-09-15 VITALS — WEIGHT: 140 LBS | BODY MASS INDEX: 24.8 KG/M2 | HEIGHT: 63 IN

## 2023-09-15 DIAGNOSIS — R20.2 PARESTHESIAS IN LEFT HAND: Primary | ICD-10-CM

## 2023-09-15 DIAGNOSIS — Z98.890 HISTORY OF ROTATOR CUFF SURGERY: ICD-10-CM

## 2023-09-15 DIAGNOSIS — R20.2 PARESTHESIAS IN RIGHT HAND: ICD-10-CM

## 2023-09-15 PROCEDURE — 99204 OFFICE O/P NEW MOD 45 MIN: CPT | Performed by: STUDENT IN AN ORGANIZED HEALTH CARE EDUCATION/TRAINING PROGRAM
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
## 2023-09-27 ENCOUNTER — TELEPHONE (OUTPATIENT)
Dept: FAMILY MEDICINE CLINIC | Facility: CLINIC | Age: 58
End: 2023-09-27

## 2023-09-27 ENCOUNTER — OFFICE VISIT (OUTPATIENT)
Dept: FAMILY MEDICINE CLINIC | Facility: CLINIC | Age: 58
End: 2023-09-27

## 2023-09-27 VITALS
RESPIRATION RATE: 18 BRPM | OXYGEN SATURATION: 96 % | TEMPERATURE: 97.3 F | HEART RATE: 69 BPM | BODY MASS INDEX: 24.8 KG/M2 | DIASTOLIC BLOOD PRESSURE: 69 MMHG | HEIGHT: 63 IN | WEIGHT: 140 LBS | SYSTOLIC BLOOD PRESSURE: 119 MMHG

## 2023-09-27 DIAGNOSIS — M54.2 NECK PAIN: ICD-10-CM

## 2023-09-27 DIAGNOSIS — R26.2 AMBULATORY DYSFUNCTION: Primary | ICD-10-CM

## 2023-09-27 DIAGNOSIS — R15.9 INCONTINENCE OF FECES, UNSPECIFIED FECAL INCONTINENCE TYPE: ICD-10-CM

## 2023-09-27 DIAGNOSIS — R23.8 SKIN BREAKDOWN: ICD-10-CM

## 2023-09-27 DIAGNOSIS — M54.12 CERVICAL RADICULOPATHY: ICD-10-CM

## 2023-09-27 DIAGNOSIS — M50.00 HNP (HERNIATED NUCLEUS PULPOSUS) WITH MYELOPATHY, CERVICAL: ICD-10-CM

## 2023-09-27 DIAGNOSIS — J45.909 UNCOMPLICATED ASTHMA, UNSPECIFIED ASTHMA SEVERITY, UNSPECIFIED WHETHER PERSISTENT: ICD-10-CM

## 2023-09-27 DIAGNOSIS — R32 URINARY INCONTINENCE, UNSPECIFIED TYPE: ICD-10-CM

## 2023-09-27 PROBLEM — Z00.00 ANNUAL PHYSICAL EXAM: Status: ACTIVE | Noted: 2023-09-27

## 2023-09-27 PROCEDURE — 99214 OFFICE O/P EST MOD 30 MIN: CPT | Performed by: FAMILY MEDICINE

## 2023-09-27 RX ORDER — ZINC OXIDE 20 %
OINTMENT (GRAM) TOPICAL AS NEEDED
Qty: 500 G | Refills: 2 | Status: SHIPPED | OUTPATIENT
Start: 2023-09-27

## 2023-09-27 RX ORDER — ZINC OXIDE 20 %
OINTMENT (GRAM) TOPICAL AS NEEDED
Status: DISCONTINUED | OUTPATIENT
Start: 2023-09-27 | End: 2023-09-27

## 2023-09-27 RX ORDER — ZINC OXIDE 20 %
OINTMENT (GRAM) TOPICAL AS NEEDED
Status: SHIPPED | OUTPATIENT
Start: 2023-09-27

## 2023-09-27 RX ORDER — FACIAL-BODY WIPES
EACH TOPICAL
Qty: 180 EACH | Refills: 5 | Status: SHIPPED | OUTPATIENT
Start: 2023-09-27

## 2023-09-27 NOTE — ASSESSMENT & PLAN NOTE
Pt had C5/6 anterior cervical discectomy with allograft fusion and titanium plate on 6/4/21 at [facility]. Patient was following with orthopedic surgery and neurosurgery. Patient requesting referral to neurosurgery for continued management. MRI from August 2023 demonstrated increased chronic compressive myelopathy or cord edema superimposed on chronic compressive myelopathy. Pt reports that he was told to see neurosurgery for management. Pt was following with [facility] neurosurgery, however, wanted to change care to [facility]. On physical exam, patient demonstrated clawing of right hand and decreased  strength of right hand. Sensation intact and equal bilaterally of upper extremities.     Plan:  -Referral to pain management  -Referral to home health for PT/OT  -Referral to neurosurgery

## 2023-09-27 NOTE — ASSESSMENT & PLAN NOTE
Patient has a history of asthma and only uses albuterol rescue inhaler as needed. Patient is a 3 to 4 cigarette/day smoker not currently interested in quitting. Patient states that he wheezes every day and is not concerned. No recent exacerbations or hospitalizations.     Plan:  -Continue current inhaler usage  -F/u in 3-6 weeks for annual physical and recheck

## 2023-09-27 NOTE — TELEPHONE ENCOUNTER
Patient in today to see Dr More Bagley, as a New Patient in need of RX for the following:     Wipes   Cream   Electric Scooter + needs new w/c   PT/OT orders     w/[facility]   [phone]

## 2023-09-27 NOTE — ASSESSMENT & PLAN NOTE
Patient states that he is "paralyzed "and unable to move his right lower extremity. He states that this has been progressively worsening since right rotator cuff surgery in February 2023. He denies other trauma, incidents, inciting events. Patient states that he is not interested in undergoing PT/OT at this time, and wishes for home health to assist him. He is also requesting a neurosurgery referral to discuss his worsening condition. Pt was seeing neurosurgery at [facility], but pt declined treatment. Pt is further requesting an electric wheelchair for use in-house and outside. On physical exam, pt spontaneously moved all four limbs, and had 5/5 strength of right foot and toe dorsiflexion, but was unable to flex at the right knee or hip. No contractures present, and sensation was equal of b/l LE.      Plan:  -Neurosurgery referral  -Home health referral  -Wheelchair evaluation ordered  -F/u in 2-6 weeks for annual physical and recheck

## 2023-09-27 NOTE — ASSESSMENT & PLAN NOTE
Patient reports fecal and urinary incontinence since February of this year. Patient has difficulty making it to the restroom or cleaning himself post incontinence episode.     Plan:  -Home health referral  -Incontinence supplies ordered

## 2023-09-27 NOTE — PROGRESS NOTES
Name: Mercy Rojas      : 1965      MRN: [id number]  Encounter Provider: Brooklynn Denise DO  Encounter Date: 2023   Encounter department: [address]     1. Ambulatory dysfunction  Assessment & Plan:  Patient states that he is "paralyzed "and unable to move his right lower extremity. He states that this has been progressively worsening since right rotator cuff surgery in 2023. He denies other trauma, incidents, inciting events. Patient states that he is not interested in undergoing PT/OT at this time, and wishes for home health to assist him. He is also requesting a neurosurgery referral to discuss his worsening condition. Pt was seeing neurosurgery at [facility], but pt declined treatment. Pt is further requesting an electric wheelchair for use in-house and outside. On physical exam, pt spontaneously moved all four limbs, and had 5/5 strength of right foot and toe dorsiflexion, but was unable to flex at the right knee or hip. No contractures present, and sensation was equal of b/l LE. Plan:  -Neurosurgery referral  -Home health referral  -Wheelchair evaluation ordered  -F/u in 2-6 weeks for annual physical and recheck    Orders:  -     Wheelchair  -     Ambulatory Referral to Neurosurgery; Future  -     Ambulatory referral to Spine & Pain Management; Future    2. HNP (herniated nucleus pulposus) with myelopathy, cervical  Assessment & Plan:  Pt had C5/6 anterior cervical discectomy with allograft fusion and titanium plate on  at [facility]. Patient was following with orthopedic surgery and neurosurgery. Patient requesting referral to neurosurgery for continued management. MRI from 2023 demonstrated increased chronic compressive myelopathy or cord edema superimposed on chronic compressive myelopathy. Pt reports that he was told to see neurosurgery for management.   Pt was following with [facility] neurosurgery, however, wanted to change care to [facility]. On physical exam, patient demonstrated clawing of right hand and decreased  strength of right hand. Sensation intact and equal bilaterally of upper extremities. Plan:  -Referral to pain management  -Referral to home health for PT/OT  -Referral to neurosurgery    Orders:  -     Wheelchair  -     Ambulatory Referral to Neurosurgery; Future  -     EXTERNAL Referral to Home Health; Future  -     Ambulatory referral to Spine & Pain Management; Future    3. Cervical radiculopathy  Assessment & Plan:  Patient reports right-sided neck and arm pain and tingling since right rotator cuff surgery in February 2023. He states that pain has been worsening, and he has developed a clawing of his right hand. He also reports dulled sensation of right upper extremity. Patient has trialed NSAIDs, gabapentin, and other pain medications in the past without relief. Patient requesting referral to neurosurgery for continued management. MRI from August 2023 demonstrated increased chronic compressive myelopathy or cord edema superimposed on chronic compressive myelopathy. Pt reports that he was told to see neurosurgery for management. Pt was following with [facility] neurosurgery, however, wanted to change care to [facility]. On physical exam, patient demonstrated clawing of right hand and decreased  strength of right hand. Sensation intact and equal bilaterally of upper extremities. Plan:  -Referral to pain management  -Referral to home health for PT/OT  -Referral to neurosurgery    Orders:  -     Ambulatory Referral to Neurosurgery; Future  -     EXTERNAL Referral to Home Health; Future  -     Ambulatory referral to Spine & Pain Management; Future    4. Skin breakdown  Assessment & Plan:  Patient reports skin breakdown due to inability to clean self after incontinence episodes. Requesting cream and incontinence supplies.     Plan:  -Barrier cream prescribed  -Incontinence supplies ordered  -Home health referral placed    Orders:  -     zinc oxide 20 % ointment  -     zinc oxide 20 % ointment; Apply topically as needed for irritation or dry skin  -     Incontinence Supply Disposable (Moist Wipes Flushable) MISC; Use 6 (six) times a day    5. Neck pain  Assessment & Plan:  See "Cervical radiculopathy" and "HNP"      6. Urinary incontinence, unspecified type  Assessment & Plan:  Patient reports fecal and urinary incontinence since February of this year. Patient has difficulty making it to the restroom or cleaning himself post incontinence episode. Plan:  -Home health referral  -Incontinence supplies ordered    Orders:  -     Incontinence Supply Disposable (Moist Wipes Flushable) MISC; Use 6 (six) times a day    7. Incontinence of feces, unspecified fecal incontinence type  Assessment & Plan:  Patient reports fecal and urinary incontinence since February of this year. Patient has difficulty making it to the restroom or cleaning himself post incontinence episode. Plan:  -Home health referral  -Incontinence supplies ordered      8. Uncomplicated asthma, unspecified asthma severity, unspecified whether persistent  Assessment & Plan:  Patient has a history of asthma and only uses albuterol rescue inhaler as needed. Patient is a 3 to 4 cigarette/day smoker not currently interested in quitting. Patient states that he wheezes every day and is not concerned. No recent exacerbations or hospitalizations. Plan:  -Continue current inhaler usage  -F/u in 3-6 weeks for annual physical and recheck        Depression Screening and Follow-up Plan: Patient was screened for depression during today's encounter. They screened negative with a PHQ-2 score of 0. Clincally patient does not have depression. No treatment is required. Tobacco Cessation Counseling: Tobacco cessation counseling was provided.  The patient is sincerely urged to quit consumption of tobacco. Katie Thurston Sandro William is not ready to quit tobacco. Medication options and side effects of medication discussed. Patient refused medication. Subjective     Patient is a 61-year-old individual who presents to the clinic today to establish care. Patient has a PMH of dyslipidemia, cervical radiculopathy, herniated nucleus pulposus, bipolar disorder, urinary and fecal incontinence, and ambulatory dysfunction. Patient states that the only regular medication that he is taking his is Lipitor and occasionally his albuterol inhaler. Patient presents with complaints of "paralysis" of his right lower extremity and weakness of his right upper extremity post-right rotator cuff repair. Pt states that his ability to walk has slowly decreased, until now when he is wheelchair-bound. Pt was seeing Neurosurgery at [facility] until recently, when the pt decided to switch care to [facility]. Patient complains of shocklike sensation down entire right side of body, inability to use his right leg, and clawing of his right hand. Patient has not trialed physical therapy and at this time is declining need for PT. Pt wishes to obtain a referral to neurosurgery, incontinence supplies, electric wheelchair    Review of Systems   Constitutional: Negative for chills, fatigue, fever and unexpected weight change. Eyes: Negative for pain and visual disturbance. Respiratory: Negative for cough, chest tightness, shortness of breath and wheezing. Cardiovascular: Negative for chest pain, palpitations and leg swelling. Gastrointestinal: Negative for abdominal distention, abdominal pain, blood in stool, constipation, diarrhea, nausea and vomiting. Fecal incontinence   Genitourinary: Negative for dysuria, flank pain, frequency, hematuria and urgency. Urinary incontinence   Musculoskeletal: Positive for back pain, gait problem, myalgias and neck pain.         Right sided neck, arm, leg pain and weakness   Neurological: Positive for weakness. Negative for dizziness, light-headedness, numbness and headaches.         Right-sided neck, arm, leg "shock-like" sensation  No sensation of right LE       Past Medical History:   Diagnosis Date   • Anemia    • Anxiety    • Arthritis    • Asthma    • Dermatitis of foot     Last Assessed 1/18/2016   • Edentulous    • Fracture of fibula     Distal; Last Assessed 7/11/2014   • GERD (gastroesophageal reflux disease)    • GERD (gastroesophageal reflux disease)    • History of fusion of cervical spine    • Hyperlipidemia    • Low back pain     states on prednisone for a  few days for back   • Marijuana smoker, episodic 2/17/2016   • Moderate persistent asthma     Last Assessed 7/3/2014   • Neurosis, anxiety, panic type     Resolved 8/20/2015   • Prepatellar bursitis, right knee    • Smoker    • Tinnitus    • Tobacco abuse     Last Assessed 1/18/2016   • Wears glasses     reading     Past Surgical History:   Procedure Laterality Date   • CERVICAL FUSION     • NECK SURGERY      Last Assessed 5/02/2017   • VA EXCISION PREPATELLAR BURSA Right 3/8/2018    Procedure: EXCISION BURSA PREPATELLAR;  Surgeon: Nelly Wright MD;  Location: [facility];  Service: Orthopedics   • VA SURGICAL ARTHROSCOPY SHOULDER W/ROTATOR CUFF RPR Right 7/24/2017    Procedure: ARTHROSCOPY SHOULDER, ROTATOR CUFF REPAIR,OPEN BICEP TENODESIS;  Surgeon: Nelly Wright MD;  Location: [facility];  Service: Orthopedics   • TOOTH EXTRACTION       Family History   Problem Relation Age of Onset   • Cancer Mother         ovarian   • No Known Problems Father    • Diabetes type II Maternal Grandfather      Social History     Socioeconomic History   • Marital status: Single     Spouse name: None   • Number of children: None   • Years of education: None   • Highest education level: None   Occupational History   • None   Tobacco Use   • Smoking status: Every Day     Packs/day: 0.50     Years: 10.00     Total pack years: 5.00     Types: Cigarettes   • Smokeless tobacco: Never   • Tobacco comments:     smoking for 20 years   Substance and Sexual Activity   • Alcohol use: Not Currently     Alcohol/week: 1.0 standard drink of alcohol     Types: 1 Cans of beer per week     Comment: socially   • Drug use: Yes     Types: Marijuana     Comment: medical   • Sexual activity: None   Other Topics Concern   • None   Social History Narrative   • None     Social Determinants of Health     Financial Resource Strain: Low Risk  (9/27/2023)    Overall Financial Resource Strain (CARDIA)    • Difficulty of Paying Living Expenses: Not hard at all   Food Insecurity: No Food Insecurity (9/27/2023)    Hunger Vital Sign    • Worried About Running Out of Food in the Last Year: Never true    • Ran Out of Food in the Last Year: Never true   Transportation Needs: Unmet Transportation Needs (9/27/2023)    PRAPARE - Transportation    • Lack of Transportation (Medical):  Yes    • Lack of Transportation (Non-Medical): Yes   Physical Activity: Inactive (9/27/2023)    Exercise Vital Sign    • Days of Exercise per Week: 0 days    • Minutes of Exercise per Session: 0 min   Stress: Stress Concern Present (9/27/2023)    [address] [place] [address]    • Feeling of Stress : Very much   Social Connections: Not on file   Intimate Partner Violence: Not At Risk (9/27/2023)    Humiliation, Afraid, Rape, and Kick questionnaire    • Fear of Current or Ex-Partner: No    • Emotionally Abused: No    • Physically Abused: No    • Sexually Abused: No   Housing Stability: Low Risk  (9/27/2023)    Housing Stability Vital Sign    • Unable to Pay for Housing in the Last Year: No    • Number of [address] in the Last Year: 1    • Unstable Housing in the Last Year: No     Current Outpatient Medications on File Prior to Visit   Medication Sig   • albuterol (VENTOLIN HFA) 90 mcg/act inhaler Inhale 1-2 puffs every 4 (four) hours as needed for wheezing or shortness of breath • atorvastatin (LIPITOR) 10 mg tablet Take 2 tablets (20 mg total) by mouth daily   • omeprazole (PriLOSEC) 40 MG capsule Take 1 capsule (40 mg total) by mouth daily   • [DISCONTINUED] ergocalciferol (VITAMIN D2) 50,000 units Take 1 capsule (50,000 Units total) by mouth once a week   • [DISCONTINUED] hydrOXYzine HCL (ATARAX) 50 mg tablet Take 50 mg by mouth 3 (three) times a day    • [DISCONTINUED] QUEtiapine (SEROquel) 100 mg tablet Take 100 mg by mouth 2 (two) times a day   • [DISCONTINUED] DULoxetine (CYMBALTA) 60 mg delayed release capsule Take 60 mg by mouth every morning (Patient not taking: Reported on 9/27/2023)   • [DISCONTINUED] ferrous sulfate 325 (65 Fe) mg tablet Take 1 tablet (325 mg total) by mouth daily with breakfast for 180 days   • [DISCONTINUED] fluticasone-salmeterol (ADVAIR, WIXELA) 100-50 mcg/dose inhaler Inhale 1 puff 2 (two) times a day Rinse mouth after use. (Patient not taking: Reported on 9/27/2023)     No Known Allergies  Immunization History   Administered Date(s) Administered   • COVID-19 PFIZER VACCINE 0.3 ML IM 12/29/2021   • INFLUENZA 11/16/2018, 10/22/2019   • Influenza Quadrivalent, 6-35 Months IM 10/20/2016   • Influenza, seasonal, injectable 10/18/2015   • Influenza, seasonal, injectable, preservative free 11/16/2018   • Pneumococcal Polysaccharide PPV23 06/13/2019   • Td (adult), adsorbed 09/27/2013   • Tdap 02/07/2017   • Zoster Vaccine Recombinant 06/13/2019, 08/23/2019       Objective     /69   Pulse 69   Temp (!) 97.3 °F (36.3 °C)   Resp 18   Ht 5' 3" (1.6 m)   Wt 63.5 kg (140 lb)   SpO2 96%   BMI 24.80 kg/m²     Physical Exam  Constitutional:       Comments: Disheveled, poor hygiene    HENT:      Head: Normocephalic and atraumatic. Mouth/Throat:      Mouth: Mucous membranes are moist.      Pharynx: Oropharynx is clear. Eyes:      Extraocular Movements: Extraocular movements intact.       Conjunctiva/sclera: Conjunctivae normal.      Pupils: Pupils are equal, round, and reactive to light. Cardiovascular:      Rate and Rhythm: Normal rate and regular rhythm. Heart sounds: Normal heart sounds. No murmur heard. No friction rub. No gallop. Pulmonary:      Effort: Pulmonary effort is normal. No respiratory distress. Breath sounds: No stridor. Wheezing present. No rhonchi or rales. Chest:      Chest wall: No tenderness. Abdominal:      General: Abdomen is flat. Bowel sounds are normal. There is no distension. Palpations: Abdomen is soft. Tenderness: There is no abdominal tenderness. There is no guarding or rebound. Musculoskeletal:      Right shoulder: Tenderness present. Decreased range of motion. Left shoulder: Tenderness present. Decreased range of motion. Right hand: Decreased range of motion. Decreased strength. Normal sensation. Normal capillary refill. Normal pulse. Left hand: Normal range of motion. Normal strength. Normal sensation. Normal capillary refill. Normal pulse. Cervical back: Normal range of motion and neck supple. Tenderness present. No rigidity. Right hip: Decreased range of motion. Decreased strength. Left hip: Normal range of motion. Normal strength. Right knee: Decreased range of motion. Left knee: Decreased range of motion. Right lower leg: No swelling. Left lower leg: No swelling. Comments: Right-hand clawing and decreased  strength\  ROM of right hand limited  ROM limited of right LE  Weakness of right LE  5/5 strength right toes and ankle flexion   Skin:     General: Skin is dry. Comments: Dry skin of b/l LE   Neurological:      Mental Status: Gunjan Munroe is alert and oriented to person, place, and time. Motor: Weakness present.       Gait: Gait abnormal.      Comments: Pt wheelchair-bound, but able to move all four extremities spontaneously  Sensation intact of b/l LE  Weakness of b/l LE (R>L)   Psychiatric:         Attention and Perception: Attention normal.         Mood and Affect: Affect is inappropriate. Speech: Speech is rapid and pressured and tangential.         Behavior: Behavior is agitated and hyperactive. Thought Content: Thought content is paranoid. Judgment: Judgment is impulsive.       Comments: Circular speech  Inability to sit still       Pat Shelton,

## 2023-09-27 NOTE — ASSESSMENT & PLAN NOTE
Patient reports skin breakdown due to inability to clean self after incontinence episodes. Requesting cream and incontinence supplies.     Plan:  -Barrier cream prescribed  -Incontinence supplies ordered  -Home health referral placed

## 2023-09-27 NOTE — ASSESSMENT & PLAN NOTE
Patient reports right-sided neck and arm pain and tingling since right rotator cuff surgery in February 2023. He states that pain has been worsening, and he has developed a clawing of his right hand. He also reports dulled sensation of right upper extremity. Patient has trialed NSAIDs, gabapentin, and other pain medications in the past without relief. Patient requesting referral to neurosurgery for continued management. MRI from August 2023 demonstrated increased chronic compressive myelopathy or cord edema superimposed on chronic compressive myelopathy. Pt reports that he was told to see neurosurgery for management. Pt was following with [facility] neurosurgery, however, wanted to change care to [facility]. On physical exam, patient demonstrated clawing of right hand and decreased  strength of right hand. Sensation intact and equal bilaterally of upper extremities.       Plan:  -Referral to pain management  -Referral to home health for PT/OT  -Referral to neurosurgery

## 2023-09-28 ENCOUNTER — HOME CARE VISIT (OUTPATIENT)
Dept: HOME HEALTH SERVICES | Facility: HOME HEALTHCARE | Age: 58
End: 2023-09-28

## 2023-09-28 ENCOUNTER — HOME HEALTH ADMISSION (OUTPATIENT)
Dept: HOME HEALTH SERVICES | Facility: HOME HEALTHCARE | Age: 58
End: 2023-09-28

## 2023-09-29 ENCOUNTER — HOME CARE VISIT (OUTPATIENT)
Dept: HOME HEALTH SERVICES | Facility: HOME HEALTHCARE | Age: 58
End: 2023-09-29

## 2023-09-29 NOTE — CASE COMMUNICATION
This is for informational purposes only. Patient is agreeable to begin home therapy next Tuesday as he has a doctor's appointment on Monday. New SOC date will be 10/3/23.  Thank you

## 2023-10-02 ENCOUNTER — HOSPITAL ENCOUNTER (INPATIENT)
Facility: HOSPITAL | Age: 58
LOS: 2 days | Discharge: LEFT BEFORE DISCHARGE | DRG: 321 | End: 2023-10-05
Attending: EMERGENCY MEDICINE | Admitting: FAMILY MEDICINE
Payer: COMMERCIAL

## 2023-10-02 ENCOUNTER — APPOINTMENT (EMERGENCY)
Dept: RADIOLOGY | Facility: HOSPITAL | Age: 58
DRG: 321 | End: 2023-10-02
Payer: COMMERCIAL

## 2023-10-02 ENCOUNTER — OFFICE VISIT (OUTPATIENT)
Dept: NEUROSURGERY | Facility: CLINIC | Age: 58
End: 2023-10-02
Payer: COMMERCIAL

## 2023-10-02 VITALS — TEMPERATURE: 97.9 F | OXYGEN SATURATION: 93 % | HEART RATE: 74 BPM

## 2023-10-02 DIAGNOSIS — M47.12 OTHER SPONDYLOSIS WITH MYELOPATHY, CERVICAL REGION: Primary | ICD-10-CM

## 2023-10-02 DIAGNOSIS — M54.12 CERVICAL RADICULOPATHY: ICD-10-CM

## 2023-10-02 DIAGNOSIS — M47.12 CERVICAL SPONDYLOSIS WITH MYELOPATHY: ICD-10-CM

## 2023-10-02 DIAGNOSIS — R62.7 FAILURE TO THRIVE IN ADULT: Primary | ICD-10-CM

## 2023-10-02 DIAGNOSIS — G95.9 CERVICAL MYELOPATHY (HCC): ICD-10-CM
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
## 2023-10-02 DIAGNOSIS — M48.02 CERVICAL STENOSIS OF SPINAL CANAL: ICD-10-CM

## 2023-10-02 LAB
ALBUMIN SERPL BCP-MCNC: 4 G/DL (ref 3.5–5)
ALP SERPL-CCNC: 69 U/L (ref 34–104)
ALT SERPL W P-5'-P-CCNC: 25 U/L (ref 7–52)
ANION GAP SERPL CALCULATED.3IONS-SCNC: 12 MMOL/L
AST SERPL W P-5'-P-CCNC: 20 U/L (ref 5–45)
BASOPHILS # BLD AUTO: 0.04 THOUSANDS/ÂΜL (ref 0–0.1)
BASOPHILS NFR BLD AUTO: 1 % (ref 0–1)
BILIRUB SERPL-MCNC: 0.35 MG/DL (ref 0.2–1)
BUN SERPL-MCNC: 14 MG/DL (ref 5–25)
CALCIUM SERPL-MCNC: 8.6 MG/DL (ref 8.4–10.2)
CHLORIDE SERPL-SCNC: 107 MMOL/L (ref 96–108)
CO2 SERPL-SCNC: 22 MMOL/L (ref 21–32)
CREAT SERPL-MCNC: 0.67 MG/DL (ref 0.6–1.3)
EOSINOPHIL # BLD AUTO: 0.11 THOUSAND/ÂΜL (ref 0–0.61)
EOSINOPHIL NFR BLD AUTO: 1 % (ref 0–6)
ERYTHROCYTE [DISTWIDTH] IN BLOOD BY AUTOMATED COUNT: 13.1 % (ref 11.6–15.1)
GLUCOSE SERPL-MCNC: 94 MG/DL (ref 65–140)
HCT VFR BLD AUTO: 41.3 % (ref 36.5–46.1)
HGB BLD-MCNC: 15.1 G/DL (ref 12–15.4)
IMM GRANULOCYTES # BLD AUTO: 0.04 THOUSAND/UL (ref 0–0.2)
IMM GRANULOCYTES NFR BLD AUTO: 1 % (ref 0–2)
LYMPHOCYTES # BLD AUTO: 1.85 THOUSANDS/ÂΜL (ref 0.6–4.47)
LYMPHOCYTES NFR BLD AUTO: 23 % (ref 14–44)
MCH RBC QN AUTO: 31 PG (ref 26.8–34.3)
MCHC RBC AUTO-ENTMCNC: 36.6 G/DL (ref 31.4–37.4)
MCV RBC AUTO: 85 FL (ref 82–98)
MONOCYTES # BLD AUTO: 0.7 THOUSAND/ÂΜL (ref 0.17–1.22)
MONOCYTES NFR BLD AUTO: 9 % (ref 4–12)
NEUTROPHILS # BLD AUTO: 5.5 THOUSANDS/ÂΜL (ref 1.85–7.62)
NEUTS SEG NFR BLD AUTO: 65 % (ref 43–75)
NRBC BLD AUTO-RTO: 0 /100 WBCS
PLATELET # BLD AUTO: 262 THOUSANDS/UL (ref 149–390)
PMV BLD AUTO: 9.3 FL (ref 8.9–12.7)
POTASSIUM SERPL-SCNC: 3.7 MMOL/L (ref 3.5–5.3)
PROT SERPL-MCNC: 5.9 G/DL (ref 6.4–8.4)
RBC # BLD AUTO: 4.87 MILLION/UL (ref 3.88–5.12)
SODIUM SERPL-SCNC: 141 MMOL/L (ref 135–147)
WBC # BLD AUTO: 8.24 THOUSAND/UL (ref 4.31–10.16)

## 2023-10-02 PROCEDURE — 85025 COMPLETE CBC W/AUTO DIFF WBC: CPT

## 2023-10-02 PROCEDURE — 99284 EMERGENCY DEPT VISIT MOD MDM: CPT

## 2023-10-02 PROCEDURE — 80053 COMPREHEN METABOLIC PANEL: CPT

## 2023-10-02 PROCEDURE — 71045 X-RAY EXAM CHEST 1 VIEW: CPT

## 2023-10-02 PROCEDURE — 36415 COLL VENOUS BLD VENIPUNCTURE: CPT

## 2023-10-02 PROCEDURE — 99285 EMERGENCY DEPT VISIT HI MDM: CPT | Performed by: EMERGENCY MEDICINE

## 2023-10-02 PROCEDURE — 99205 OFFICE O/P NEW HI 60 MIN: CPT | Performed by: STUDENT IN AN ORGANIZED HEALTH CARE EDUCATION/TRAINING PROGRAM

## 2023-10-02 RX ORDER — GABAPENTIN 100 MG/1
CAPSULE ORAL
Qty: 270 CAPSULE | Refills: 1 | Status: SHIPPED | OUTPATIENT
Start: 2023-10-02

## 2023-10-02 RX ORDER — LEVALBUTEROL INHALATION SOLUTION 1.25 MG/3ML
1.25 SOLUTION RESPIRATORY (INHALATION) ONCE
Status: COMPLETED | OUTPATIENT
Start: 2023-10-02 | End: 2023-10-03

## 2023-10-02 RX ORDER — LEVALBUTEROL INHALATION SOLUTION 1.25 MG/3ML
1.25 SOLUTION RESPIRATORY (INHALATION)
Status: DISCONTINUED | OUTPATIENT
Start: 2023-10-03 | End: 2023-10-02

## 2023-10-02 RX ORDER — IPRATROPIUM BROMIDE AND ALBUTEROL SULFATE 2.5; .5 MG/3ML; MG/3ML
3 SOLUTION RESPIRATORY (INHALATION)
Status: DISCONTINUED | OUTPATIENT
Start: 2023-10-02 | End: 2023-10-02

## 2023-10-02 RX ORDER — CHLORHEXIDINE GLUCONATE ORAL RINSE 1.2 MG/ML
15 SOLUTION DENTAL ONCE
OUTPATIENT
Start: 2023-10-02 | End: 2023-10-02

## 2023-10-02 RX ORDER — QUETIAPINE FUMARATE 300 MG/1
300 TABLET, FILM COATED ORAL ONCE
Status: COMPLETED | OUTPATIENT
Start: 2023-10-02 | End: 2023-10-02

## 2023-10-02 RX ADMIN — QUETIAPINE FUMARATE 300 MG: 300 TABLET ORAL at 23:21

## 2023-10-02 NOTE — PROGRESS NOTES
Assessment/Plan:  Brandon Wilkinson is a 70-year-old male with a history of GERD, asthma, tobacco use (2 cigarettes a day), bipolar, who presents to clinic today for evaluation of progressively worsening neck pain, bilateral upper extremity and bilateral lower extremity weakness, bilateral upper extremity and bilateral lower extremity numbness, bowel and bladder incontinence, gait instability for 6 months. MRI cervical spine shows postoperative changes at C5-6 from prior ACDF performed in 2017, it also shows a C4-5 grade 1 spondylolisthesis, C4-5 disc herniation, and C4-5 ligamentum hypertrophy causing cord compression at this level. He also has C5-6 ligamentum hypertrophy causing cervical stenosis at this level as well. He also has STIR signal change at the C4-5 facets bilaterally/facet effusions. His history and physical exam is concerning for progressively worsening cervical myelopathy due to the cervical stenosis and cord compression particularly at C4-5 from adjacent segment disease. I offered him a posterior approach cervical fusion from C3-C6 with a C4-5 laminectomy with undercutting of C3 and undercutting of C6. I discussed risks of surgery along with alternatives to surgery. He agreed to proceed. We will get preoperative clearance from his PCP and proceed with surgery. I spent 60 minutes with face-to-face time, discussing imaging findings, obtaining history and physical exam, and coordinating care. Reason for Visit:    HYACINTH Wilkinson is a 70-year-old male with a history of GERD, asthma, tobacco use (2 cigarettes a day), bipolar, who presents to clinic today for evaluation of progressively worsening neck pain, bilateral upper extremity and bilateral lower extremity weakness, bilateral upper extremity and bilateral lower extremity numbness, bowel and bladder incontinence, gait instability for 6 months.   MRI cervical spine shows postoperative changes at C5-6 from prior ACDF performed in 2017, it also shows a C4-5 grade 1 spondylolisthesis, C4-5 disc herniation, and C4-5 ligamentum hypertrophy causing cord compression at this level. He also has C5-6 ligamentum hypertrophy causing cervical stenosis at this level as well. He also has STIR signal change at the C4-5 facets bilaterally/facet effusions. He states that in 2017 he underwent a C5-6 ACDF for neck pain and arm pain that resolved since surgery. He was doing fine until February 2023 when he underwent a right-sided shoulder arthroplasty at [facility] because he was having difficulty lifting up his right shoulder. He states that approximately 1 month after the surgery he developed numbness tingling in 2 of his fingers in his right upper extremity. He also developed progressively worsening neck pain as well. Over the course of the last 6 months, he is gotten progressively worsening numbness in both of his arms and both of his legs now, progressively worsening weakness in his arms and legs particularly in his hands with significantly decreased  strength and in his right lower extremity. He also has been having progressively worsening urinary incontinence. He underwent an MRI cervical spine and presents to clinic today for second opinion. Review of Systems   Constitutional: Positive for fatigue (due to pain). HENT: Negative. Eyes: Negative. Respiratory: Negative. Cardiovascular: Negative. Gastrointestinal: Negative. Uses adult diapers   Endocrine: Negative. Genitourinary: Positive for urgency. Musculoskeletal: Positive for arthralgias (hands and legs are paralyzed), back pain, gait problem (wheelchair) and neck pain (into arms, hands numb). Geiger himself and doesn't feel it   Skin: Negative. Allergic/Immunologic: Negative. Neurological: Positive for weakness (generalized weakness) and numbness (arms and fingers). Hematological: Negative.     Psychiatric/Behavioral: Positive for agitation (due to his situation -) and sleep disturbance (due to pain, gotten worse overtime). There were no vitals taken for this visit.        Current Outpatient Medications:   •  albuterol (VENTOLIN HFA) 90 mcg/act inhaler, Inhale 1-2 puffs every 4 (four) hours as needed for wheezing or shortness of breath, Disp: 1 Inhaler, Rfl: 5  •  atorvastatin (LIPITOR) 10 mg tablet, Take 2 tablets (20 mg total) by mouth daily, Disp: 28 tablet, Rfl: 0  •  Incontinence Supply Disposable (Moist Wipes Flushable) MISC, Use 6 (six) times a day, Disp: 180 each, Rfl: 5  •  omeprazole (PriLOSEC) 40 MG capsule, Take 1 capsule (40 mg total) by mouth daily, Disp: 90 capsule, Rfl: 0  •  zinc oxide 20 % ointment, Apply topically as needed for irritation or dry skin (Patient not taking: Reported on 10/2/2023), Disp: 500 g, Rfl: 2    Current Facility-Administered Medications:   •  zinc oxide 20 % ointment, , Topical, PRN, Shannan Law, DO    Past Medical History:   Diagnosis Date   • Anemia    • Anxiety    • Arthritis    • Asthma    • Dermatitis of foot     Last Assessed 1/18/2016   • Edentulous    • Fracture of fibula     Distal; Last Assessed 7/11/2014   • GERD (gastroesophageal reflux disease)    • GERD (gastroesophageal reflux disease)    • History of fusion of cervical spine    • Hyperlipidemia    • Low back pain     states on prednisone for a  few days for back   • Marijuana smoker, episodic 2/17/2016   • Moderate persistent asthma     Last Assessed 7/3/2014   • Neurosis, anxiety, panic type     Resolved 8/20/2015   • Prepatellar bursitis, right knee    • Smoker    • Tinnitus    • Tobacco abuse     Last Assessed 1/18/2016   • Wears glasses     reading      Past Surgical History:   Procedure Laterality Date   • CERVICAL FUSION     • NECK SURGERY      Last Assessed 5/02/2017   • [place] EXCISION PREPATELLAR BURSA Right 3/8/2018    Procedure: EXCISION BURSA PREPATELLAR;  Surgeon: Elizabeth Wilde MD;  Location: [facility];  Service: Orthopedics • [place] SURGICAL ARTHROSCOPY SHOULDER W/ROTATOR CUFF RPR Right 7/24/2017    Procedure: ARTHROSCOPY SHOULDER, ROTATOR CUFF REPAIR,OPEN BICEP TENODESIS;  Surgeon: Benjamín Callaway MD;  Location: [facility];  Service: Orthopedics   • TOOTH EXTRACTION       Social History     Socioeconomic History   • Marital status: Single     Spouse name: Not on file   • Number of children: Not on file   • Years of education: Not on file   • Highest education level: Not on file   Occupational History   • Not on file   Tobacco Use   • Smoking status: Every Day     Packs/day: 0.50     Years: 10.00     Total pack years: 5.00     Types: Cigarettes   • Smokeless tobacco: Never   • Tobacco comments:     smoking for 20 years   Substance and Sexual Activity   • Alcohol use: Not Currently     Alcohol/week: 1.0 standard drink of alcohol     Types: 1 Cans of beer per week     Comment: socially   • Drug use: Yes     Types: Marijuana     Comment: medical   • Sexual activity: Not on file   Other Topics Concern   • Not on file   Social History Narrative   • Not on file     Social Determinants of Health     Financial Resource Strain: Low Risk  (9/27/2023)    Overall Financial Resource Strain (CARDIA)    • Difficulty of Paying Living Expenses: Not hard at all   Food Insecurity: No Food Insecurity (9/27/2023)    Hunger Vital Sign    • Worried About Running Out of Food in the Last Year: Never true    • Ran Out of Food in the Last Year: Never true   Transportation Needs: Unmet Transportation Needs (9/27/2023)    PRAPARE - Transportation    • Lack of Transportation (Medical):  Yes    • Lack of Transportation (Non-Medical): Yes   Physical Activity: Inactive (9/27/2023)    Exercise Vital Sign    • Days of Exercise per Week: 0 days    • Minutes of Exercise per Session: 0 min   Stress: Stress Concern Present (9/27/2023)    [facility]    • Feeling of Stress : Very much   Social Connections: Not on file   Intimate Partner Violence: Not At Risk (9/27/2023)    Humiliation, Afraid, Rape, and Kick questionnaire    • Fear of Current or Ex-Partner: No    • Emotionally Abused: No    • Physically Abused: No    • Sexually Abused: No   Housing Stability: Low Risk  (9/27/2023)    Housing Stability Vital Sign    • Unable to Pay for Housing in the Last Year: No    • Number of Places Lived in the Last Year: 1    • Unstable Housing in the Last Year: No       The following portions of the patient's history were reviewed and updated as appropriate: allergies, current medications, past family history, past medical history, past social history, past surgical history and problem list.    Physical Exam       Sitting in wheelchair  A&Ox3  Gaze conjugate  eomi  FS  TM  fcx4  Rue 3 SA 3 ef 3 ee 1-2   LUE 3 SA 3 ef 3 ee 1-2   RLE 3 HF 3 KE 1-2 DF 1-2 PF  LLE 4 hf 4 KE 4 DF 4 PF  Decreased sensation light touch BUE and BLE  + BUE downing  No babinski  No clonus    Imaging and labs:  I reviewed his MRI cervical spine which shows postoperative changes at C5-6 from prior ACDF performed in 2017, it also shows a C4-5 grade 1 spondylolisthesis, C4-5 disc herniation, and C4-5 ligamentum hypertrophy causing cord compression at this level. He also has C5-6 ligamentum hypertrophy causing cervical stenosis at this level as well. He also has STIR signal change at the C4-5 facets bilaterally/facet effusions. I reviewed his metabolic panel which shows a normal creatinine of 0.99, normal sodium of 141, and normal liver enzymes with an AST of 17 and an ALT of 19. I reviewed his vitamin D level which shows a normal vitamin D of 40.   I reviewed his CBC which shows a normal platelet count of 926 and a normal hemoglobin of 15.4

## 2023-10-03 ENCOUNTER — HOME CARE VISIT (OUTPATIENT)
Dept: HOME HEALTH SERVICES | Facility: HOME HEALTHCARE | Age: 58
End: 2023-10-03

## 2023-10-03 ENCOUNTER — APPOINTMENT (INPATIENT)
Dept: RADIOLOGY | Facility: HOSPITAL | Age: 58
DRG: 321 | End: 2023-10-03
Payer: COMMERCIAL

## 2023-10-03 PROBLEM — R62.7 FAILURE TO THRIVE IN ADULT: Status: ACTIVE | Noted: 2023-10-03

## 2023-10-03 LAB
INR PPP: 0.96 (ref 0.84–1.19)
PROTHROMBIN TIME: 12.7 SECONDS (ref 11.6–14.5)

## 2023-10-03 PROCEDURE — 94760 N-INVAS EAR/PLS OXIMETRY 1: CPT

## 2023-10-03 PROCEDURE — 85610 PROTHROMBIN TIME: CPT | Performed by: STUDENT IN AN ORGANIZED HEALTH CARE EDUCATION/TRAINING PROGRAM

## 2023-10-03 PROCEDURE — 94640 AIRWAY INHALATION TREATMENT: CPT

## 2023-10-03 PROCEDURE — 99222 1ST HOSP IP/OBS MODERATE 55: CPT | Performed by: FAMILY MEDICINE

## 2023-10-03 PROCEDURE — 94664 DEMO&/EVAL PT USE INHALER: CPT

## 2023-10-03 PROCEDURE — 72052 X-RAY EXAM NECK SPINE 6/>VWS: CPT

## 2023-10-03 PROCEDURE — 99255 IP/OBS CONSLTJ NEW/EST HI 80: CPT | Performed by: STUDENT IN AN ORGANIZED HEALTH CARE EDUCATION/TRAINING PROGRAM

## 2023-10-03 RX ORDER — IPRATROPIUM BROMIDE AND ALBUTEROL SULFATE 2.5; .5 MG/3ML; MG/3ML
3 SOLUTION RESPIRATORY (INHALATION)
Status: DISCONTINUED | OUTPATIENT
Start: 2023-10-03 | End: 2023-10-03

## 2023-10-03 RX ORDER — HYDROXYZINE HYDROCHLORIDE 25 MG/1
25 TABLET, FILM COATED ORAL
Status: DISCONTINUED | OUTPATIENT
Start: 2023-10-03 | End: 2023-10-03

## 2023-10-03 RX ORDER — IPRATROPIUM BROMIDE AND ALBUTEROL SULFATE 2.5; .5 MG/3ML; MG/3ML
3 SOLUTION RESPIRATORY (INHALATION) EVERY 6 HOURS PRN
Status: DISCONTINUED | OUTPATIENT
Start: 2023-10-03 | End: 2023-10-04

## 2023-10-03 RX ORDER — ENOXAPARIN SODIUM 100 MG/ML
40 INJECTION SUBCUTANEOUS DAILY
Status: DISCONTINUED | OUTPATIENT
Start: 2023-10-03 | End: 2023-10-04

## 2023-10-03 RX ORDER — HYDROXYZINE HYDROCHLORIDE 25 MG/1
25 TABLET, FILM COATED ORAL EVERY 6 HOURS PRN
Status: DISCONTINUED | OUTPATIENT
Start: 2023-10-03 | End: 2023-10-03

## 2023-10-03 RX ORDER — GUAIFENESIN 600 MG/1
600 TABLET, EXTENDED RELEASE ORAL EVERY 12 HOURS SCHEDULED
Status: DISCONTINUED | OUTPATIENT
Start: 2023-10-03 | End: 2023-10-05 | Stop reason: HOSPADM

## 2023-10-03 RX ORDER — ACETAMINOPHEN 325 MG/1
975 TABLET ORAL EVERY 8 HOURS PRN
Status: DISCONTINUED | OUTPATIENT
Start: 2023-10-03 | End: 2023-10-04

## 2023-10-03 RX ORDER — HYDROXYZINE HYDROCHLORIDE 25 MG/1
25 TABLET, FILM COATED ORAL 3 TIMES DAILY PRN
Status: DISCONTINUED | OUTPATIENT
Start: 2023-10-03 | End: 2023-10-05 | Stop reason: HOSPADM

## 2023-10-03 RX ORDER — NICOTINE 21 MG/24HR
1 PATCH, TRANSDERMAL 24 HOURS TRANSDERMAL DAILY
Status: DISCONTINUED | OUTPATIENT
Start: 2023-10-03 | End: 2023-10-05 | Stop reason: HOSPADM

## 2023-10-03 RX ORDER — BENZONATATE 100 MG/1
100 CAPSULE ORAL 3 TIMES DAILY PRN
Status: DISCONTINUED | OUTPATIENT
Start: 2023-10-03 | End: 2023-10-05 | Stop reason: HOSPADM

## 2023-10-03 RX ORDER — QUETIAPINE FUMARATE 100 MG/1
300 TABLET, FILM COATED ORAL
Status: DISCONTINUED | OUTPATIENT
Start: 2023-10-03 | End: 2023-10-05 | Stop reason: HOSPADM

## 2023-10-03 RX ADMIN — IPRATROPIUM BROMIDE 0.5 MG: 0.5 SOLUTION RESPIRATORY (INHALATION) at 01:34

## 2023-10-03 RX ADMIN — ENOXAPARIN SODIUM 40 MG: 40 INJECTION SUBCUTANEOUS at 09:24

## 2023-10-03 RX ADMIN — LEVALBUTEROL HYDROCHLORIDE 1.25 MG: 1.25 SOLUTION RESPIRATORY (INHALATION) at 01:34

## 2023-10-03 RX ADMIN — ACETAMINOPHEN 975 MG: 325 TABLET, FILM COATED ORAL at 20:34

## 2023-10-03 RX ADMIN — QUETIAPINE FUMARATE 300 MG: 100 TABLET ORAL at 21:12

## 2023-10-03 RX ADMIN — ACETAMINOPHEN 975 MG: 325 TABLET, FILM COATED ORAL at 09:50

## 2023-10-03 RX ADMIN — HYDROXYZINE HYDROCHLORIDE 25 MG: 25 TABLET, FILM COATED ORAL at 15:37

## 2023-10-03 RX ADMIN — HYDROXYZINE HYDROCHLORIDE 25 MG: 25 TABLET, FILM COATED ORAL at 09:50

## 2023-10-03 RX ADMIN — BENZONATATE 100 MG: 100 CAPSULE ORAL at 16:07

## 2023-10-03 RX ADMIN — HYDROXYZINE HYDROCHLORIDE 25 MG: 25 TABLET, FILM COATED ORAL at 03:03

## 2023-10-03 RX ADMIN — IPRATROPIUM BROMIDE AND ALBUTEROL SULFATE 3 ML: 2.5; .5 SOLUTION RESPIRATORY (INHALATION) at 15:47

## 2023-10-03 RX ADMIN — GUAIFENESIN 600 MG: 600 TABLET, EXTENDED RELEASE ORAL at 16:06

## 2023-10-03 RX ADMIN — NICOTINE 1 PATCH: 14 PATCH, EXTENDED RELEASE TRANSDERMAL at 09:25

## 2023-10-03 NOTE — ASSESSMENT & PLAN NOTE
- 08/07 MRI done:  Since the prior MRI 5/10/2022, the degree of central canal stenosis at C4-5  has slightly decreased and is now mild to moderate. 2. The abnormal T2 intramedullary T2 hyperintensity has progressed and now  xtends from the middle of C3 to the middle of C6.  This could be secondary to   increased compressive myelopathy or cord edema superimposed on chronic compressive myelopathy.   -Patient seen by neurosurgery 10/02 recommended patient get surgery for stenosis and cord compression    Plan:  • Added on for C3-6 laminectomy and fusion today by neurosurgery

## 2023-10-03 NOTE — ASSESSMENT & PLAN NOTE
Lab Results   Component Value Date    CHOLESTEROL 173 11/18/2019    TRIG 264 (H) 11/18/2019    HDL 46 11/18/2019    LDLCALC 92 11/18/2019       Plan:   Continue home atorvastatin 10 mg

## 2023-10-03 NOTE — ED ATTENDING ATTESTATION
10/2/2023  ISydni MD, saw and evaluated the patient. I have discussed the patient with the resident/non-physician practitioner and agree with the resident's/non-physician practitioner's findings, Plan of Care, and MDM as documented in the resident's/non-physician practitioner's note, except where noted. All available labs and Radiology studies were reviewed. I was present for key portions of any procedure(s) performed by the resident/non-physician practitioner and I was immediately available to provide assistance. At this point I agree with the current assessment done in the Emergency Department. I have conducted an independent evaluation of this patient a history and physical is as follows:    ED Course  ED Course as of 10/02/23 2310   Mon Oct 02, 2023   2220 Per resident h&p 65 YO M h/o C spine surgery complicated by cord compression last February; bilateral upper and lower ext weakness; now he cannot care for self; seen by neurosurgery earlier today; offered a posterior laminectomy for 2 weeks from today; fell earlier from standing; a controlled fall; multiple falls over the last 2 weeks; cannot care for self at home; wheezing hands distally are weaker than proximal arms; decreased sensation in limbs I/P FTT; plan to admit for FTT; screening labs; CXR r/o pneumonia     Emergency Department Note- Franchesca López 66 y.o. adult MRN: [id number]    Unit/Bed#: CRB Encounter: [id number]    Franchesca López is a 66 y.o. adult who presents with   Chief Complaint   Patient presents with   • Failure To Thrive     Pt is paralyzed in hands and right leg, unable to care for himself; neighbor was too drunk to taker care of him; patient slid off couch an hour ago and urinated himself         History of Present Illness   HPI:  Franchesca López is a 66 y.o. adult who presents for evaluation of:  Ambulatory dysfunction.   Patient states that he had neurosurgery on his cervical spine and that was complicated by cord compression. He saw [facility] neurosurgery earlier today and had neurosurgery scheduled for 2 weeks from today. Patient notes that he has been falling more frequently at home and is unable to care for himself. He is not able to get up and go to the bathroom. He denies striking his head. He notes that his arms and legs feel numb and weak; his right leg is the weakest; his right distal and left distal hand are weaker than his proximal arms. Review of Systems   Constitutional: Positive for fatigue. Negative for fever. HENT: Negative for congestion and sore throat. Respiratory: Negative for cough and shortness of breath. Cardiovascular: Negative for chest pain and palpitations. Gastrointestinal: Negative for abdominal pain and nausea. Genitourinary: Negative for flank pain and frequency. Neurological: Positive for weakness. Negative for light-headedness and headaches. Psychiatric/Behavioral: Negative for dysphoric mood and hallucinations. The patient is nervous/anxious. All other systems reviewed and are negative.       Historical Information   Past Medical History:   Diagnosis Date   • Anemia    • Anxiety    • Arthritis    • Asthma    • Dermatitis of foot     Last Assessed 1/18/2016   • Edentulous    • Fracture of fibula     Distal; Last Assessed 7/11/2014   • GERD (gastroesophageal reflux disease)    • GERD (gastroesophageal reflux disease)    • History of fusion of cervical spine    • Hyperlipidemia    • Low back pain     states on prednisone for a  few days for back   • Marijuana smoker, episodic 2/17/2016   • Moderate persistent asthma     Last Assessed 7/3/2014   • Neurosis, anxiety, panic type     Resolved 8/20/2015   • Prepatellar bursitis, right knee    • Smoker    • Tinnitus    • Tobacco abuse     Last Assessed 1/18/2016   • Wears glasses     reading     Past Surgical History:   Procedure Laterality Date   • CERVICAL FUSION     • NECK SURGERY      Last Assessed 5/02/2017   • CT EXCISION PREPATELLAR BURSA Right 3/8/2018    Procedure: EXCISION BURSA PREPATELLAR;  Surgeon: Eric Johnson MD;  Location[facility];  Service: Orthopedics   • [place] SURGICAL ARTHROSCOPY SHOULDER W/ROTATOR CUFF RPR Right 7/24/2017    Procedure: ARTHROSCOPY SHOULDER, ROTATOR CUFF REPAIR,OPEN BICEP TENODESIS;  Surgeon: Eric Johnson MD;  Location[facility];  Service: Orthopedics   • TOOTH EXTRACTION       Social History   Social History     Substance and Sexual Activity   Alcohol Use Yes   • Alcohol/week: 1.0 standard drink of alcohol   • Types: 1 Cans of beer per week    Comment: ocassionally     Social History     Substance and Sexual Activity   Drug Use Yes   • Types: Marijuana    Comment: medical     Social History     Tobacco Use   Smoking Status Every Day   • Packs/day: 0.50   • Years: 10.00   • Total pack years: 5.00   • Types: Cigarettes   Smokeless Tobacco Never   Tobacco Comments    smoking for 20 years     Family History:   Family History   Problem Relation Age of Onset   • Cancer Mother         ovarian   • No Known Problems Father    • Diabetes type II Maternal Grandfather        Meds/Allergies   PTA meds:   Prior to Admission Medications   Prescriptions Last Dose Informant Patient Reported? Taking?    Incontinence Supply Disposable (Moist Wipes Flushable) MISC   No No   Sig: Use 6 (six) times a day   albuterol (VENTOLIN HFA) 90 mcg/act inhaler  Self No No   Sig: Inhale 1-2 puffs every 4 (four) hours as needed for wheezing or shortness of breath   atorvastatin (LIPITOR) 10 mg tablet   No No   Sig: Take 2 tablets (20 mg total) by mouth daily   gabapentin (Neurontin) 100 mg capsule   No No   Sig: Take 1 capsule TID for 7 days, Then take 2 capsule TID for 7 days, Then take 3 capsule TID   omeprazole (PriLOSEC) 40 MG capsule   No No   Sig: Take 1 capsule (40 mg total) by mouth daily   zinc oxide 20 % ointment   No No   Sig: Apply topically as needed for irritation or dry skin   Patient not taking: Reported on 10/2/2023      Facility-Administered Medications Last Administration Doses Remaining   zinc oxide 20 % ointment None recorded         No Known Allergies    Objective   First Vitals:   Blood Pressure: 109/74 (10/02/23 2156)  Pulse: 81 (10/02/23 2156)  Temperature: 98.5 °F (36.9 °C) (10/02/23 2156)  Temp Source: Oral (10/02/23 2156)  Respirations: 18 (10/02/23 2156)  SpO2: 94 % (10/02/23 2156)    Current Vitals:   Blood Pressure: 109/74 (10/02/23 2156)  Pulse: 81 (10/02/23 2156)  Temperature: 98.5 °F (36.9 °C) (10/02/23 2156)  Temp Source: Oral (10/02/23 2156)  Respirations: 18 (10/02/23 2156)  SpO2: 94 % (10/02/23 2156)    No intake or output data in the 24 hours ending 10/02/23 2310    Invasive Devices     None                 Physical Exam  Vitals and nursing note reviewed. Constitutional:       General: Lydia Kitchen is not in acute distress. Appearance: Normal appearance. Lydia Kitchen is well-developed. HENT:      Head: Normocephalic and atraumatic. Right Ear: External ear normal.      Left Ear: External ear normal.      Nose: Nose normal.      Mouth/Throat:      Pharynx: No oropharyngeal exudate. Eyes:      Conjunctiva/sclera: Conjunctivae normal.      Pupils: Pupils are equal, round, and reactive to light. Cardiovascular:      Rate and Rhythm: Normal rate and regular rhythm. Pulmonary:      Effort: Pulmonary effort is normal. No respiratory distress. Abdominal:      General: Abdomen is flat. There is no distension. Palpations: Abdomen is soft. Musculoskeletal:         General: No deformity. Normal range of motion. Cervical back: Normal range of motion and neck supple. Skin:     General: Skin is warm and dry. Capillary Refill: Capillary refill takes less than 2 seconds. Neurological:      Mental Status: Lydia Kitchen is alert and oriented to person, place, and time. Mental status is at baseline. Sensory: Sensory deficit present. Motor: Weakness present. Coordination: Coordination abnormal.      Gait: Gait abnormal.   Psychiatric:         Mood and Affect: Mood normal.         Behavior: Behavior normal.         Thought Content: Thought content normal.         Judgment: Judgment normal.           Medical Decision Makin. Ambulatory dysfunction secondary to postoperative complication from cervical spine fusion: Plan to check a CBC to rule out anemia and leukocytosis; complete metabolic profile to rule out electrolyte disturbance, uremia, and disorders of glucose homeostasis, chest x-ray to rule out pneumonia; plan to admit for failure to thrive and ambulatory dysfunction.     Recent Results (from the past 36 hour(s))   CBC and differential    Collection Time: 10/02/23 10:40 PM   Result Value Ref Range    WBC 8.24 4.31 - 10.16 Thousand/uL    RBC 4.87 3.88 - 5.12 Million/uL    Hemoglobin 15.1 12.0 - 15.4 g/dL    Hematocrit 41.3 36.5 - 46.1 %    MCV 85 82 - 98 fL    MCH 31.0 26.8 - 34.3 pg    MCHC 36.6 31.4 - 37.4 g/dL    RDW 13.1 11.6 - 15.1 %    MPV 9.3 8.9 - 12.7 fL    Platelets 498 633 - 807 Thousands/uL    nRBC 0 /100 WBCs    Neutrophils Relative 65 43 - 75 %    Immat GRANS % 1 0 - 2 %    Lymphocytes Relative 23 14 - 44 %    Monocytes Relative 9 4 - 12 %    Eosinophils Relative 1 0 - 6 %    Basophils Relative 1 0 - 1 %    Neutrophils Absolute 5.50 1.85 - 7.62 Thousands/µL    Immature Grans Absolute 0.04 0.00 - 0.20 Thousand/uL    Lymphocytes Absolute 1.85 0.60 - 4.47 Thousands/µL    Monocytes Absolute 0.70 0.17 - 1.22 Thousand/µL    Eosinophils Absolute 0.11 0.00 - 0.61 Thousand/µL    Basophils Absolute 0.04 0.00 - 0.10 Thousands/µL   Comprehensive metabolic panel    Collection Time: 10/02/23 10:40 PM   Result Value Ref Range    Sodium 141 135 - 147 mmol/L    Potassium 3.7 3.5 - 5.3 mmol/L    Chloride 107 96 - 108 mmol/L    CO2 22 21 - 32 mmol/L    ANION GAP 12 mmol/L    BUN 14 5 - 25 mg/dL    Creatinine 0.67 0.60 - 1.30 mg/dL    Glucose 94 65 - 140 mg/dL Calcium 8.6 8.4 - 10.2 mg/dL    AST 20 5 - 45 U/L    ALT 25 7 - 52 U/L    Alkaline Phosphatase 69 34 - 104 U/L    Total Protein 5.9 (L) 6.4 - 8.4 g/dL    Albumin 4.0 3.5 - 5.0 g/dL    Total Bilirubin 0.35 0.20 - 1.00 mg/dL    eGFR       XR chest 1 view portable    (Results Pending)         Portions of the record may have been created with voice recognition software. Occasional wrong word or "sound a like" substitutions may have occurred due to the inherent limitations of voice recognition software. Read the chart carefully and recognize, using context, where substitutions have occurred.         Critical Care Time  Procedures

## 2023-10-03 NOTE — QUICK NOTE
Pt clear for C3-6 laminectomy and fusion tomorrow for medicine perspective.      RCRI index score is 0

## 2023-10-03 NOTE — CONSULTS
Consultation - Neurosurgery   Lydia Kitchen 66 y.o. adult MRN: [id number]  Unit/Bed#: -01 Encounter: [id number]      History of Present Illness     HPI: Jim Thomas is a 61-year-old male with a history of GERD, asthma, tobacco use (2 cigarettes a day), bipolar, who I evaluated in clinic yesterday for a 6-month history of progressively worsening neck pain, bilateral upper extremity and bilateral lower extremity weakness, bilateral upper extremity and bilateral lower extremity numbness, bowel and bladder incontinence, gait instability, and his MRI cervical spine showed postoperative changes at C5-6 from prior ACDF performed in 2017, but also C4-5 grade 1 spondylolisthesis, C4-5 disc herniation, and C4-5 ligamentum hypertrophy causing cord compression at this level. He also has C5-6 ligamentum hypertrophy causing cervical stenosis at this level as well. He also has STIR signal change at the C4-5 facets bilaterally/facet effusions. 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 I offered him a posterior approach cervical fusion from C3-C6 with a C4-5 laminectomy with undercutting of C3 and undercutting of C6. He presented to the ED overnight because he fell at home and was unable to get up due to his myelopathy and weakness. He called on his friend to assist but his friend was busy and unable to help. So he called 911 and an ambulance was brought and brought him to the emergency department.     Review of Systems    Historical Information   Past Medical History:   Diagnosis Date   • Anemia    • Anxiety    • Arthritis    • Asthma    • Dermatitis of foot     Last Assessed 1/18/2016   • Edentulous    • Fracture of fibula     Distal; Last Assessed 7/11/2014   • GERD (gastroesophageal reflux disease)    • GERD (gastroesophageal reflux disease)    • History of fusion of cervical spine    • Hyperlipidemia    • Low back pain     states on prednisone for a  few days for back   • Marijuana smoker, episodic 2/17/2016   • Moderate persistent asthma     Last Assessed 7/3/2014   • Neurosis, anxiety, panic type     Resolved 8/20/2015   • Prepatellar bursitis, right knee    • Smoker    • Tinnitus    • Tobacco abuse     Last Assessed 1/18/2016   • Wears glasses     reading     Past Surgical History:   Procedure Laterality Date   • CERVICAL FUSION     • NECK SURGERY      Last Assessed 5/02/2017   • HI EXCISION PREPATELLAR BURSA Right 3/8/2018    Procedure: EXCISION BURSA PREPATELLAR;  Surgeon: Aden Sunshine MD;  Location: [facility];  Service: Orthopedics   • HI SURGICAL ARTHROSCOPY SHOULDER W/ROTATOR CUFF RPR Right 7/24/2017    Procedure: ARTHROSCOPY SHOULDER, ROTATOR CUFF REPAIR,OPEN BICEP TENODESIS;  Surgeon: Aden Sunshine MD;  Location: [facility];  Service: Orthopedics   • TOOTH EXTRACTION       Social History     Substance and Sexual Activity   Alcohol Use Yes   • Alcohol/week: 1.0 standard drink of alcohol   • Types: 1 Cans of beer per week    Comment: ocassionally     Social History     Substance and Sexual Activity   Drug Use Yes   • Types: Marijuana    Comment: medical     Social History     Tobacco Use   Smoking Status Every Day   • Packs/day: 0.50   • Years: 10.00   • Total pack years: 5.00   • Types: Cigarettes   Smokeless Tobacco Never   Tobacco Comments    smoking for 20 years     Family History   Problem Relation Age of Onset   • Cancer Mother         ovarian   • No Known Problems Father    • Diabetes type II Maternal Grandfather        Meds/Allergies   all current active meds have been reviewed  No Known Allergies    Objective   I/O       10/01 0701  10/02 0700 10/02 0701  10/03 0700 10/03 0701  10/04 0700    Urine  450 600    Total Output  450 600    Net  -450 -600                 Physical Exam  Neurologic Exam  A&Ox3  Gaze conjugate  eomi  FS  TM  fcx4  Rue 3 SA 3 ef 3 ee 1-2   LUE 3 SA 3 ef 3 ee 1-2   RLE 3 HF 3 KE 1-2 DF 1-2 PF  LLE 4 hf 4 KE 4 DF 4 PF  Decreased sensation light touch BUE and BLE  + BUE downing  No babinski  No clonus    Vitals:Blood pressure 134/86, pulse 88, temperature (!) 97.3 °F (36.3 °C), resp. rate 18, SpO2 94 %. ,There is no height or weight on file to calculate BMI. Lab Results:   Results from last 7 days   Lab Units 10/02/23  2240   WBC Thousand/uL 8.24   HEMOGLOBIN g/dL 15.1   HEMATOCRIT % 41.3   PLATELETS Thousands/uL 262   NEUTROS PCT % 65   MONOS PCT % 9   EOS PCT % 1     Results from last 7 days   Lab Units 10/02/23  2240   POTASSIUM mmol/L 3.7   CHLORIDE mmol/L 107   CO2 mmol/L 22   BUN mg/dL 14   CREATININE mg/dL 0.67   CALCIUM mg/dL 8.6   ALK PHOS U/L 69   ALT U/L 25   AST U/L 20                 No results found for: "TROPONINT"  ABG:  Lab Results   Component Value Date    PHART 7.429 03/27/2015    CIW8EUX 38.8 03/27/2015    PO2ART 185.9 (H) 03/27/2015    LHU7EWN 25 03/27/2015    BEART 0.9 03/27/2015       Imaging Studies and labs:   I reviewed his MRI cervical spine which shows postoperative changes at C5-6 from prior ACDF performed in 2017, it also shows a C4-5 grade 1 spondylolisthesis, C4-5 disc herniation, and C4-5 ligamentum hypertrophy causing cord compression at this level. He also has C5-6 ligamentum hypertrophy causing cervical stenosis at this level as well. He also has STIR signal change at the C4-5 facets bilaterally/facet effusions. I reviewed his metabolic panel which shows a normal creatinine of 0.67, normal sodium of 141, and normal liver enzymes with an AST of 20 and an ALT of 25. I reviewed his vitamin D level which shows a normal vitamin D of 40.   I reviewed his CBC which shows a normal platelet count of 284 and a normal hemoglobin of 15.1    Assessment:  Juliocesar Krishnamurthy is a 82-year-old male with a history of GERD, asthma, tobacco use (2 cigarettes a day), bipolar, who I evaluated in clinic yesterday for a 6-month history of progressively worsening neck pain, bilateral upper extremity and bilateral lower extremity weakness, bilateral upper extremity and bilateral lower extremity numbness, bowel and bladder incontinence, gait instability, and his MRI cervical spine showed postoperative changes at C5-6 from prior ACDF performed in 2017, but also C4-5 grade 1 spondylolisthesis, C4-5 disc herniation, and C4-5 ligamentum hypertrophy causing cord compression at this level. He also has cord signal change on the STIR imaging at this level as well. He also has C5-6 ligamentum hypertrophy causing cervical stenosis at this level. He also has STIR signal change at the C4-5 facets bilaterally/facet effusions. 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 I offered him a posterior approach cervical fusion from C3-C6 with a C4-5 laminectomy with undercutting of C3 and undercutting of C6. Plan:  • Added on for C3-6 laminectomy and fusion tomorrow.   • N.p.o. at midnight  • Medicine preop clearance  • X-ray cervical spine flex ex ordered  • inr ordered  • Please call with any questions or concerns

## 2023-10-03 NOTE — ED PROVIDER NOTES
History  Chief Complaint   Patient presents with   • Failure To Thrive     Pt is paralyzed in hands and right leg, unable to care for himself; neighbor was too drunk to taker care of him; patient slid off couch an hour ago and urinated himself     Patient is a 57-year-old male with history of GERD, hyperlipidemia, history of C-spine fusion in 2017, history of right rotator cuff surgery in 2023, bipolar disorder, and schizoaffective disorder who presents for falls. Patient states that his symptoms began last year following a rotator cuff surgery. He says that he had a nerve block at that time and the doctor told him that he he might have hit one of his nerves. He says that since that time he has had progressive bilateral upper extremity and right lower extremity weakness, numbness, and tingling. He also endorses urinary and fecal incontinence associated with this. Patient states that recently he has had multiple falls secondary to being unable to ambulate properly. Per chart review, patient was seen in the neurosurgery office today, they noted that MRI from August of C-spine revealed concern for cord compression at C4-C5 and canal stenosis at C5-C6. They discussed posterior laminectomy with him which he agreed to and is scheduled for 2 weeks from today. Patient states that when he got home he fell from standing, did not strike his head, no loss of conscious. He states that secondary to his extremity weakness he was unable to get up himself. He also urinated on himself before the ambulance could arrive. He says that his neighbor usually helps him care for himself but that his neighbor was unable to do so and has been unreliable recently. Patient says that he is fallen several times recently but is sustained no significant injuries. He does point out that his bilateral elbow*scraped up. Patient states that he has lost significant weight recently secondary to being unable to feed himself.   He denies any fever, chest pain, shortness of breath, abdominal pain, nausea, vomiting. Prior to Admission Medications   Prescriptions Last Dose Informant Patient Reported? Taking?    Incontinence Supply Disposable (Moist Wipes Flushable) MISC   No No   Sig: Use 6 (six) times a day   albuterol (VENTOLIN HFA) 90 mcg/act inhaler  Self No No   Sig: Inhale 1-2 puffs every 4 (four) hours as needed for wheezing or shortness of breath   atorvastatin (LIPITOR) 10 mg tablet   No No   Sig: Take 2 tablets (20 mg total) by mouth daily   gabapentin (Neurontin) 100 mg capsule   No No   Sig: Take 1 capsule TID for 7 days, Then take 2 capsule TID for 7 days, Then take 3 capsule TID   omeprazole (PriLOSEC) 40 MG capsule   No No   Sig: Take 1 capsule (40 mg total) by mouth daily   zinc oxide 20 % ointment   No No   Sig: Apply topically as needed for irritation or dry skin   Patient not taking: Reported on 10/2/2023      Facility-Administered Medications Last Administration Doses Remaining   zinc oxide 20 % ointment None recorded           Past Medical History:   Diagnosis Date   • Anemia    • Anxiety    • Arthritis    • Asthma    • Dermatitis of foot     Last Assessed 1/18/2016   • Edentulous    • Fracture of fibula     Distal; Last Assessed 7/11/2014   • GERD (gastroesophageal reflux disease)    • GERD (gastroesophageal reflux disease)    • History of fusion of cervical spine    • Hyperlipidemia    • Low back pain     states on prednisone for a  few days for back   • Marijuana smoker, episodic 2/17/2016   • Moderate persistent asthma     Last Assessed 7/3/2014   • Neurosis, anxiety, panic type     Resolved 8/20/2015   • Prepatellar bursitis, right knee    • Smoker    • Tinnitus    • Tobacco abuse     Last Assessed 1/18/2016   • Wears glasses     reading       Past Surgical History:   Procedure Laterality Date   • CERVICAL FUSION     • NECK SURGERY      Last Assessed 5/02/2017   • MD EXCISION PREPATELLAR BURSA Right 3/8/2018    Procedure: EXCISION BURSA PREPATELLAR;  Surgeon: Eric Johnson MD;  Location: [facility];  Service: Orthopedics   • IN SURGICAL ARTHROSCOPY SHOULDER W/ROTATOR CUFF RPR Right 7/24/2017    Procedure: ARTHROSCOPY SHOULDER, ROTATOR CUFF REPAIR,OPEN BICEP TENODESIS;  Surgeon: Eric Johnson MD;  Location[facility];  Service: Orthopedics   • TOOTH EXTRACTION         Family History   Problem Relation Age of Onset   • Cancer Mother         ovarian   • No Known Problems Father    • Diabetes type II Maternal Grandfather      I have reviewed and agree with the history as documented. E-Cigarette/Vaping   • E-Cigarette Use Never User      E-Cigarette/Vaping Substances     Social History     Tobacco Use   • Smoking status: Every Day     Packs/day: 0.50     Years: 10.00     Total pack years: 5.00     Types: Cigarettes   • Smokeless tobacco: Never   • Tobacco comments:     smoking for 20 years   Vaping Use   • Vaping Use: Never used   Substance Use Topics   • Alcohol use: Yes     Alcohol/week: 1.0 standard drink of alcohol     Types: 1 Cans of beer per week     Comment: ocassionally   • Drug use: Yes     Types: Marijuana     Comment: medical        Review of Systems   Constitutional: Negative for chills and fever. HENT: Negative for congestion, rhinorrhea and sore throat. Eyes: Negative for pain and redness. Respiratory: Positive for wheezing. Negative for cough and shortness of breath. Cardiovascular: Negative for chest pain and palpitations. Gastrointestinal: Negative for abdominal pain, constipation, diarrhea, nausea and vomiting. Genitourinary: Negative for dysuria and hematuria. Musculoskeletal: Positive for neck pain. Negative for back pain. Skin: Negative for pallor and rash. Neurological: Positive for weakness and numbness. All other systems reviewed and are negative.       Physical Exam  ED Triage Vitals [10/02/23 2156]   Temperature Pulse Respirations Blood Pressure SpO2   98.5 °F (36.9 °C) 81 18 109/74 94 %      Temp Source Heart Rate Source Patient Position - Orthostatic VS BP Location FiO2 (%)   Oral Monitor Lying Right arm --      Pain Score       9             Orthostatic Vital Signs  Vitals:    10/02/23 2156   BP: 109/74   Pulse: 81   Patient Position - Orthostatic VS: Lying       Physical Exam  Vitals and nursing note reviewed. Constitutional:       General: Maritza Vasques is not in acute distress. Appearance: Normal appearance. Maritza Vasques is well-developed and normal weight. Maritza Vasques is not ill-appearing, toxic-appearing or diaphoretic. HENT:      Head: Normocephalic and atraumatic. Right Ear: External ear normal.      Left Ear: External ear normal.      Nose: Nose normal. No congestion or rhinorrhea. Mouth/Throat:      Mouth: Mucous membranes are moist.      Pharynx: Oropharynx is clear. No oropharyngeal exudate or posterior oropharyngeal erythema. Eyes:      General: No scleral icterus. Right eye: No discharge. Left eye: No discharge. Extraocular Movements: Extraocular movements intact. Conjunctiva/sclera: Conjunctivae normal.      Pupils: Pupils are equal, round, and reactive to light. Cardiovascular:      Rate and Rhythm: Normal rate and regular rhythm. Pulses: Normal pulses. Heart sounds: Normal heart sounds. No murmur heard. No friction rub. No gallop. Pulmonary:      Effort: Pulmonary effort is normal. No respiratory distress. Breath sounds: Normal breath sounds. No stridor. No wheezing or rales. Comments: Diffuse coarse lung sounds  Abdominal:      General: Abdomen is flat. Bowel sounds are normal. There is no distension. Palpations: Abdomen is soft. Tenderness: There is no abdominal tenderness. There is no guarding. Musculoskeletal:         General: No tenderness. Cervical back: Normal range of motion and neck supple. No rigidity or tenderness. Right lower leg: No edema.       Left lower leg: No edema.   Skin:     General: Skin is warm and dry. Capillary Refill: Capillary refill takes less than 2 seconds. Coloration: Skin is not jaundiced or pale. Neurological:      General: No focal deficit present. Mental Status: Adan Cai is alert and oriented to person, place, and time. Cranial Nerves: No cranial nerve deficit. Sensory: Sensory deficit present. Motor: Weakness present. Coordination: Coordination normal.      Comments: Patient is able to raise his bilateral upper extremities off the bed above his head without difficulty. When asked to push and pull my arms away from he has significant weakness however he is able to easily point to an grasp objects during our history. His  strength is markedly reduced but again he displays that he is able to grab onto objects during exam.  He has subjective decreased sensation up to his elbows bilaterally. Patient is unable to move his right lower extremity with exception of wiggling his toes. He is unable to maintain antigravity upon me raising his leg. His left lower extremity is normal in both strength and sensation.    Psychiatric:         Mood and Affect: Mood normal.         Behavior: Behavior normal.         ED Medications  Medications   enoxaparin (LOVENOX) subcutaneous injection 40 mg (has no administration in time range)   nicotine (NICODERM CQ) 14 mg/24hr TD 24 hr patch 1 patch (has no administration in time range)   ipratropium-albuterol (DUO-NEB) 0.5-2.5 mg/3 mL inhalation solution 3 mL (has no administration in time range)   QUEtiapine (SEROquel) tablet 300 mg (300 mg Oral Given 10/2/23 2321)   ipratropium (ATROVENT) 0.02 % inhalation solution 0.5 mg (0.5 mg Nebulization Given 10/3/23 0134)   levalbuterol (XOPENEX) inhalation solution 1.25 mg (1.25 mg Nebulization Given 10/3/23 0134)       Diagnostic Studies  Results Reviewed     Procedure Component Value Units Date/Time    Platelet count [[id number]] Lab Status: No result Specimen: Blood     Comprehensive metabolic panel [[id number]]  (Abnormal) Collected: 10/02/23 2240    Lab Status: Final result Specimen: Blood from Arm, Left Updated: 10/02/23 2306     Sodium 141 mmol/L      Potassium 3.7 mmol/L      Chloride 107 mmol/L      CO2 22 mmol/L      ANION GAP 12 mmol/L      BUN 14 mg/dL      Creatinine 0.67 mg/dL      Glucose 94 mg/dL      Calcium 8.6 mg/dL      AST 20 U/L      ALT 25 U/L      Alkaline Phosphatase 69 U/L      Total Protein 5.9 g/dL      Albumin 4.0 g/dL      Total Bilirubin 0.35 mg/dL      eGFR --    Narrative:      Notes:     1. eGFR calculation is only valid for adults 18 years and older. 2. EGFR calculation cannot be performed for patients who are transgender, non-binary, or whose legal sex, sex at birth, and gender identity differ.     CBC and differential [[id number]] Collected: 10/02/23 2240    Lab Status: Final result Specimen: Blood from Arm, Left Updated: 10/02/23 2248     WBC 8.24 Thousand/uL      RBC 4.87 Million/uL      Hemoglobin 15.1 g/dL      Hematocrit 41.3 %      MCV 85 fL      MCH 31.0 pg      MCHC 36.6 g/dL      RDW 13.1 %      MPV 9.3 fL      Platelets 577 Thousands/uL      nRBC 0 /100 WBCs      Neutrophils Relative 65 %      Immat GRANS % 1 %      Lymphocytes Relative 23 %      Monocytes Relative 9 %      Eosinophils Relative 1 %      Basophils Relative 1 %      Neutrophils Absolute 5.50 Thousands/µL      Immature Grans Absolute 0.04 Thousand/uL      Lymphocytes Absolute 1.85 Thousands/µL      Monocytes Absolute 0.70 Thousand/µL      Eosinophils Absolute 0.11 Thousand/µL      Basophils Absolute 0.04 Thousands/µL                  XR chest 1 view portable   ED Interpretation by Bard Leatha MD (10/02 1691)   No lobar pneumonia            Procedures  Procedures      ED Course                                       Medical Decision Making  Patient is a 61-year-old male with history of GERD, hyperlipidemia, history of C-spine fusion in 2017, history of right rotator cuff surgery in 2023, bipolar disorder, and schizoaffective disorder who presents for falls. DDx includes but not limited to C-spine cord compression versus canal stenosis, anemia, electrode abnormality, pneumonia, COPD. Patient's weakness appears to be progressing but on chart review he has been evaluated in multiple emergency departments and was seen at neurosurgery today and is scheduled for laminectomy. He has no fevers or other findings to indicate an epidural abscess or other infectious etiology at this time. Patient's pulmonary exam concerning for possible pneumonia versus COPD, patient does not seem concerned about this however he has coarse lung sounds on exam and is coughing during the exam.  Will obtain CBC, CMP, chest x-ray, DuoNeb. Requesting his night dose of Seroquel 300 mg, will administer now. Given patient's inability to care for himself at home and consider consistent assistance at home, he will need to be admitted for failure to thrive. On my exam, there is no findings concerning for traumatic fracture or head injury, no negation for imaging at this time. I discussed the case with the admitting medicine resident who agrees to admit the patient for failure to thrive. Amount and/or Complexity of Data Reviewed  Labs: ordered. Radiology: ordered and independent interpretation performed. Risk  Prescription drug management. Decision regarding hospitalization. Disposition  Final diagnoses:   Failure to thrive in adult   Fall, initial encounter     Time reflects when diagnosis was documented in both MDM as applicable and the Disposition within this note     Time User Action Codes Description Comment    10/3/2023 12:17 AM Ramon Pop [R62.7] Failure to thrive in adult     10/3/2023 12:18 AM Ramon Pop [W19. OZZU] POCE, initial encounter     10/3/2023  1:02 AM Taylor Ni [M54.12] Cervical radiculopathy       ED Disposition     ED Disposition   Admit    Condition   Stable    Date/Time   Mon Oct 2, 2023 10:44 PM    Comment              Follow-up Information    None         Patient's Medications   Discharge Prescriptions    No medications on file     No discharge procedures on file. PDMP Review     None           ED Provider  Attending physically available and evaluated Karyle Bran. I managed the patient along with the ED Attending.     Electronically Signed by         Jerome Cabrales MD  10/03/23 7994

## 2023-10-03 NOTE — ASSESSMENT & PLAN NOTE
-Patient with a history of asthma.   Possible COPD component  -On as needed albuterol at home  -Patient noted to be wheezing on exam    Plan  -DuoNebs every 6 as needed

## 2023-10-03 NOTE — ASSESSMENT & PLAN NOTE
10/2- Patient states that he is "paralyzed "and unable to move his right lower extremity. He states that this has been progressively worsening since right rotator cuff surgery in February 2023. He denies other trauma, incidents, inciting events.    -On chart review patient previously saw neurosurgery at [facility] and was recommended tx however declined at that time.   -Saw PCP on 9/27 and stated that he had worsening weakness and incontinence so wanted neurosurgery evaluation. It was noted at that visit that he was not interested in undergoing PT/OT, and wanted home health to assist him.    -Patient saw her neurosurgery on 10/02/2023. 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  -Patient reports that he is unable to live at home by himself as he is wheelchair bound and lives alone. 10/3: Neursurg will proceed with surgery tomorrow.  Would like patient to be medically optimized before procedure    Plan:  - Added on for C3-6 laminectomy and fusion today by neurosurgery  - PT/OT consult  - Case management consult

## 2023-10-03 NOTE — H&P
H&P - [address] 1965, 66 y.o. adult. MRN: [id number]    Unit/Bed#: CRB Encounter: [id number]  Primary Care Provider: Terri Kelley DO      Admission Date: 10/2/2023 2142  Admitting Provider: Sonny Gongora MD  Code Status:  Level 1 - Full Code  Diet: Diet Regular; Regular House  Consult: IP CONSULT TO CASE MANAGEMENT      ASSESSMENT & PLAN:  Discussed with [facility] team and finalization is pending attending physician attestation. * Ambulatory dysfunction  Assessment & Plan  - Patient states that he is "paralyzed "and unable to move his right lower extremity. He states that this has been progressively worsening since right rotator cuff surgery in February 2023. He denies other trauma, incidents, inciting events.    -On chart review patient previously saw neurosurgery at [facility] and was recommended tx however declined at that time.   -Saw PCP on 9/27 and stated that he had worsening weakness and incontinence so wanted neurosurgery evaluation. It was noted at that visit that he was not interested in undergoing PT/OT, and wanted home health to assist him.    -Patient saw her neurosurgery on 10/02/2023. 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  -Patient reports that he is unable to live at home by himself as he is wheelchair bound and lives alone. Plan:  -Neurosurgery consult  -PT/OT consult  -Case management consult      Failure to thrive in adult  Assessment & Plan  - Patient presented to the ED via  because he reports he is unable to take care of himself. Patient states that since he is wheelchair-bound, has worsening weakness and lives alone he has been urinating and defecating on himself  -Patient reports that he does not feel safe at home.   Per patient  and him have made reports to Department of aging to try to get patient nursing assistant but has been unable to receive those benefits  -Patient reports that because he is unable to move much of his hands, he has been unable to bathe or feed himself    Plan  -We will consult case management    Asthma  Assessment & Plan  -Patient with a history of asthma. Possible COPD component  -On as needed albuterol at home  -Patient noted to be wheezing on exam    Plan  -DuoNebs every 6 as needed      Dyslipidemia  Assessment & Plan    Lab Results   Component Value Date    CHOLESTEROL 173 11/18/2019    TRIG 264 (H) 11/18/2019    HDL 46 11/18/2019    [facility] 92 11/18/2019       Plan:   Continue home atorvastatin 10 mg    S/P cervical spinal fusion  Assessment & Plan  - 08/07 MRI done:  Since the prior MRI 5/10/2022, the degree of central canal stenosis at C4-5  has slightly decreased and is now mild to moderate. 2. The abnormal T2 intramedullary T2 hyperintensity has progressed and now  xtends from the middle of C3 to the middle of C6. This could be secondary to   increased compressive myelopathy or cord edema superimposed on chronic compressive myelopathy.   -Patient seen by neurosurgery 10/02 recommended patient get surgery for stenosis and cord compression      GERD without esophagitis  Assessment & Plan  Continue home omeprazole 40 mg daily        Disposition: Admit to Inpatient under Med-surg. VTE Pharm PPX: Enoxaparin (Lovenox)  VTE Mech PPX: sequential compression device and/or foot pump applied unless contraindicated otherwise.     Patient Active Problem List   Diagnosis   • Prepatellar bursitis of right knee   • Tobacco abuse   • GERD without esophagitis   • Incomplete tear of right rotator cuff   • Low back pain   • Neck pain   • Nicotine dependence   • S/P cervical spinal fusion   • Vitamin D deficiency   • Hypertriglyceridemia   • HNP (herniated nucleus pulposus) with myelopathy, cervical   • Dyslipidemia   • Cervical radiculopathy   • Bipolar disorder, mixed ([address])   • Asthma   • Screening for colon cancer   • Abnormal thyroid function test   • Chronic right-sided low back pain without sciatica   • Palpitation   • Marijuana smoker, episodic   • Chronic pain   • Iron deficiency anemia   • Non-healing skin lesion   • Acanthosis nigricans   • Scabies   • Ambulatory dysfunction   • Annual physical exam   • Skin breakdown   • Urinary incontinence   • Fecal incontinence   • Failure to thrive in adult         Advance Directive and Living Will:      Power of :    POLST:    Emergency contact:    Primary Emergency Contact: Justin Park   Extended Emergency Contact Information  Primary Emergency Contact: Justin Park  Mobile Phone: [phone]  Relation:       History of Present Illness      CC: Failure To Thrive (Pt is paralyzed in hands and right leg, unable to care for himself; neighbor was too drunk to taker care of him; patient slid off couch an hour ago and urinated himself)      HPI: 66 y.o. adult with a past medical history of cervical radiculopathy, herniated nuclear polyposis, bipolar disorder, urinary and fecal incontinence and ambulatory dysfunction who presented today for the inability to care for herself. Patient reports since right rotator cuff surgery and on 2/2023 patient has had progressively worsening weakness and urinary/fecal incontinence. Patient reports that he uses wheelchair to go get around and has occasionally been urinating and defecating on himself. Patient also reporting that he slid off the couch today and was unable to get himself back on so he called  bring him to the hospital.  Per patient he has Department of aging case but has had little assistance from the state. Of note patient recently saw by neurosurgery who recommended patient have surgery on 10/06. Patient states that he would be unable to care for himself until the last symptoms are getting progressively worse.      Review of systems (ROS):  Review of Systems   Constitutional: Negative for chills and fever. HENT: Negative for ear pain and sore throat. Eyes: Negative for pain and visual disturbance. Respiratory: Negative for cough and shortness of breath. Cardiovascular: Negative for chest pain and palpitations. Gastrointestinal: Negative for abdominal pain and vomiting. Genitourinary: Positive for frequency. Negative for dysuria and hematuria. Musculoskeletal: Positive for gait problem, myalgias and neck pain. Negative for arthralgias and back pain. Skin: Negative for color change. Neurological: Negative for seizures and syncope. All other systems reviewed and are negative. A 12-point, complete review of systems was reviewed and negative except as stated in above.     Historical Information   Past Medical History:   Diagnosis Date   • Anemia    • Anxiety    • Arthritis    • Asthma    • Dermatitis of foot     Last Assessed 1/18/2016   • Edentulous    • Fracture of fibula     Distal; Last Assessed 7/11/2014   • GERD (gastroesophageal reflux disease)    • GERD (gastroesophageal reflux disease)    • History of fusion of cervical spine    • Hyperlipidemia    • Low back pain     states on prednisone for a  few days for back   • Marijuana smoker, episodic 2/17/2016   • Moderate persistent asthma     Last Assessed 7/3/2014   • Neurosis, anxiety, panic type     Resolved 8/20/2015   • Prepatellar bursitis, right knee    • Smoker    • Tinnitus    • Tobacco abuse     Last Assessed 1/18/2016   • Wears glasses     reading     Past Surgical History:   Procedure Laterality Date   • CERVICAL FUSION     • NECK SURGERY      Last Assessed 5/02/2017   • GA EXCISION PREPATELLAR BURSA Right 3/8/2018    Procedure: EXCISION BURSA PREPATELLAR;  Surgeon: Eric Johnson MD;  Location: [facility];  Service: Orthopedics   • GA SURGICAL ARTHROSCOPY SHOULDER W/ROTATOR CUFF RPR Right 7/24/2017    Procedure: ARTHROSCOPY SHOULDER, ROTATOR CUFF REPAIR,OPEN BICEP TENODESIS;  Surgeon: Vandana Harper MD;  Location: [facility];  Service: Orthopedics   • TOOTH EXTRACTION         Social History   Social History     Substance and Sexual Activity   Alcohol Use Yes   • Alcohol/week: 1.0 standard drink of alcohol   • Types: 1 Cans of beer per week    Comment: ocassionally     Social History     Substance and Sexual Activity   Drug Use Yes   • Types: Marijuana    Comment: medical     Social History     Tobacco Use   Smoking Status Every Day   • Packs/day: 0.50   • Years: 10.00   • Total pack years: 5.00   • Types: Cigarettes   Smokeless Tobacco Never   Tobacco Comments    smoking for 20 years     Family History   Problem Relation Age of Onset   • Cancer Mother         ovarian   • No Known Problems Father    • Diabetes type II Maternal Grandfather        Meds/Allergies   All medications and allergies reviewed  No Known Allergies    Current Facility-Administered Medications on File Prior to Encounter   Medication Dose Route Frequency Provider Last Rate Last Admin   • zinc oxide 20 % ointment   Topical PRN Alex Seip, DO         Current Outpatient Medications on File Prior to Encounter   Medication Sig Dispense Refill   • albuterol (VENTOLIN HFA) 90 mcg/act inhaler Inhale 1-2 puffs every 4 (four) hours as needed for wheezing or shortness of breath 1 Inhaler 5   • atorvastatin (LIPITOR) 10 mg tablet Take 2 tablets (20 mg total) by mouth daily 28 tablet 0   • gabapentin (Neurontin) 100 mg capsule Take 1 capsule TID for 7 days, Then take 2 capsule TID for 7 days, Then take 3 capsule  capsule 1   • Incontinence Supply Disposable (Moist Wipes Flushable) MISC Use 6 (six) times a day 180 each 5   • omeprazole (PriLOSEC) 40 MG capsule Take 1 capsule (40 mg total) by mouth daily 90 capsule 0   • zinc oxide 20 % ointment Apply topically as needed for irritation or dry skin (Patient not taking: Reported on 10/2/2023) 500 g 2       Objective   ED Vitals & Management:  ED Triage Vitals [10/02/23 2156]   Temperature Pulse Respirations Blood Pressure SpO2   98.5 °F (36.9 °C) 81 18 109/74 94 %      Temp Source Heart Rate Source Patient Position - Orthostatic VS BP Location FiO2 (%)   Oral Monitor Lying Right arm --      Pain Score       9         Medications   ipratropium (ATROVENT) 0.02 % inhalation solution 0.5 mg (has no administration in time range)   levalbuterol (XOPENEX) inhalation solution 1.25 mg (has no administration in time range)   enoxaparin (LOVENOX) subcutaneous injection 40 mg (has no administration in time range)   nicotine (NICODERM CQ) 14 mg/24hr TD 24 hr patch 1 patch (has no administration in time range)   ipratropium-albuterol (DUO-NEB) 0.5-2.5 mg/3 mL inhalation solution 3 mL (has no administration in time range)   QUEtiapine (SEROquel) tablet 300 mg (300 mg Oral Given 10/2/23 3951)       Current Vitals:   Patient Vitals for the past 24 hrs:   BP Temp Temp src Pulse Resp SpO2   10/02/23 2156 109/74 98.5 °F (36.9 °C) Oral 81 18 94 %       No intake or output data in the 24 hours ending 10/03/23 0054    Invasive Devices     None                 Labs: I have personally reviewed pertinent reports.     Recent Results (from the past 24 hour(s))   CBC and differential    Collection Time: 10/02/23 10:40 PM   Result Value Ref Range    WBC 8.24 4.31 - 10.16 Thousand/uL    RBC 4.87 3.88 - 5.12 Million/uL    Hemoglobin 15.1 12.0 - 15.4 g/dL    Hematocrit 41.3 36.5 - 46.1 %    MCV 85 82 - 98 fL    MCH 31.0 26.8 - 34.3 pg    MCHC 36.6 31.4 - 37.4 g/dL    RDW 13.1 11.6 - 15.1 %    MPV 9.3 8.9 - 12.7 fL    Platelets 772 498 - 622 Thousands/uL    nRBC 0 /100 WBCs    Neutrophils Relative 65 43 - 75 %    Immat GRANS % 1 0 - 2 %    Lymphocytes Relative 23 14 - 44 %    Monocytes Relative 9 4 - 12 %    Eosinophils Relative 1 0 - 6 %    Basophils Relative 1 0 - 1 %    Neutrophils Absolute 5.50 1.85 - 7.62 Thousands/µL    Immature Grans Absolute 0.04 0.00 - 0.20 Thousand/uL Lymphocytes Absolute 1.85 0.60 - 4.47 Thousands/µL    Monocytes Absolute 0.70 0.17 - 1.22 Thousand/µL    Eosinophils Absolute 0.11 0.00 - 0.61 Thousand/µL    Basophils Absolute 0.04 0.00 - 0.10 Thousands/µL   Comprehensive metabolic panel    Collection Time: 10/02/23 10:40 PM   Result Value Ref Range    Sodium 141 135 - 147 mmol/L    Potassium 3.7 3.5 - 5.3 mmol/L    Chloride 107 96 - 108 mmol/L    CO2 22 21 - 32 mmol/L    ANION GAP 12 mmol/L    BUN 14 5 - 25 mg/dL    Creatinine 0.67 0.60 - 1.30 mg/dL    Glucose 94 65 - 140 mg/dL    Calcium 8.6 8.4 - 10.2 mg/dL    AST 20 5 - 45 U/L    ALT 25 7 - 52 U/L    Alkaline Phosphatase 69 34 - 104 U/L    Total Protein 5.9 (L) 6.4 - 8.4 g/dL    Albumin 4.0 3.5 - 5.0 g/dL    Total Bilirubin 0.35 0.20 - 1.00 mg/dL    eGFR         Imaging:  I have personally reviewed pertinent reports. No results found. EKG, Pathology, and Other Studies:   I have personally reviewed pertinent reports. Physical Exam    Physical Exam  Constitutional:       General: Ariella Philippe is awake. Appearance: Normal appearance. HENT:      Head: Normocephalic. Jaw: There is normal jaw occlusion. Eyes:      General: Lids are normal.   Cardiovascular:      Rate and Rhythm: Normal rate and regular rhythm. Heart sounds: Normal heart sounds, S1 normal and S2 normal.   Pulmonary:      Effort: Pulmonary effort is normal. No tachypnea or bradypnea. Breath sounds: Normal breath sounds and air entry. Abdominal:      General: Bowel sounds are normal.      Palpations: Abdomen is soft. Tenderness: There is no abdominal tenderness. Musculoskeletal:      Comments: 3/5 LUE   3/5 RLE   2/5 RUE   1/5 RLE     Patient    Skin:     General: Skin is warm. Comments: Patient noted to have dirt on fingernails, toenails and skin   Neurological:      Mental Status: Ariella Philippe is alert and oriented to person, place, and time.    Psychiatric:         Attention and Perception: Attention normal.         Behavior: Behavior is cooperative. Counseling / Coordination of Care  Total floor / unit time spent today 30 minutes. Greater than 50% of total time was spent with the patient and / or family counseling and / or coordination of care.     Luisa Gonsales MD  PGY-2, [facility]  10/03/23  12:54 AM

## 2023-10-03 NOTE — ASSESSMENT & PLAN NOTE
- Patient presented to the ED via  because he reports he is unable to take care of himself. Patient states that since he is wheelchair-bound, has worsening weakness and lives alone he has been urinating and defecating on himself  -Patient reports that he does not feel safe at home.   Per patient  and him have made reports to Department of aging to try to get patient nursing assistant but has been unable to receive those benefits  -Patient reports that because he is unable to move much of his hands, he has been unable to bathe or feed himself    Plan  -We will consult case management

## 2023-10-03 NOTE — QUICK NOTE
Patient reports he is hopeful that surgery will help him with the weakness. Npo at midnight.  Otherwise no current concerns from patient

## 2023-10-03 NOTE — PROGRESS NOTES
Patient's IV removed (site red and painful). Patient refusing new IV until tomorrow. Patient educated. [facility] chely Rain Factor aware.

## 2023-10-04 ENCOUNTER — APPOINTMENT (INPATIENT)
Dept: RADIOLOGY | Facility: HOSPITAL | Age: 58
DRG: 321 | End: 2023-10-04
Payer: COMMERCIAL

## 2023-10-04 ENCOUNTER — ANESTHESIA EVENT (INPATIENT)
Dept: PERIOP | Facility: HOSPITAL | Age: 58
DRG: 321 | End: 2023-10-04
Payer: COMMERCIAL

## 2023-10-04 ENCOUNTER — ANESTHESIA (INPATIENT)
Dept: PERIOP | Facility: HOSPITAL | Age: 58
DRG: 321 | End: 2023-10-04
Payer: COMMERCIAL

## 2023-10-04 LAB — GLUCOSE SERPL-MCNC: 101 MG/DL (ref 65–140)

## 2023-10-04 PROCEDURE — 0RG20AJ FUSION OF 2 OR MORE CERVICAL VERTEBRAL JOINTS WITH INTERBODY FUSION DEVICE, POSTERIOR APPROACH, ANTERIOR COLUMN, OPEN APPROACH: ICD-10-PCS | Performed by: STUDENT IN AN ORGANIZED HEALTH CARE EDUCATION/TRAINING PROGRAM

## 2023-10-04 PROCEDURE — C1781 MESH (IMPLANTABLE): HCPCS | Performed by: STUDENT IN AN ORGANIZED HEALTH CARE EDUCATION/TRAINING PROGRAM

## 2023-10-04 PROCEDURE — 4A11X4G MONITORING OF PERIPHERAL NERVOUS ELECTRICAL ACTIVITY, INTRAOPERATIVE, EXTERNAL APPROACH: ICD-10-PCS | Performed by: STUDENT IN AN ORGANIZED HEALTH CARE EDUCATION/TRAINING PROGRAM

## 2023-10-04 PROCEDURE — 94760 N-INVAS EAR/PLS OXIMETRY 1: CPT

## 2023-10-04 PROCEDURE — 72040 X-RAY EXAM NECK SPINE 2-3 VW: CPT

## 2023-10-04 PROCEDURE — C1713 ANCHOR/SCREW BN/BN,TIS/BN: HCPCS | Performed by: STUDENT IN AN ORGANIZED HEALTH CARE EDUCATION/TRAINING PROGRAM

## 2023-10-04 PROCEDURE — 82948 REAGENT STRIP/BLOOD GLUCOSE: CPT

## 2023-10-04 PROCEDURE — 99233 SBSQ HOSP IP/OBS HIGH 50: CPT | Performed by: FAMILY MEDICINE

## 2023-10-04 DEVICE — BIOACTIVE BONE GRAFT SUBSTITUTE, FOAM PACK; BETA-TRICALCIUM PHOSPHATE, TYPE I BOVINE COLLAGEN, AND BIOACTIVE GLASS
Type: IMPLANTABLE DEVICE | Site: SPINE CERVICAL | Status: FUNCTIONAL
Brand: VITOSS BIMODAL

## 2023-10-04 DEVICE — IMPLANTABLE DEVICE: Type: IMPLANTABLE DEVICE | Site: SPINE CERVICAL | Status: FUNCTIONAL

## 2023-10-04 DEVICE — SCREW SET YUKON: Type: IMPLANTABLE DEVICE | Site: SPINE CERVICAL | Status: FUNCTIONAL

## 2023-10-04 RX ORDER — CEFAZOLIN SODIUM 1 G/50ML
1000 SOLUTION INTRAVENOUS EVERY 8 HOURS
Status: DISCONTINUED | OUTPATIENT
Start: 2023-10-05 | End: 2023-10-05 | Stop reason: HOSPADM

## 2023-10-04 RX ORDER — MIDAZOLAM HYDROCHLORIDE 2 MG/2ML
INJECTION, SOLUTION INTRAMUSCULAR; INTRAVENOUS AS NEEDED
Status: DISCONTINUED | OUTPATIENT
Start: 2023-10-04 | End: 2023-10-04

## 2023-10-04 RX ORDER — ENOXAPARIN SODIUM 100 MG/ML
40 INJECTION SUBCUTANEOUS DAILY
Status: DISCONTINUED | OUTPATIENT
Start: 2023-10-05 | End: 2023-10-04

## 2023-10-04 RX ORDER — DEXAMETHASONE SODIUM PHOSPHATE 10 MG/ML
INJECTION, SOLUTION INTRAMUSCULAR; INTRAVENOUS AS NEEDED
Status: DISCONTINUED | OUTPATIENT
Start: 2023-10-04 | End: 2023-10-04

## 2023-10-04 RX ORDER — GLYCOPYRROLATE 0.2 MG/ML
INJECTION INTRAMUSCULAR; INTRAVENOUS AS NEEDED
Status: DISCONTINUED | OUTPATIENT
Start: 2023-10-04 | End: 2023-10-04

## 2023-10-04 RX ORDER — CEFAZOLIN SODIUM 2 G/50ML
2000 SOLUTION INTRAVENOUS EVERY 8 HOURS
Status: DISCONTINUED | OUTPATIENT
Start: 2023-10-04 | End: 2023-10-04

## 2023-10-04 RX ORDER — LIDOCAINE HYDROCHLORIDE AND EPINEPHRINE 10; 10 MG/ML; UG/ML
INJECTION, SOLUTION INFILTRATION; PERINEURAL AS NEEDED
Status: DISCONTINUED | OUTPATIENT
Start: 2023-10-04 | End: 2023-10-04 | Stop reason: HOSPADM

## 2023-10-04 RX ORDER — LIDOCAINE HYDROCHLORIDE 10 MG/ML
INJECTION, SOLUTION EPIDURAL; INFILTRATION; INTRACAUDAL; PERINEURAL AS NEEDED
Status: DISCONTINUED | OUTPATIENT
Start: 2023-10-04 | End: 2023-10-04

## 2023-10-04 RX ORDER — CALCIUM CARBONATE 500 MG/1
1000 TABLET, CHEWABLE ORAL DAILY PRN
Status: DISCONTINUED | OUTPATIENT
Start: 2023-10-04 | End: 2023-10-05 | Stop reason: HOSPADM

## 2023-10-04 RX ORDER — SODIUM CHLORIDE 9 MG/ML
75 INJECTION, SOLUTION INTRAVENOUS CONTINUOUS
Status: DISCONTINUED | OUTPATIENT
Start: 2023-10-04 | End: 2023-10-05 | Stop reason: HOSPADM

## 2023-10-04 RX ORDER — DIPHENHYDRAMINE HYDROCHLORIDE 50 MG/ML
12.5 INJECTION INTRAMUSCULAR; INTRAVENOUS ONCE AS NEEDED
Status: DISCONTINUED | OUTPATIENT
Start: 2023-10-04 | End: 2023-10-04 | Stop reason: HOSPADM

## 2023-10-04 RX ORDER — SODIUM CHLORIDE 9 MG/ML
INJECTION, SOLUTION INTRAVENOUS CONTINUOUS PRN
Status: DISCONTINUED | OUTPATIENT
Start: 2023-10-04 | End: 2023-10-04

## 2023-10-04 RX ORDER — ONDANSETRON 2 MG/ML
INJECTION INTRAMUSCULAR; INTRAVENOUS AS NEEDED
Status: DISCONTINUED | OUTPATIENT
Start: 2023-10-04 | End: 2023-10-04

## 2023-10-04 RX ORDER — ALBUTEROL SULFATE 2.5 MG/3ML
2.5 SOLUTION RESPIRATORY (INHALATION) ONCE AS NEEDED
Status: DISCONTINUED | OUTPATIENT
Start: 2023-10-04 | End: 2023-10-04 | Stop reason: HOSPADM

## 2023-10-04 RX ORDER — ALBUTEROL SULFATE 2.5 MG/3ML
2.5 SOLUTION RESPIRATORY (INHALATION) EVERY 4 HOURS PRN
Status: DISCONTINUED | OUTPATIENT
Start: 2023-10-04 | End: 2023-10-05 | Stop reason: HOSPADM

## 2023-10-04 RX ORDER — PROPOFOL 10 MG/ML
INJECTION, EMULSION INTRAVENOUS AS NEEDED
Status: DISCONTINUED | OUTPATIENT
Start: 2023-10-04 | End: 2023-10-04

## 2023-10-04 RX ORDER — PROPOFOL 10 MG/ML
INJECTION, EMULSION INTRAVENOUS CONTINUOUS PRN
Status: DISCONTINUED | OUTPATIENT
Start: 2023-10-04 | End: 2023-10-04

## 2023-10-04 RX ORDER — SODIUM CHLORIDE, SODIUM LACTATE, POTASSIUM CHLORIDE, CALCIUM CHLORIDE 600; 310; 30; 20 MG/100ML; MG/100ML; MG/100ML; MG/100ML
INJECTION, SOLUTION INTRAVENOUS CONTINUOUS PRN
Status: DISCONTINUED | OUTPATIENT
Start: 2023-10-04 | End: 2023-10-04

## 2023-10-04 RX ORDER — FENTANYL CITRATE/PF 50 MCG/ML
25 SYRINGE (ML) INJECTION
Status: DISCONTINUED | OUTPATIENT
Start: 2023-10-04 | End: 2023-10-04 | Stop reason: HOSPADM

## 2023-10-04 RX ORDER — DOCUSATE SODIUM 100 MG/1
100 CAPSULE, LIQUID FILLED ORAL 2 TIMES DAILY
Status: DISCONTINUED | OUTPATIENT
Start: 2023-10-04 | End: 2023-10-05 | Stop reason: HOSPADM

## 2023-10-04 RX ORDER — SENNOSIDES 8.6 MG
1 TABLET ORAL DAILY
Status: DISCONTINUED | OUTPATIENT
Start: 2023-10-05 | End: 2023-10-05 | Stop reason: HOSPADM

## 2023-10-04 RX ORDER — HYDROMORPHONE HCL/PF 1 MG/ML
0.5 SYRINGE (ML) INJECTION EVERY 2 HOUR PRN
Status: DISCONTINUED | OUTPATIENT
Start: 2023-10-04 | End: 2023-10-05 | Stop reason: HOSPADM

## 2023-10-04 RX ORDER — ONDANSETRON 2 MG/ML
4 INJECTION INTRAMUSCULAR; INTRAVENOUS EVERY 6 HOURS PRN
Status: DISCONTINUED | OUTPATIENT
Start: 2023-10-04 | End: 2023-10-05 | Stop reason: HOSPADM

## 2023-10-04 RX ORDER — CYCLOBENZAPRINE HCL 10 MG
10 TABLET ORAL 3 TIMES DAILY
Status: DISCONTINUED | OUTPATIENT
Start: 2023-10-04 | End: 2023-10-05 | Stop reason: HOSPADM

## 2023-10-04 RX ORDER — HYDROMORPHONE HCL/PF 1 MG/ML
0.5 SYRINGE (ML) INJECTION
Status: COMPLETED | OUTPATIENT
Start: 2023-10-04 | End: 2023-10-04

## 2023-10-04 RX ORDER — FENTANYL CITRATE 50 UG/ML
INJECTION, SOLUTION INTRAMUSCULAR; INTRAVENOUS AS NEEDED
Status: DISCONTINUED | OUTPATIENT
Start: 2023-10-04 | End: 2023-10-04

## 2023-10-04 RX ORDER — VANCOMYCIN HYDROCHLORIDE 1 G/20ML
INJECTION, POWDER, LYOPHILIZED, FOR SOLUTION INTRAVENOUS AS NEEDED
Status: DISCONTINUED | OUTPATIENT
Start: 2023-10-04 | End: 2023-10-04 | Stop reason: HOSPADM

## 2023-10-04 RX ORDER — METHOCARBAMOL 750 MG/1
750 TABLET, FILM COATED ORAL EVERY 6 HOURS SCHEDULED
Status: DISCONTINUED | OUTPATIENT
Start: 2023-10-04 | End: 2023-10-05 | Stop reason: HOSPADM

## 2023-10-04 RX ORDER — CEFAZOLIN SODIUM 1 G/3ML
INJECTION, POWDER, FOR SOLUTION INTRAMUSCULAR; INTRAVENOUS AS NEEDED
Status: DISCONTINUED | OUTPATIENT
Start: 2023-10-04 | End: 2023-10-04

## 2023-10-04 RX ORDER — ACETAMINOPHEN 325 MG/1
975 TABLET ORAL EVERY 8 HOURS SCHEDULED
Status: DISCONTINUED | OUTPATIENT
Start: 2023-10-04 | End: 2023-10-05 | Stop reason: HOSPADM

## 2023-10-04 RX ORDER — OXYCODONE HYDROCHLORIDE 10 MG/1
10 TABLET ORAL EVERY 4 HOURS PRN
Status: DISCONTINUED | OUTPATIENT
Start: 2023-10-04 | End: 2023-10-05 | Stop reason: HOSPADM

## 2023-10-04 RX ORDER — ONDANSETRON 2 MG/ML
4 INJECTION INTRAMUSCULAR; INTRAVENOUS ONCE AS NEEDED
Status: DISCONTINUED | OUTPATIENT
Start: 2023-10-04 | End: 2023-10-04 | Stop reason: HOSPADM

## 2023-10-04 RX ORDER — OXYCODONE HYDROCHLORIDE 5 MG/1
5 TABLET ORAL EVERY 4 HOURS PRN
Status: DISCONTINUED | OUTPATIENT
Start: 2023-10-04 | End: 2023-10-05 | Stop reason: HOSPADM

## 2023-10-04 RX ORDER — LIDOCAINE 50 MG/G
2 PATCH TOPICAL DAILY
Status: DISCONTINUED | OUTPATIENT
Start: 2023-10-05 | End: 2023-10-05 | Stop reason: HOSPADM

## 2023-10-04 RX ORDER — BISACODYL 10 MG
10 SUPPOSITORY, RECTAL RECTAL DAILY PRN
Status: DISCONTINUED | OUTPATIENT
Start: 2023-10-04 | End: 2023-10-05 | Stop reason: HOSPADM

## 2023-10-04 RX ORDER — ROCURONIUM BROMIDE 10 MG/ML
INJECTION, SOLUTION INTRAVENOUS AS NEEDED
Status: DISCONTINUED | OUTPATIENT
Start: 2023-10-04 | End: 2023-10-04

## 2023-10-04 RX ORDER — KETAMINE HCL IN NACL, ISO-OSM 100MG/10ML
SYRINGE (ML) INJECTION AS NEEDED
Status: DISCONTINUED | OUTPATIENT
Start: 2023-10-04 | End: 2023-10-04

## 2023-10-04 RX ADMIN — ROCURONIUM BROMIDE 20 MG: 10 INJECTION, SOLUTION INTRAVENOUS at 16:33

## 2023-10-04 RX ADMIN — SODIUM CHLORIDE, SODIUM LACTATE, POTASSIUM CHLORIDE, AND CALCIUM CHLORIDE: .6; .31; .03; .02 INJECTION, SOLUTION INTRAVENOUS at 15:24

## 2023-10-04 RX ADMIN — SODIUM CHLORIDE: 0.9 INJECTION, SOLUTION INTRAVENOUS at 15:35

## 2023-10-04 RX ADMIN — CEFAZOLIN SODIUM 1000 MG: 1 SOLUTION INTRAVENOUS at 23:58

## 2023-10-04 RX ADMIN — DEXAMETHASONE SODIUM PHOSPHATE 10 MG: 10 INJECTION, SOLUTION INTRAMUSCULAR; INTRAVENOUS at 15:32

## 2023-10-04 RX ADMIN — NICOTINE 1 PATCH: 14 PATCH, EXTENDED RELEASE TRANSDERMAL at 09:22

## 2023-10-04 RX ADMIN — ROCURONIUM BROMIDE 30 MG: 10 INJECTION, SOLUTION INTRAVENOUS at 15:28

## 2023-10-04 RX ADMIN — FENTANYL CITRATE 50 MCG: 50 INJECTION, SOLUTION INTRAMUSCULAR; INTRAVENOUS at 18:10

## 2023-10-04 RX ADMIN — HYDROXYZINE HYDROCHLORIDE 25 MG: 25 TABLET, FILM COATED ORAL at 23:59

## 2023-10-04 RX ADMIN — HYDROMORPHONE HYDROCHLORIDE 0.5 MG: 1 INJECTION, SOLUTION INTRAMUSCULAR; INTRAVENOUS; SUBCUTANEOUS at 21:21

## 2023-10-04 RX ADMIN — SODIUM CHLORIDE 75 ML/HR: 0.9 INJECTION, SOLUTION INTRAVENOUS at 21:07

## 2023-10-04 RX ADMIN — FENTANYL CITRATE 25 MCG: 50 INJECTION INTRAMUSCULAR; INTRAVENOUS at 19:02

## 2023-10-04 RX ADMIN — LIDOCAINE HYDROCHLORIDE 50 MG: 10 INJECTION, SOLUTION EPIDURAL; INFILTRATION; INTRACAUDAL; PERINEURAL at 15:28

## 2023-10-04 RX ADMIN — SUGAMMADEX 150 MG: 100 INJECTION, SOLUTION INTRAVENOUS at 17:00

## 2023-10-04 RX ADMIN — HYDROXYZINE HYDROCHLORIDE 25 MG: 25 TABLET, FILM COATED ORAL at 06:34

## 2023-10-04 RX ADMIN — METHOCARBAMOL 750 MG: 750 TABLET ORAL at 23:59

## 2023-10-04 RX ADMIN — REMIFENTANIL HYDROCHLORIDE 0.2 MCG/KG/MIN: 1 INJECTION, POWDER, LYOPHILIZED, FOR SOLUTION INTRAVENOUS at 15:41

## 2023-10-04 RX ADMIN — QUETIAPINE FUMARATE 300 MG: 100 TABLET ORAL at 21:23

## 2023-10-04 RX ADMIN — FENTANYL CITRATE 100 MCG: 50 INJECTION, SOLUTION INTRAMUSCULAR; INTRAVENOUS at 15:28

## 2023-10-04 RX ADMIN — HYDROMORPHONE HYDROCHLORIDE 0.5 MG: 1 INJECTION, SOLUTION INTRAMUSCULAR; INTRAVENOUS; SUBCUTANEOUS at 19:08

## 2023-10-04 RX ADMIN — SODIUM CHLORIDE, SODIUM LACTATE, POTASSIUM CHLORIDE, AND CALCIUM CHLORIDE: .6; .31; .03; .02 INJECTION, SOLUTION INTRAVENOUS at 17:27

## 2023-10-04 RX ADMIN — PROPOFOL 120 MG: 10 INJECTION, EMULSION INTRAVENOUS at 15:28

## 2023-10-04 RX ADMIN — OXYCODONE HYDROCHLORIDE 10 MG: 10 TABLET ORAL at 23:59

## 2023-10-04 RX ADMIN — FENTANYL CITRATE 50 MCG: 50 INJECTION, SOLUTION INTRAMUSCULAR; INTRAVENOUS at 16:59

## 2023-10-04 RX ADMIN — ONDANSETRON 4 MG: 2 INJECTION INTRAMUSCULAR; INTRAVENOUS at 18:09

## 2023-10-04 RX ADMIN — GLYCOPYRROLATE 0.2 MG: 0.2 INJECTION, SOLUTION INTRAMUSCULAR; INTRAVENOUS at 16:05

## 2023-10-04 RX ADMIN — GUAIFENESIN 600 MG: 600 TABLET, EXTENDED RELEASE ORAL at 09:22

## 2023-10-04 RX ADMIN — HYDROMORPHONE HYDROCHLORIDE 0.5 MG: 1 INJECTION, SOLUTION INTRAMUSCULAR; INTRAVENOUS; SUBCUTANEOUS at 19:18

## 2023-10-04 RX ADMIN — PHENYLEPHRINE HYDROCHLORIDE 40 MCG/MIN: 10 INJECTION INTRAVENOUS at 15:46

## 2023-10-04 RX ADMIN — FENTANYL CITRATE 25 MCG: 50 INJECTION INTRAMUSCULAR; INTRAVENOUS at 18:57

## 2023-10-04 RX ADMIN — ACETAMINOPHEN 975 MG: 325 TABLET, FILM COATED ORAL at 06:34

## 2023-10-04 RX ADMIN — CEFAZOLIN 2000 MG: 1 INJECTION, POWDER, FOR SOLUTION INTRAMUSCULAR; INTRAVENOUS at 16:08

## 2023-10-04 RX ADMIN — Medication 30 MG: at 17:08

## 2023-10-04 RX ADMIN — PROPOFOL 100 MCG/KG/MIN: 10 INJECTION, EMULSION INTRAVENOUS at 15:41

## 2023-10-04 RX ADMIN — MIDAZOLAM 2 MG: 1 INJECTION INTRAMUSCULAR; INTRAVENOUS at 15:23

## 2023-10-04 RX ADMIN — PROPOFOL 50 MG: 10 INJECTION, EMULSION INTRAVENOUS at 15:46

## 2023-10-04 NOTE — CASE MANAGEMENT
Case Management Discharge Planning Note    Patient name Alley Mark  Location OR POOL/OR POOL MRN [id number]  : 1965 Date 10/4/2023       Current Admission Date: 10/2/2023  Current Admission Diagnosis:Ambulatory dysfunction   Patient Active Problem List    Diagnosis Date Noted   • Failure to thrive in adult 10/03/2023   • Ambulatory dysfunction 2023   • Annual physical exam 2023   • Skin breakdown 2023   • Urinary incontinence 2023   • Fecal incontinence 2023   • Acanthosis nigricans 2019   • Scabies 2019   • Iron deficiency anemia 2018   • Non-healing skin lesion 2018   • Palpitation 2018   • Chronic right-sided low back pain without sciatica 2018   • Tobacco abuse 2018   • Screening for colon cancer 2018   • Abnormal thyroid function test 2018   • Low back pain 2018   • Incomplete tear of right rotator cuff 2017   • S/P cervical spinal fusion 2017   • Neck pain 2017   • HNP (herniated nucleus pulposus) with myelopathy, cervical 2017   • Nicotine dependence 2016   • Dyslipidemia 2016   • Vitamin D deficiency 10/20/2016   • Prepatellar bursitis of right knee 2016   • Chronic pain 2016   • Hypertriglyceridemia 2016   • Marijuana smoker, episodic 2016   • Cervical radiculopathy 2015   • Bipolar disorder, mixed ([address]) 10/20/2014   • GERD without esophagitis 2014   • Asthma 2014      LOS (days): 1  Geometric Mean LOS (GMLOS) (days):   Days to GMLOS:     OBJECTIVE:  Risk of Unplanned Readmission Score: 11.98         Current admission status: Inpatient   Preferred Pharmacy:   [facility] - [address] [facility]  [address]  [address] [place] [address]  Phone: [phone] Fax: [phone]    [id number] [place] - [address] [place] [address]. [facility]. [facility].   [facility] [address]  Phone: [phone] Fax: [phone]    Primary Care Provider: Josee Marques DO    Primary Insurance: [facility]  Secondary Insurance:     DISCHARGE DETAILS:          Comments - Freedom of Choice: Attempted to meet with patient he was in the OR.

## 2023-10-04 NOTE — OCCUPATIONAL THERAPY NOTE
Occupational Therapy Cancel Note        Patient Name: Juve Dan  [id number] Date: 10/4/2023       10/04/23 0747   OT Last Visit   OT Visit Date 10/04/23   Note Type   Note type Evaluation; Cancelled Session   Cancel Reasons Patient to operating room       OT orders received and chart was reviewed. Pt currently pending OR for C3-6 laminectomy and fusion. Will continue to follow on caseload and see s/p procedure when appropriate.     Tita Boyd MS, OTR/L

## 2023-10-04 NOTE — PROGRESS NOTES
Progress Note - Neurosurgery   Gely Horn 66 y.o. adult MRN: [id number]  Unit/Bed#: -01 Encounter: [id number]    Assessment:  Austin Dec a 77-year-old male with a history of GERD, asthma, tobacco use (2 cigarettes a day), bipolar, who presented with a 6-month history of progressively worsening neck pain, bilateral upper extremity and bilateral lower extremity weakness, bilateral upper extremity and bilateral lower extremity numbness, bowel and bladder incontinence, gait instability, and his MRI cervical spine showed postoperative changes at C5-6 from prior ACDF performed in 2017, but also C4-5 grade 1 spondylolisthesis, C4-5 disc herniation, and C4-5 ligamentum hypertrophy causing cord compression at this level. He also has cord signal change on the STIR imaging at this level as well. He also has STIR signal change at the C4-5 facets bilaterally/facet effusions.  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 Plan:  -npo  -or today for C3-6 lami/fusion    Subjective/Objective   S:mundo      I/O       10/02 0701  10/03 0700 10/03 0701  10/04 0700 10/04 0701  10/05 0700    P. O.  465     Total Intake  465     Urine 450 1150     Stool  0     Total Output 450 1150     Net -450 -685            Unmeasured Stool Occurrence  1 x           Invasive Devices     None                 Physical Exam:  Vitals: Blood pressure 127/87, pulse 78, temperature 98.4 °F (36.9 °C), resp. rate 16, SpO2 96 %. ,There is no height or weight on file to calculate BMI.     A&Ox3  Gaze conjugate  eomi  FS  TM  fcx4  Rue 3 SA 3 ef 3 ee 1-2   LUE 3 SA 3 ef 3 ee 1-2   RLE 3 HF 3 KE 1-2 DF 1-2 PF  LLE 4 hf 4 KE 4 DF 4 PF  Decreased sensation light touch BUE and BLE  + BUE downing  No babinski  No clonus      Lab Results:  Results from last 7 days   Lab Units 10/02/23  2240   WBC Thousand/uL 8.24   HEMOGLOBIN g/dL 15.1   HEMATOCRIT % 41.3   PLATELETS Thousands/uL 262   NEUTROS PCT % 65   MONOS PCT % 9   EOS PCT % 1     Results from last 7 days   Lab Units 10/02/23  2240   POTASSIUM mmol/L 3.7   CHLORIDE mmol/L 107   CO2 mmol/L 22   BUN mg/dL 14   CREATININE mg/dL 0.67   CALCIUM mg/dL 8.6   ALK PHOS U/L 69   ALT U/L 25   AST U/L 20             Results from last 7 days   Lab Units 10/03/23  1309   INR  0.96     No results found for: "TROPONINT"  ABG:  Lab Results   Component Value Date    PHART 7.429 03/27/2015    RLM2FDB 38.8 03/27/2015    PO2ART 185.9 (H) 03/27/2015    QAK4OXJ 25 03/27/2015    BEART 0.9 03/27/2015       Imaging Studies: I have personally reviewed pertinent reports. and I have personally reviewed pertinent films in PACS    EKG, Pathology, and Other Studies: I have personally reviewed pertinent reports.
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
## 2023-10-04 NOTE — PROGRESS NOTES
[address] [facility]  Progress Note  Name: Danita Goldstein  MRN: [id number]  Unit/Bed#: -89 I Date of Admission: 10/2/2023   Date of Service: 10/4/2023 I Hospital Day: 1    Assessment/Plan   * Ambulatory dysfunction  Assessment & Plan  10/2- Patient states that he is "paralyzed "and unable to move his right lower extremity. He states that this has been progressively worsening since right rotator cuff surgery in February 2023. He denies other trauma, incidents, inciting events.    -On chart review patient previously saw neurosurgery at [facility] and was recommended tx however declined at that time.   -Saw PCP on 9/27 and stated that he had worsening weakness and incontinence so wanted neurosurgery evaluation. It was noted at that visit that he was not interested in undergoing PT/OT, and wanted home health to assist him.    -Patient saw her neurosurgery on 10/02/2023. 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  -Patient reports that he is unable to live at home by himself as he is wheelchair bound and lives alone. 10/3: Neursurg will proceed with surgery tomorrow. Would like patient to be medically optimized before procedure    Plan:  - Added on for C3-6 laminectomy and fusion today by neurosurgery  - PT/OT consult  - Case management consult      S/P cervical spinal fusion  Assessment & Plan  - 08/07 MRI done:  Since the prior MRI 5/10/2022, the degree of central canal stenosis at C4-5  has slightly decreased and is now mild to moderate. 2. The abnormal T2 intramedullary T2 hyperintensity has progressed and now  xtends from the middle of C3 to the middle of C6.  This could be secondary to   increased compressive myelopathy or cord edema superimposed on chronic compressive myelopathy.   -Patient seen by neurosurgery 10/02 recommended patient get surgery for stenosis and cord compression    Plan:  • Added on for C3-6 laminectomy and fusion today by neurosurgery    Failure to thrive in adult  Assessment & Plan  - Patient presented to the ED via  because he reports he is unable to take care of himself. Patient states that since he is wheelchair-bound, has worsening weakness and lives alone he has been urinating and defecating on himself  -Patient reports that he does not feel safe at home. Per patient  and him have made reports to Department of aging to try to get patient nursing assistant but has been unable to receive those benefits  -Patient reports that because he is unable to move much of his hands, he has been unable to bathe or feed himself    Plan  -We will consult case management    Asthma  Assessment & Plan  -Patient with a history of asthma. Possible COPD component  -On as needed albuterol at home  -Patient noted to be wheezing on exam    Plan  -DuoNebs every 6 as needed      Dyslipidemia  Assessment & Plan    Lab Results   Component Value Date    CHOLESTEROL 173 11/18/2019    TRIG 264 (H) 11/18/2019    HDL 46 11/18/2019    [facility] 92 11/18/2019       Plan:   Continue home atorvastatin 10 mg    GERD without esophagitis  Assessment & Plan  Continue home omeprazole 40 mg daily         Subjective:   No acute events overnight. Pt seen and examined at bedside this AM. Complains of chronic post nasal drip, occasional coughing up clear mucous. Also complains that he wants to sit upright in his chair, but no one has been able to help him. Wants to begin physical therapy as soon as possible. Is ready for laminectomy today. Objective:     Vitals: Blood pressure 127/87, pulse 78, temperature 98.4 °F (36.9 °C), resp. rate 16, SpO2 93 %. ,There is no height or weight on file to calculate BMI.       Intake/Output Summary (Last 24 hours) at 10/4/2023 0730  Last data filed at 10/3/2023 2001  Gross per 24 hour   Intake 465 ml   Output 1150 ml   Net -685 ml       Physical Exam:   Physical Exam  Constitutional:       General: Doroteo Pastor is not in acute distress. Appearance: Doroteo Pastor is not ill-appearing or toxic-appearing. HENT:      Head: Normocephalic and atraumatic. Right Ear: External ear normal.      Left Ear: External ear normal.      Nose: Nose normal.      Mouth/Throat:      Mouth: Mucous membranes are moist.      Pharynx: Oropharynx is clear. Eyes:      General: No scleral icterus. Conjunctiva/sclera: Conjunctivae normal.   Cardiovascular:      Rate and Rhythm: Normal rate and regular rhythm. Heart sounds: Normal heart sounds. No murmur heard. Pulmonary:      Effort: Pulmonary effort is normal. No respiratory distress. Breath sounds: Normal breath sounds. No wheezing, rhonchi or rales. Abdominal:      General: Bowel sounds are normal.      Palpations: Abdomen is soft. Tenderness: There is no abdominal tenderness. There is no guarding. Hernia: No hernia is present. Musculoskeletal:         General: No swelling. Right lower leg: No edema. Left lower leg: No edema. Skin:     General: Skin is warm and dry. Coloration: Skin is not jaundiced. Neurological:      General: No focal deficit present. Mental Status: Doroteo Pastor is alert and oriented to person, place, and time. Mental status is at baseline. Motor: Weakness (2-3/5 strength b/l lower extremities. 5/5 strength b/l upper extremities) present. Psychiatric:         Mood and Affect: Mood normal.         Behavior: Behavior normal.         Invasive Devices     None                            Lab and other studies:  I have personally reviewed pertinent reports.      Admission on 10/02/2023   Component Date Value   • WBC 10/02/2023 8.24    • RBC 10/02/2023 4.87    • Hemoglobin 10/02/2023 15.1    • Hematocrit 10/02/2023 41.3    • MCV 10/02/2023 85    • MCH 10/02/2023 31.0    • MCHC 10/02/2023 36.6    • RDW 10/02/2023 13.1    • MPV 10/02/2023 9.3    • Platelets 46/94/8843 262    • nRBC 10/02/2023 0    • Neutrophils Relative 10/02/2023 65    • Immat GRANS % 10/02/2023 1    • Lymphocytes Relative 10/02/2023 23    • Monocytes Relative 10/02/2023 9    • Eosinophils Relative 10/02/2023 1    • Basophils Relative 10/02/2023 1    • Neutrophils Absolute 10/02/2023 5.50    • Immature Grans Absolute 10/02/2023 0.04    • Lymphocytes Absolute 10/02/2023 1.85    • Monocytes Absolute 10/02/2023 0.70    • Eosinophils Absolute 10/02/2023 0.11    • Basophils Absolute 10/02/2023 0.04    • Sodium 10/02/2023 141    • Potassium 10/02/2023 3.7    • Chloride 10/02/2023 107    • CO2 10/02/2023 22    • ANION GAP 10/02/2023 12    • BUN 10/02/2023 14    • Creatinine 10/02/2023 0.67    • Glucose 10/02/2023 94    • Calcium 10/02/2023 8.6    • AST 10/02/2023 20    • ALT 10/02/2023 25    • Alkaline Phosphatase 10/02/2023 69    • Total Protein 10/02/2023 5.9 (L)    • Albumin 10/02/2023 4.0    • Total Bilirubin 10/02/2023 0.35    • Protime 10/03/2023 12.7    • INR 10/03/2023 0.96        Recent Results (from the past 24 hour(s))   Protime-INR    Collection Time: 10/03/23  1:09 PM   Result Value Ref Range    Protime 12.7 11.6 - 14.5 seconds    INR 0.96 0.84 - 1.19     Blood Culture: No results found for: "BLOODCX",   Urinalysis:   Lab Results   Component Value Date    COLORU Yellow 05/12/2015    CLARITYU Clear 05/12/2015    SPECGRAV 1.015 05/12/2015    PHUR 5.5 05/12/2015    LEUKOCYTESUR Negative 05/12/2015    NITRITE Negative 05/12/2015    PROTEINUA Negative 05/12/2015    GLUCOSEU Negative 05/12/2015    KETONESU Negative 05/12/2015    BILIRUBINUR Negative 05/12/2015    BLOODU Negative 05/12/2015   ,   Urine Culture: No results found for: "Arie Kawasaki",   Wound Culure: No results found for: "WOUNDCULT"      Imaging:  None       VTE Pharmacologic Prophylaxis: Enoxaparin (Lovenox)  VTE Mechanical Prophylaxis: sequential compression device and foot pump applied    Current Facility-Administered Medications   Medication Dose Route Frequency   • acetaminophen (TYLENOL) tablet 975 mg  975 mg Oral Q8H PRN   • benzonatate (TESSALON PERLES) capsule 100 mg  100 mg Oral TID PRN   • enoxaparin (LOVENOX) subcutaneous injection 40 mg  40 mg Subcutaneous Daily   • guaiFENesin (MUCINEX) 12 hr tablet 600 mg  600 mg Oral Q12H FIFI   • hydrOXYzine HCL (ATARAX) tablet 25 mg  25 mg Oral TID PRN   • ipratropium-albuterol (DUO-NEB) 0.5-2.5 mg/3 mL inhalation solution 3 mL  3 mL Nebulization Q6H PRN   • nicotine (NICODERM CQ) 14 mg/24hr TD 24 hr patch 1 patch  1 patch Transdermal Daily   • QUEtiapine (SEROquel) tablet 300 mg  300 mg Oral HS           Diet: regular; NPO pending surgery  Code Status: level 1 full code   Dispo: appreciate PT/OT recommendations for s/p laminectomy    Plan D/W  and [facility] Team      Juliocesar Freeman DO  Family Medicine Resident PGY3

## 2023-10-04 NOTE — CASE MANAGEMENT
Case Management Assessment    Patient name Angus Novel  Location OR POOL/OR POOL MRN [id number]  : 1965 Date 10/4/2023       Current Admission Date: 10/2/2023  Current Admission Diagnosis:Ambulatory dysfunction   Patient Active Problem List    Diagnosis Date Noted   • Failure to thrive in adult 10/03/2023   • Ambulatory dysfunction 2023   • Annual physical exam 2023   • Skin breakdown 2023   • Urinary incontinence 2023   • Fecal incontinence 2023   • Acanthosis nigricans 2019   • Scabies 2019   • Iron deficiency anemia 2018   • Non-healing skin lesion 2018   • Palpitation 2018   • Chronic right-sided low back pain without sciatica 2018   • Tobacco abuse 2018   • Screening for colon cancer 2018   • Abnormal thyroid function test 2018   • Low back pain 2018   • Incomplete tear of right rotator cuff 2017   • S/P cervical spinal fusion 2017   • Neck pain 2017   • HNP (herniated nucleus pulposus) with myelopathy, cervical 2017   • Nicotine dependence 2016   • Dyslipidemia 2016   • Vitamin D deficiency 10/20/2016   • Prepatellar bursitis of right knee 2016   • Chronic pain 2016   • Hypertriglyceridemia 2016   • Marijuana smoker, episodic 2016   • Cervical radiculopathy 2015   • Bipolar disorder, mixed ([address]) 10/20/2014   • GERD without esophagitis 2014   • Asthma 2014      LOS (days): 1  Geometric Mean LOS (GMLOS) (days):   Days to GMLOS:     OBJECTIVE:    Risk of Unplanned Readmission Score: 11.98         Current admission status: Inpatient       Preferred Pharmacy:   [facility] - [address] [facility]  [address]  [address]  Phone: [phone] Fax: [phone]    [address] [place] [address] #[id number] [address] [facility].   [facility] [address]  Phone: [phone] Fax: [phone]    Primary Care Provider: Author Ze DO    Primary Insurance: [facility]  Secondary Insurance:     ASSESSMENT:  Tomi Proxies    There are no active Health Care Proxies on file. Readmission Root Cause  30 Day Readmission: No    Patient Information  Admitted from[de-identified] Home  Mental Status: Alert  During Assessment patient was accompanied by: Not accompanied during assessment  Assessment information provided by[de-identified] Patient  Primary Caregiver: Self  Support Systems: [address] of Residence: [address] [facility] do you live in?: [address] entry access options. Select all that apply.: No steps to enter home  Type of Current Residence: Apartment  Floor Level: 1  Upon entering residence, is there a bathroom on the main floor (no further steps)?: Yes  In the last 12 months, was there a time when you were not able to pay the mortgage or rent on time?: No  In the last 12 months, how many places have you lived?: 1  Homeless/housing insecurity resource given?: N/A  Living Arrangements: Lives Alone    Activities of Daily Living Prior to Admission  Functional Status: Independent  Completes ADLs independently?: Yes  Ambulates independently?: Yes  Does patient use assisted devices?: Yes  Assisted Devices (DME) used:  Wheelchair  Does patient have a history of HHC?: No  Does patient currently have [facility]?: No         Patient Information Continued  Income Source: SSI/SSD  Does patient have prescription coverage?: Yes  Within the past 12 months, you worried that your food would run out before you got the money to buy more.: Never true  Within the past 12 months, the food you bought just didn't last and you didn't have money to get more.: Never true  Food insecurity resource given?: N/A  Does patient receive dialysis treatments?: No  Does patient have a history of substance abuse?: No         Means of Transportation  In the past 12 months, has lack of transportation kept you from medical appointments or from getting medications?: Yes  In the past 12 months, has lack of transportation kept you from meetings, work, or from getting things needed for daily living?: Yes  Was application for public transport provided?: Yes

## 2023-10-04 NOTE — PLAN OF CARE
Problem: Prexisting or High Potential for Compromised Skin Integrity  Goal: Skin integrity is maintained or improved  Description: INTERVENTIONS:  - Identify patients at risk for skin breakdown  - Assess and monitor skin integrity  - Assess and monitor nutrition and hydration status  - Monitor labs   - Assess for incontinence   - Turn and reposition patient  - Assist with mobility/ambulation  - Relieve pressure over bony prominences  - Avoid friction and shearing  - Provide appropriate hygiene as needed including keeping skin clean and dry  - Evaluate need for skin moisturizer/barrier cream  - Collaborate with interdisciplinary team   - Patient/family teaching  - Consider wound care consult   Outcome: Progressing     Problem: MOBILITY - ADULT  Goal: Maintain or return to baseline ADL function  Description: INTERVENTIONS:  -  Assess patient's ability to carry out ADLs; assess patient's baseline for ADL function and identify physical deficits which impact ability to perform ADLs (bathing, care of mouth/teeth, toileting, grooming, dressing, etc.)  - Assess/evaluate cause of self-care deficits   - Assess range of motion  - Assess patient's mobility; develop plan if impaired  - Assess patient's need for assistive devices and provide as appropriate  - Encourage maximum independence but intervene and supervise when necessary  - Involve family in performance of ADLs  - Assess for home care needs following discharge   - Consider OT consult to assist with ADL evaluation and planning for discharge  - Provide patient education as appropriate  Outcome: Progressing  Goal: Maintains/Returns to pre admission functional level  Description: INTERVENTIONS:  - Perform BMAT or MOVE assessment daily.   - Set and communicate daily mobility goal to care team and patient/family/caregiver. - Collaborate with rehabilitation services on mobility goals if consulted  - Perform Range of Motion 3 times a day.   - Reposition patient every 2 hours.  - Dangle patient 3 times a day  - Stand patient 3 times a day  - Ambulate patient 3 times a day  - Out of bed to chair 3 times a day   - Out of bed for meals 3 times a day  - Out of bed for toileting  - Record patient progress and toleration of activity level   Outcome: Progressing     Problem: PAIN - ADULT  Goal: Verbalizes/displays adequate comfort level or baseline comfort level  Description: Interventions:  - Encourage patient to monitor pain and request assistance  - Assess pain using appropriate pain scale  - Administer analgesics based on type and severity of pain and evaluate response  - Implement non-pharmacological measures as appropriate and evaluate response  - Consider cultural and social influences on pain and pain management  - Notify physician/advanced practitioner if interventions unsuccessful or patient reports new pain  Outcome: Progressing     Problem: INFECTION - ADULT  Goal: Absence or prevention of progression during hospitalization  Description: INTERVENTIONS:  - Assess and monitor for signs and symptoms of infection  - Monitor lab/diagnostic results  - Monitor all insertion sites, i.e. indwelling lines, tubes, and drains  - Monitor endotracheal if appropriate and nasal secretions for changes in amount and color  - [place] appropriate cooling/warming therapies per order  - Administer medications as ordered  - Instruct and encourage patient and family to use good hand hygiene technique  - Identify and instruct in appropriate isolation precautions for identified infection/condition  Outcome: Progressing  Goal: Absence of fever/infection during neutropenic period  Description: INTERVENTIONS:  - Monitor WBC    Outcome: Progressing     Problem: SAFETY ADULT  Goal: Maintain or return to baseline ADL function  Description: INTERVENTIONS:  -  Assess patient's ability to carry out ADLs; assess patient's baseline for ADL function and identify physical deficits which impact ability to perform ADLs (bathing, care of mouth/teeth, toileting, grooming, dressing, etc.)  - Assess/evaluate cause of self-care deficits   - Assess range of motion  - Assess patient's mobility; develop plan if impaired  - Assess patient's need for assistive devices and provide as appropriate  - Encourage maximum independence but intervene and supervise when necessary  - Involve family in performance of ADLs  - Assess for home care needs following discharge   - Consider OT consult to assist with ADL evaluation and planning for discharge  - Provide patient education as appropriate  Outcome: Progressing  Goal: Maintains/Returns to pre admission functional level  Description: INTERVENTIONS:  - Perform BMAT or MOVE assessment daily.   - Set and communicate daily mobility goal to care team and patient/family/caregiver. - Collaborate with rehabilitation services on mobility goals if consulted  - Perform Range of Motion 3 times a day. - Reposition patient every 2 hours.   - Dangle patient 3 times a day  - Stand patient 3 times a day  - Ambulate patient 3 times a day  - Out of bed to chair 3 times a day   - Out of bed for meals 3 times a day  - Out of bed for toileting  - Record patient progress and toleration of activity level   Outcome: Progressing  Goal: Patient will remain free of falls  Description: INTERVENTIONS:  - Educate patient/family on patient safety including physical limitations  - Instruct patient to call for assistance with activity   - Consult OT/PT to assist with strengthening/mobility   - Keep Call bell within reach  - Keep bed low and locked with side rails adjusted as appropriate  - Keep care items and personal belongings within reach  - Initiate and maintain comfort rounds  - Make Fall Risk Sign visible to staff  - Offer Toileting every 2 Hours, in advance of need  - Initiate/Maintain alarm  - Obtain necessary fall risk management equipment:   - Apply yellow socks and bracelet for high fall risk patients  - Consider moving patient to room near nurses station  Outcome: Progressing     Problem: DISCHARGE PLANNING  Goal: Discharge to home or other facility with appropriate resources  Description: INTERVENTIONS:  - Identify barriers to discharge w/patient and caregiver  - Arrange for needed discharge resources and transportation as appropriate  - Identify discharge learning needs (meds, wound care, etc.)  - Arrange for interpretive services to assist at discharge as needed  - Refer to Case Management Department for coordinating discharge planning if the patient needs post-hospital services based on physician/advanced practitioner order or complex needs related to functional status, cognitive ability, or social support system  Outcome: Progressing     Problem: Knowledge Deficit  Goal: Patient/family/caregiver demonstrates understanding of disease process, treatment plan, medications, and discharge instructions  Description: Complete learning assessment and assess knowledge base.   Interventions:  - Provide teaching at level of understanding  - Provide teaching via preferred learning methods  Outcome: Progressing

## 2023-10-04 NOTE — UTILIZATION REVIEW
Initial Clinical Review    Admission: Date/Time/Statement:   Admission Orders (From admission, onward)     Ordered        10/03/23 0018  INPATIENT ADMISSION  Once                      Orders Placed This Encounter   Procedures   • INPATIENT ADMISSION     Standing Status:   Standing     Number of Occurrences:   1     Order Specific Question:   Level of Care     Answer:   Med Surg [16]     Order Specific Question:   Estimated length of stay     Answer:   More than 2 Midnights     Order Specific Question:   Certification     Answer:   I certify that inpatient services are medically necessary for this patient for a duration of greater than two midnights. See H&P and MD Progress Notes for additional information about the patient's course of treatment. ED Arrival Information     Expected   10/2/2023     Arrival   10/2/2023 21:41    Acuity   Urgent            Means of arrival   Ambulance    Escorted by   [facility] ([address] [place] [address])    Service   Family Medicine    Admission type   Emergency            Arrival complaint   fall           Chief Complaint   Patient presents with   • Failure To Thrive     Pt is paralyzed in hands and right leg, unable to care for himself; neighbor was too drunk to taker care of him; patient slid off couch an hour ago and urinated himself       Initial Presentation: 66 y.o. adult to ED presents for progressively worsening weakness, urinary/fecal incontinence and Inability to care for herself. Pt and on 2/2023 patient has had progressively worsening weakness and urinary/fecal incontinence. Also states  that he slid off the couch today and was unable to get himself back on so he called  bring him to the hospital. He was seen by Neurosurgery on 1/2. 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  PM for cervical radiculopathy, herniated nuclear polyposis, bipolar disorder, urinary and fecal incontinence and ambulatory dysfunction. Admit Inpatient level of care for Failure to thrive, Ambulatory dysfunction. Neurosurgery consult. Continue home meds. DuoNebs q6h prn. On exam; wheezing. Pt noted to have dirt on fingernails, toenails and skin. Musculoskeletal: 3/5 LUE, 3/5 RLE, 2/5 RUE and 1/5 RLE. 10/3  Neurosurgery cons; Seen in clinic yesterday for 6-month history of progressively worsening neck pain, bilateral upper extremity and bilateral lower extremity weakness, bilateral upper extremity and bilateral lower extremity numbness, bowel and bladder incontinence, gait instability, and his MRI cervical spine showed postoperative changes at C5-6 from prior ACDF performed in 2017, but also C4-5 grade 1 spondylolisthesis, C4-5 disc herniation, and C4-5 ligamentum hypertrophy causing cord compression at this level.  He also has C5-6 ligamentum hypertrophy causing cervical stenosis at this level as well.  He also has STIR signal change at the C4-5 facets bilaterally/facet effusions.  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 Offered him a posterior approach cervical fusion from C3-C6 with a C4-5 laminectomy with undercutting of C3 and undercutting of C6. He presented to the ED overnight because he fell at home and was unable to get up due to his myelopathy and weakness. Plan for OR tomorrow 10/4; Added on for C3-6 laminectomy. NPO at [place]. XR cervical spine flex ex. INR. Medicine preop clearance. Date: 10/4  Day 2:   Progress notes; Plan for OR today. Need to be medically optimized before procedure. Per Neurosurgery; Plan for OR today. NPO. 10/4 OR - S/p 1. C3-6 posterior cervical fusion with autograft and allograft  2. C3-6 posterior instrumentation with lateral mass screws  3. C4-5 laminectomy  4. Use of intraoperative neuromonitoring  5. Use of intraoperative fluoroscopy    ED Triage Vitals [10/02/23 2156]   Temperature Pulse Respirations Blood Pressure SpO2   98.5 °F (36.9 °C) 81 18 109/74 94 %      Temp Source Heart Rate Source Patient Position - Orthostatic VS BP Location FiO2 (%)   Oral Monitor Lying Right arm --      Pain Score       9          Wt Readings from Last 1 Encounters:   09/27/23 63.5 kg (140 lb)     Additional Vital Signs:   10/04/23 07:29:08 98.4 °F (36.9 °C) 78 16 127/87 100 93 % -- --   10/03/23 22:00:29 97.9 °F (36.6 °C) 96 -- 139/94 109 90 % -- --   10/03/23 22:00:16 97.9 °F (36.6 °C) 93 -- 139/94 109 91 % -- --   10/03/23 1557 -- -- -- -- -- 96 % -- --   10/03/23 15:33:47 98.2 °F (36.8 °C) 84 18 134/85 101 94 % -- --   10/03/23 0919 -- -- -- -- -- -- None (Room air) --   10/03/23 09:13:44 97.3 °F (36.3 °C)   Abnormal  88 18 134/86 102 94 % --      Pertinent Labs/Diagnostic Test Results:   XR spine cervical complete 6+ vw flex/ext/obl   Final Result by Christo Reddy MD (10/04 1038)      Stable examination status post anterior fusion at C5-C6. Workstation performed: [id number]         XR chest 1 view portable   ED Interpretation by Radha Tarango MD (10/02 3396)   No lobar pneumonia      Final Result by Melissa Barrera DO (10/03 1330)      No acute cardiopulmonary disease.                   Workstation performed: [id number]               Results from last 7 days   Lab Units 10/02/23  2240   WBC Thousand/uL 8.24   HEMOGLOBIN g/dL 15.1   HEMATOCRIT % 41.3   PLATELETS Thousands/uL 262   NEUTROS ABS Thousands/µL 5.50         Results from last 7 days   Lab Units 10/02/23  2240   SODIUM mmol/L 141   POTASSIUM mmol/L 3.7   CHLORIDE mmol/L 107   CO2 mmol/L 22   ANION GAP mmol/L 12   BUN mg/dL 14   CREATININE mg/dL 0.67   CALCIUM mg/dL 8.6     Results from last 7 days   Lab Units 10/02/23  2240   AST U/L 20   ALT U/L 25   ALK PHOS U/L 69   TOTAL PROTEIN g/dL 5.9*   ALBUMIN g/dL 4.0   TOTAL BILIRUBIN mg/dL 0.35         Results from last 7 days   Lab Units 10/02/23  2240   GLUCOSE RANDOM mg/dL 94       Results from last 7 days   Lab Units 10/03/23  1309   PROTIME seconds 12.7   INR  0.96       ED Treatment:   Medication Administration from 10/02/2023 2141 to 10/03/2023 5879       Date/Time Order Dose Route Action     10/02/2023 2321 EDT QUEtiapine (SEROquel) tablet 300 mg 300 mg Oral Given     10/03/2023 0134 EDT ipratropium (ATROVENT) 0.02 % inhalation solution 0.5 mg 0.5 mg Nebulization Given     10/03/2023 0134 EDT levalbuterol ([place]) inhalation solution 1.25 mg 1.25 mg Nebulization Given     10/03/2023 0303 EDT hydrOXYzine HCL (ATARAX) tablet 25 mg 25 mg Oral Given        Past Medical History:   Diagnosis Date   • Anemia    • Anxiety    • Arthritis    • Asthma    • Dermatitis of foot     Last Assessed 1/18/2016   • Edentulous    • Fracture of fibula     Distal; Last Assessed 7/11/2014   • GERD (gastroesophageal reflux disease)    • GERD (gastroesophageal reflux disease)    • History of fusion of cervical spine    • Hyperlipidemia    • Low back pain     states on prednisone for a  few days for back   • Marijuana smoker, episodic 2/17/2016   • Moderate persistent asthma     Last Assessed 7/3/2014   • Neurosis, anxiety, panic type     Resolved 8/20/2015   • Prepatellar bursitis, right knee    • Smoker    • Tinnitus    • Tobacco abuse     Last Assessed 1/18/2016   • Wears glasses     reading     Present on Admission:  • Ambulatory dysfunction  • GERD without esophagitis  • Asthma  • Dyslipidemia      Admitting Diagnosis: Cervical radiculopathy [M54.12]  Failure to thrive in adult [R62.7]  Fall, initial encounter [W19. XXXA]  Age/Sex: 66 y.o. adult     Admission Orders:  Scheduled Medications:  guaiFENesin, 600 mg, Oral, Q12H [address]  nicotine, 1 patch, Transdermal, Daily  QUEtiapine, 300 mg, Oral, HS      Continuous IV Infusions: None     PRN Meds:  acetaminophen, 975 mg, Oral, Q8H PRN 10/3 x2, 10/4 x1  albuterol, 2.5 mg, Nebulization, Q4H PRN  benzonatate, 100 mg, Oral, TID PRN  hydrOXYzine HCL, 25 mg, Oral, TID PRN 10/3 x2, 10/4 x1        IP CONSULT TO CASE MANAGEMENT  IP CONSULT TO NEUROSURGERY    Network Utilization Review Department  ATTENTION: Please call with any questions or concerns to [phone] and carefully listen to the prompts so that you are directed to the right person. All voicemails are confidential.   For Discharge needs, contact Care Management DC Support Team at [phone] opt. 2  Send all requests for admission clinical reviews, approved or denied determinations and any other requests to dedicated fax number below belonging to the campus where the patient is receiving treatment.  List of dedicated fax numbers for the Facilities:  [place] DENIALS (Administrative/Medical Necessity) [phone]   DISCHARGE SUPPORT TEAM (NETWORK) [address] [facility] (Maternity/NICU/Pediatrics) [phone]   [address] [place] [address] Drive [address] [place] [address] [address] [facility] [phone]   [address] [facility] [address] [address] [facility] [address] [facility] [address] [place] [address] Nn [phone]

## 2023-10-04 NOTE — ANESTHESIA PROCEDURE NOTES
Arterial Line Insertion    Performed by: Bonny Carter MD  Authorized by: Bonny Carter MD  Consent: Verbal consent obtained. Preparation: Patient was prepped and draped in the usual sterile fashion.   Indications: hemodynamic monitoring  Orientation:  Left  Location: radial artery  Procedure Details:  Needle gauge: 20  Number of attempts: 1    Post-procedure:  Post-procedure: dressing applied  Patient tolerance: Patient tolerated the procedure well with no immediate complications

## 2023-10-04 NOTE — ANESTHESIA POSTPROCEDURE EVALUATION
Post-Op Assessment Note    CV Status:  Stable  Pain Score: 0    Pain management: adequate     Mental Status:  Alert and awake   Hydration Status:  Euvolemic   PONV Controlled:  Controlled   Airway Patency:  Patent      Post Op Vitals Reviewed: Yes      Staff: CRNA         No notable events documented.     BP   139/100   Temp  96.6   Pulse 88 (10/04/23 1845)   Resp   22   SpO2 98 % (10/04/23 1845)

## 2023-10-04 NOTE — PLAN OF CARE
Problem: Prexisting or High Potential for Compromised Skin Integrity  Goal: Skin integrity is maintained or improved  Description: INTERVENTIONS:  - Identify patients at risk for skin breakdown  - Assess and monitor skin integrity  - Assess and monitor nutrition and hydration status  - Monitor labs   - Assess for incontinence   - Turn and reposition patient  - Assist with mobility/ambulation  - Relieve pressure over bony prominences  - Avoid friction and shearing  - Provide appropriate hygiene as needed including keeping skin clean and dry  - Evaluate need for skin moisturizer/barrier cream  - Collaborate with interdisciplinary team   - Patient/family teaching  - Consider wound care consult   Outcome: Progressing     Problem: MOBILITY - ADULT  Goal: Maintain or return to baseline ADL function  Description: INTERVENTIONS:  -  Assess patient's ability to carry out ADLs; assess patient's baseline for ADL function and identify physical deficits which impact ability to perform ADLs (bathing, care of mouth/teeth, toileting, grooming, dressing, etc.)  - Assess/evaluate cause of self-care deficits   - Assess range of motion  - Assess patient's mobility; develop plan if impaired  - Assess patient's need for assistive devices and provide as appropriate  - Encourage maximum independence but intervene and supervise when necessary  - Involve family in performance of ADLs  - Assess for home care needs following discharge   - Consider OT consult to assist with ADL evaluation and planning for discharge  - Provide patient education as appropriate  Outcome: Progressing  Goal: Maintains/Returns to pre admission functional level  Description: INTERVENTIONS:  - Perform BMAT or MOVE assessment daily.   - Set and communicate daily mobility goal to care team and patient/family/caregiver.    - Collaborate with rehabilitation services on mobility goals if consulted  - Out of bed for toileting  - Record patient progress and toleration of activity level   Outcome: Progressing     Problem: PAIN - ADULT  Goal: Verbalizes/displays adequate comfort level or baseline comfort level  Description: Interventions:  - Encourage patient to monitor pain and request assistance  - Assess pain using appropriate pain scale  - Administer analgesics based on type and severity of pain and evaluate response  - Implement non-pharmacological measures as appropriate and evaluate response  - Consider cultural and social influences on pain and pain management  - Notify physician/advanced practitioner if interventions unsuccessful or patient reports new pain  Outcome: Progressing     Problem: INFECTION - ADULT  Goal: Absence or prevention of progression during hospitalization  Description: INTERVENTIONS:  - Assess and monitor for signs and symptoms of infection  - Monitor lab/diagnostic results  - Monitor all insertion sites, i.e. indwelling lines, tubes, and drains  - Monitor endotracheal if appropriate and nasal secretions for changes in amount and color  - [place] appropriate cooling/warming therapies per order  - Administer medications as ordered  - Instruct and encourage patient and family to use good hand hygiene technique  - Identify and instruct in appropriate isolation precautions for identified infection/condition  Outcome: Progressing  Goal: Absence of fever/infection during neutropenic period  Description: INTERVENTIONS:  - Monitor WBC    Outcome: Progressing     Problem: SAFETY ADULT  Goal: Maintain or return to baseline ADL function  Description: INTERVENTIONS:  -  Assess patient's ability to carry out ADLs; assess patient's baseline for ADL function and identify physical deficits which impact ability to perform ADLs (bathing, care of mouth/teeth, toileting, grooming, dressing, etc.)  - Assess/evaluate cause of self-care deficits   - Assess range of motion  - Assess patient's mobility; develop plan if impaired  - Assess patient's need for assistive devices and provide as appropriate  - Encourage maximum independence but intervene and supervise when necessary  - Involve family in performance of ADLs  - Assess for home care needs following discharge   - Consider OT consult to assist with ADL evaluation and planning for discharge  - Provide patient education as appropriate  Outcome: Progressing  Goal: Maintains/Returns to pre admission functional level  Description: INTERVENTIONS:  - Perform BMAT or MOVE assessment daily.   - Set and communicate daily mobility goal to care team and patient/family/caregiver.    - Collaborate with rehabilitation services on mobility goals if consulted  - Out of bed for toileting  - Record patient progress and toleration of activity level   Outcome: Progressing  Goal: Patient will remain free of falls  Description: INTERVENTIONS:  - Educate patient/family on patient safety including physical limitations  - Instruct patient to call for assistance with activity   - Consult OT/PT to assist with strengthening/mobility   - Keep Call bell within reach  - Keep bed low and locked with side rails adjusted as appropriate  - Keep care items and personal belongings within reach  - Initiate and maintain comfort rounds  - Make Fall Risk Sign visible to staff  - Apply yellow socks and bracelet for high fall risk patients  - Consider moving patient to room near nurses station  Outcome: Progressing     Problem: DISCHARGE PLANNING  Goal: Discharge to home or other facility with appropriate resources  Description: INTERVENTIONS:  - Identify barriers to discharge w/patient and caregiver  - Arrange for needed discharge resources and transportation as appropriate  - Identify discharge learning needs (meds, wound care, etc.)  - Arrange for interpretive services to assist at discharge as needed  - Refer to Case Management Department for coordinating discharge planning if the patient needs post-hospital services based on physician/advanced practitioner order or complex needs related to functional status, cognitive ability, or social support system  Outcome: Progressing     Problem: Knowledge Deficit  Goal: Patient/family/caregiver demonstrates understanding of disease process, treatment plan, medications, and discharge instructions  Description: Complete learning assessment and assess knowledge base.   Interventions:  - Provide teaching at level of understanding  - Provide teaching via preferred learning methods  Outcome: Progressing

## 2023-10-04 NOTE — OP NOTE
OPERATIVE REPORT  PATIENT NAME: Jaxon Nelson    :  1965  MRN: [id number]  Pt Location: BE OR ROOM 17    SURGERY DATE: 10/4/2023    Surgeon(s) and Role:     * Baljit Mcduffie MD - Primary    Preop Diagnosis:  Cervical myelopathy ([address]) [G95.9]    Post-Op Diagnosis Codes:     * Cervical myelopathy ([address]) [G95.9]    Procedure(s):  1. C3-6 posterior cervical fusion with autograft and allograft  2. C3-6 posterior instrumentation with lateral mass screws  3. C4-5 laminectomy  4. Use of intraoperative neuromonitoring  5. Use of intraoperative fluoroscopy    Specimen(s):  * No specimens in log *    Estimated Blood Loss:   300    Drains:  Closed/Suction Drain Superior;Right Back Bulb 15 Fr. (Active)   Number of days: 0       Urethral Catheter Latex 16 Fr. (Active)   Number of days: 0       Anesthesia Type:   General    Operative Indications:  Cervical myelopathy ([address]) [C74.9]      Complications:   None    Indications and consent:  Mr. Vale Marroquin is a 17-year-old male with a history of asthma, tobacco use, bipolar who presented to [facility]. [facility] with a 6-month progressively worsening history of cervical myelopathy. Prior to 6 months ago he stated that he was walking and bike riding. However over the course of 6 months, he developed progressively worsening neck pain, numbness and weakness in his bilateral upper and lower extremities, and urinary incontinence. He stated that he was largely confined to a wheelchair and unable to do any activities that he previously had done over 6 months ago. He had a previous C5-6 ACDF approximately 5 to 6 years ago and had done well from that regard. He had an MRI cervical spine which showed adjacent segment disease primarily at C4-5 with spondylolisthesis and ligamentum hypertrophy causing cord compression. He underwent an x-ray flex ex that demonstrated significant mobility at C4-5. I recommended a C3-6 posterior decompression and fusion for treatment.   I discussed risks and benefits along with alternatives all questions were answered he agreed to proceed and consent was obtained. Procedure and Technique:  Patient was brought back to main operating room and general endotracheal anesthesia was induced. Preoperative antibiotics given were Ancef. His head was placed in a Douglas head clamp to 60 pounds pressure. He was placed prone on the operating room table and all pressure points were properly padded. Posterior cervical linear incision was planned over the C3-C6 levels prepped and draped in usual sterile fashion. Incision was opened using 10 blade scalpel dissection was carried down to the fascia using monopolar electrocautery. Fascia was divided using monopolar electrocautery and the midline avascular plane was followed down to the level of the C3-C6 spinous processes. The paraspinal muscles were retracted using self-retaining retractors laterally to make operative field. Intraoperative fluoroscopy was used to confirm the C3-C6 levels. The lateral mass screws were placed first using an M8 drill bit to place the initial  hole and then a 14 mm power drill to place the tract angled in the superior lateral direction. Soft ball probe was used to palpate the tract for breaches. The tract was tapped to the appropriate depth and then the appropriately sized lateral mass screws were placed. Once the screws were placed we took fluoroscopy shots to indicate adequate placement. We placed final rods and final tightened. The M8 drill bit was used to drill troughs along the C4 and C5 lamina. The Kerrison rongeur was used to remove the ligaments and then the lamina at C4 and C5 were removed and a lobster tail fashion. Kerrison rongeurs were used to remove remaining ligaments and the lateral gutters. There was a significant amount of granulomatous inflammatory ligamentous tissue posteriorly primarily at C4-5 and this was well decompressed.   Once were happy with decompression, hemostasis was achieved using bipolar electrocautery and Floseal.  There were no changes in neuro monitoring throughout the case. We placed a postoperative drain and irrigated the wound with large amount sterile saline. The lateral masses were decorticated using the M8 drill bit and allograft plus autograft was placed along the lateral masses for the fusion bed. We closed fascia with 0 Vicryl sutures. We closed the dermis with inverted 2-0 Vicryl sutures. We closed the skin with staples. After procedure was done, was performed and all sponge instrument needle counts were verified to be correct patient awoke from general anesthesia and was transported to PACU in stable condition. I was present for the entirety of the case.     Patient Disposition:  PACU         SIGNATURE: Neha Tenorio MD  DATE: October 4, 2023  TIME: 6:33 PM

## 2023-10-04 NOTE — PHYSICAL THERAPY NOTE
PHYSICAL THERAPY NOTE          Patient Name: Neetu VERDIN Date: 10/4/2023       10/04/23 0805   PT Last Visit   PT Visit Date 10/04/23   Note Type   Note type Evaluation; Cancelled Session   Cancel Reasons Patient to operating room       PT orders received and chart was reviewed. Pt currently pending OR for C3-6 laminectomy and fusion. Will continue to follow on caseload and see s/p procedure when appropriate.     Jodi Avendaño, PT, DPT

## 2023-10-05 VITALS
OXYGEN SATURATION: 92 % | SYSTOLIC BLOOD PRESSURE: 140 MMHG | BODY MASS INDEX: 24.41 KG/M2 | TEMPERATURE: 98.8 F | HEART RATE: 92 BPM | DIASTOLIC BLOOD PRESSURE: 83 MMHG | RESPIRATION RATE: 18 BRPM | WEIGHT: 137.79 LBS

## 2023-10-05 DIAGNOSIS — Z78.9 NEED FOR FOLLOW-UP BY SOCIAL WORKER: Primary | ICD-10-CM

## 2023-10-05 PROBLEM — M47.12 CERVICAL SPONDYLOSIS WITH MYELOPATHY: Status: ACTIVE | Noted: 2023-10-05

## 2023-10-05 LAB
ALBUMIN SERPL BCP-MCNC: 3.8 G/DL (ref 3.5–5)
ALP SERPL-CCNC: 75 U/L (ref 34–104)
ALT SERPL W P-5'-P-CCNC: 17 U/L (ref 7–52)
ANION GAP SERPL CALCULATED.3IONS-SCNC: 12 MMOL/L
ANION GAP SERPL CALCULATED.3IONS-SCNC: 9 MMOL/L
AST SERPL W P-5'-P-CCNC: 21 U/L (ref 5–45)
ATRIAL RATE: 116 BPM
ATRIAL RATE: 117 BPM
BILIRUB SERPL-MCNC: 0.88 MG/DL (ref 0.2–1)
BUN SERPL-MCNC: 11 MG/DL (ref 5–25)
BUN SERPL-MCNC: 9 MG/DL (ref 5–25)
CALCIUM SERPL-MCNC: 8.4 MG/DL (ref 8.4–10.2)
CALCIUM SERPL-MCNC: 9.1 MG/DL (ref 8.4–10.2)
CHLORIDE SERPL-SCNC: 101 MMOL/L (ref 96–108)
CHLORIDE SERPL-SCNC: 107 MMOL/L (ref 96–108)
CO2 SERPL-SCNC: 21 MMOL/L (ref 21–32)
CO2 SERPL-SCNC: 22 MMOL/L (ref 21–32)
CREAT SERPL-MCNC: 0.61 MG/DL (ref 0.6–1.3)
CREAT SERPL-MCNC: 0.72 MG/DL (ref 0.6–1.3)
ERYTHROCYTE [DISTWIDTH] IN BLOOD BY AUTOMATED COUNT: 12.9 % (ref 11.6–15.1)
ERYTHROCYTE [DISTWIDTH] IN BLOOD BY AUTOMATED COUNT: 13 % (ref 11.6–15.1)
GLUCOSE SERPL-MCNC: 116 MG/DL (ref 65–140)
GLUCOSE SERPL-MCNC: 147 MG/DL (ref 65–140)
HCT VFR BLD AUTO: 42.4 % (ref 36.5–46.1)
HCT VFR BLD AUTO: 45.9 % (ref 36.5–46.1)
HGB BLD-MCNC: 14.8 G/DL (ref 12–15.4)
HGB BLD-MCNC: 16.2 G/DL (ref 12–15.4)
MAGNESIUM SERPL-MCNC: 1.6 MG/DL (ref 1.9–2.7)
MCH RBC QN AUTO: 29.8 PG (ref 26.8–34.3)
MCH RBC QN AUTO: 30.2 PG (ref 26.8–34.3)
MCHC RBC AUTO-ENTMCNC: 34.9 G/DL (ref 31.4–37.4)
MCHC RBC AUTO-ENTMCNC: 35.3 G/DL (ref 31.4–37.4)
MCV RBC AUTO: 85 FL (ref 82–98)
MCV RBC AUTO: 86 FL (ref 82–98)
P AXIS: 63 DEGREES
P AXIS: 68 DEGREES
PHOSPHATE SERPL-MCNC: 3.5 MG/DL (ref 2.7–4.5)
PLATELET # BLD AUTO: 263 THOUSANDS/UL (ref 149–390)
PLATELET # BLD AUTO: 276 THOUSANDS/UL (ref 149–390)
PMV BLD AUTO: 10 FL (ref 8.9–12.7)
PMV BLD AUTO: 9.9 FL (ref 8.9–12.7)
POTASSIUM SERPL-SCNC: 3.7 MMOL/L (ref 3.5–5.3)
POTASSIUM SERPL-SCNC: 4.2 MMOL/L (ref 3.5–5.3)
PR INTERVAL: 134 MS
PR INTERVAL: 142 MS
PROT SERPL-MCNC: 6 G/DL (ref 6.4–8.4)
QRS AXIS: 66 DEGREES
QRS AXIS: 67 DEGREES
QRSD INTERVAL: 70 MS
QRSD INTERVAL: 78 MS
QT INTERVAL: 302 MS
QT INTERVAL: 304 MS
QTC INTERVAL: 419 MS
QTC INTERVAL: 424 MS
RBC # BLD AUTO: 4.97 MILLION/UL (ref 3.88–5.12)
RBC # BLD AUTO: 5.37 MILLION/UL (ref 3.88–5.12)
SODIUM SERPL-SCNC: 135 MMOL/L (ref 135–147)
SODIUM SERPL-SCNC: 137 MMOL/L (ref 135–147)
T WAVE AXIS: 72 DEGREES
T WAVE AXIS: 73 DEGREES
VENTRICULAR RATE: 116 BPM
VENTRICULAR RATE: 117 BPM
WBC # BLD AUTO: 16.08 THOUSAND/UL (ref 4.31–10.16)
WBC # BLD AUTO: 17.48 THOUSAND/UL (ref 4.31–10.16)

## 2023-10-05 PROCEDURE — 80053 COMPREHEN METABOLIC PANEL: CPT | Performed by: PHYSICIAN ASSISTANT

## 2023-10-05 PROCEDURE — 93005 ELECTROCARDIOGRAM TRACING: CPT

## 2023-10-05 PROCEDURE — 93010 ELECTROCARDIOGRAM REPORT: CPT | Performed by: INTERNAL MEDICINE

## 2023-10-05 PROCEDURE — 85027 COMPLETE CBC AUTOMATED: CPT | Performed by: PHYSICIAN ASSISTANT

## 2023-10-05 PROCEDURE — 97163 PT EVAL HIGH COMPLEX 45 MIN: CPT

## 2023-10-05 PROCEDURE — 94664 DEMO&/EVAL PT USE INHALER: CPT

## 2023-10-05 PROCEDURE — 80048 BASIC METABOLIC PNL TOTAL CA: CPT

## 2023-10-05 PROCEDURE — 85027 COMPLETE CBC AUTOMATED: CPT

## 2023-10-05 PROCEDURE — 99238 HOSP IP/OBS DSCHRG MGMT 30/<: CPT | Performed by: FAMILY MEDICINE

## 2023-10-05 PROCEDURE — 97167 OT EVAL HIGH COMPLEX 60 MIN: CPT

## 2023-10-05 PROCEDURE — 83735 ASSAY OF MAGNESIUM: CPT

## 2023-10-05 PROCEDURE — 99024 POSTOP FOLLOW-UP VISIT: CPT | Performed by: STUDENT IN AN ORGANIZED HEALTH CARE EDUCATION/TRAINING PROGRAM

## 2023-10-05 PROCEDURE — 84100 ASSAY OF PHOSPHORUS: CPT

## 2023-10-05 RX ORDER — LANOLIN ALCOHOL/MO/W.PET/CERES
400 CREAM (GRAM) TOPICAL ONCE
Status: COMPLETED | OUTPATIENT
Start: 2023-10-05 | End: 2023-10-05

## 2023-10-05 RX ADMIN — DOCUSATE SODIUM 100 MG: 100 CAPSULE, LIQUID FILLED ORAL at 08:32

## 2023-10-05 RX ADMIN — NICOTINE 1 PATCH: 14 PATCH, EXTENDED RELEASE TRANSDERMAL at 08:33

## 2023-10-05 RX ADMIN — CYCLOBENZAPRINE HYDROCHLORIDE 10 MG: 10 TABLET, FILM COATED ORAL at 08:32

## 2023-10-05 RX ADMIN — SENNOSIDES 8.6 MG: 8.6 TABLET, FILM COATED ORAL at 08:32

## 2023-10-05 RX ADMIN — LIDOCAINE 2 PATCH: 50 PATCH CUTANEOUS at 08:33

## 2023-10-05 RX ADMIN — MAGNESIUM OXIDE TAB 400 MG (241.3 MG ELEMENTAL MG) 400 MG: 400 (241.3 MG) TAB at 08:32

## 2023-10-05 RX ADMIN — METHOCARBAMOL 750 MG: 750 TABLET ORAL at 04:32

## 2023-10-05 RX ADMIN — ACETAMINOPHEN 975 MG: 325 TABLET, FILM COATED ORAL at 04:32

## 2023-10-05 RX ADMIN — GUAIFENESIN 600 MG: 600 TABLET, EXTENDED RELEASE ORAL at 08:32

## 2023-10-05 RX ADMIN — CEFAZOLIN SODIUM 1000 MG: 1 SOLUTION INTRAVENOUS at 08:33

## 2023-10-05 NOTE — PHYSICAL THERAPY NOTE
Physical Therapy Evaluation     Patient's Name: Ismael Salcido    Admitting Diagnosis  Cervical radiculopathy [M54.12]  Failure to thrive in adult [R62.7]  Fall, initial encounter [W19. XXXA]    Problem List  Patient Active Problem List   Diagnosis    Prepatellar bursitis of right knee    Tobacco abuse    GERD without esophagitis    Incomplete tear of right rotator cuff    Low back pain    Neck pain    Nicotine dependence    S/P cervical spinal fusion    Vitamin D deficiency    Hypertriglyceridemia    HNP (herniated nucleus pulposus) with myelopathy, cervical    Dyslipidemia    Cervical radiculopathy    Bipolar disorder, mixed ([address])    Asthma    Screening for colon cancer    Abnormal thyroid function test    Chronic right-sided low back pain without sciatica    Palpitation    Marijuana smoker, episodic    Chronic pain    Iron deficiency anemia    Non-healing skin lesion    Acanthosis nigricans    Scabies    Ambulatory dysfunction    Annual physical exam    Skin breakdown    Urinary incontinence    Fecal incontinence    Failure to thrive in adult    Cervical spondylosis with myelopathy       Past Medical History  Past Medical History:   Diagnosis Date    Anemia     Anxiety     Arthritis     Asthma     Dermatitis of foot     Last Assessed 1/18/2016    Edentulous     Fracture of fibula     Distal; Last Assessed 7/11/2014    GERD (gastroesophageal reflux disease)     GERD (gastroesophageal reflux disease)     History of fusion of cervical spine     Hyperlipidemia     Low back pain     states on prednisone for a  few days for back    Marijuana smoker, episodic 2/17/2016    Moderate persistent asthma     Last Assessed 7/3/2014    Neurosis, anxiety, panic type     Resolved 8/20/2015    Prepatellar bursitis, right knee     Smoker     Tinnitus     Tobacco abuse     Last Assessed 1/18/2016    Wears glasses     reading       Past Surgical History  Past Surgical History:   Procedure Laterality Date    CERVICAL FUSION NECK SURGERY      Last Assessed 5/02/2017    TN EXCISION PREPATELLAR BURSA Right 3/8/2018    Procedure: EXCISION BURSA PREPATELLAR;  Surgeon: Eric Johnson MD;  Location: [facility];  Service: Orthopedics    TN SURGICAL ARTHROSCOPY SHOULDER W/ROTATOR CUFF RPR Right 7/24/2017    Procedure: ARTHROSCOPY SHOULDER, ROTATOR CUFF REPAIR,OPEN BICEP TENODESIS;  Surgeon: Eric Johnson MD;  Location: [facility];  Service: Orthopedics    TOOTH EXTRACTION            10/05/23 0854   PT Last Visit   PT Visit Date 10/05/23   Note Type   Note type Evaluation   Pain Assessment   Pain Assessment Tool 0-10   Pain Score 2   Pain Location/Orientation Location: Neck   Pain Onset/Description Onset: Ongoing;Frequency: Intermittent; Descriptor: Sore   Effect of Pain on Daily Activities states no limits on mobility, states he does not want pain medication   Patient's Stated Pain Goal No pain   Hospital Pain Intervention(s) Repositioned; Ambulation/increased activity; Emotional support   Restrictions/Precautions   Weight Bearing Precautions Per Order No   Braces or Orthoses C/S Collar  (to be worn with ambulation)   Other Precautions Impulsive;Cognitive; Chair Alarm; Bed Alarm;Multiple lines; Fall Risk;Pain;Spinal precautions   Home Living   Type of Home Apartment   Home Layout One level; [employer] Grab bars in shower;Grab bars around toilet   [address]; [facility]  (states has been using w/c recently due to weakness and difficulty with mobility)   Prior Function   Level of [place] Independent with ADLs; Independent with functional mobility; Independent with IADLS  (has assist for paying bills and computer work)   Lives With (S)  [address] [place] [address]  Help From   (someone "from the state" that comes to assist with paying bills and computer work)   IADLs Independent with medication management; Independent with meal prep; Independent with driving   Comments patient easily distracted, difficulty answering home set up and PLOF questions d/t repeating need to go home because of insurance. General   Family/Caregiver Present No   Cognition   Overall Cognitive Status (S)  Impaired   Arousal/Participation Alert   Orientation Level Oriented X4   Memory Decreased recall of precautions   Following Commands Follows one step commands inconsistently   Comments patient easily distracted and notes frustrations about insurance, difficult to redirect. very impulsive. cues needed throughout for safety. Poor insight into deficits   Subjective   Subjective Patient agreeable to PT eval   RUE Assessment   RUE Assessment WFL   LUE Assessment   LUE Assessment WFL   RLE Assessment   RLE Assessment   (4/5 grossly)   LLE Assessment   LLE Assessment   (4/5 grossly)   Light Touch   RLE Light Touch Grossly intact   LLE Light Touch Grossly intact   Bed Mobility   Supine to Sit 5  Supervision   Additional items Impulsive;Verbal cues   Sit to Supine Unable to assess   Transfers   Sit to Stand 4  Minimal assistance   Additional items Assist x 1; Impulsive;Verbal cues   Stand to Sit 4  Minimal assistance   Additional items Assist x 1; Impulsive;Verbal cues   Additional Comments RW ; very unsteady   Ambulation/Elevation   Gait pattern Improper Weight shift; Forward Flexion;Decreased foot clearance;Shuffling; Steppage; Inconsistent robin; Ataxia   Gait Assistance 3  Moderate assist   Additional items Assist x 1;Verbal cues   Assistive Device Rolling walker   Distance 40'x2   Stair Management Assistance Not tested   Ambulation/Elevation Additional Comments very unsteady gait pattern, not receptive to safety education. Cues to decrease speed and maintain all 4 points of contact of RW on ground at all times. Poor insight into deficits.    Balance   Static Sitting Fair   Dynamic Sitting Fair -   Static Standing Poor +   Dynamic Standing Poor   Ambulatory Poor -   Endurance Deficit   Endurance Deficit Yes   Activity Tolerance Activity Tolerance Other (Comment)  (cog)   Medical Staff Made Aware OT   Nurse Made Aware RN cleared   Assessment   Prognosis Good   Problem List Decreased strength;Decreased endurance; Impaired balance;Decreased mobility; Decreased coordination;Decreased cognition; Impaired judgement;Decreased safety awareness;Pain   Assessment Pt is a 66 y.o. choose not to disclose seen for a high complexity PT evaluation due to Ongoing medical management for primary dx, Increased reliance on more restrictive AD compared to baseline, Decreased activity tolerance compared to baseline, Fall risk, Increased assistance needed from caregiver at current time, Cog status, PACO drain in place at current time, s/p surgical intervention. Patient is s/p admit to [address] [place] [address] on 10/2/2023 for Cervical radiculopathy (M54.12)  Failure to thrive in adult (R62.7)  Fall, initial encounter (F39.PQOP). Patient  has a past medical history of Anemia, Anxiety, Arthritis, Asthma, Dermatitis of foot, Edentulous, Fracture of fibula, GERD (gastroesophageal reflux disease), GERD (gastroesophageal reflux disease), History of fusion of cervical spine, Hyperlipidemia, Low back pain, Marijuana smoker, episodic, Moderate persistent asthma, Neurosis, anxiety, panic type, Prepatellar bursitis, right knee, Smoker, Tinnitus, Tobacco abuse, and Wears glasses. .     PT now consulted to assess functional mobility and needs for safe d/c planning. Prior to admission, pt was I with functional mobility, ADLs, and IADLs. However has been having increased difficulty with these tasks more recently d/t symptoms. Personal factors affecting status include home alone in 1lvl apt with 0STE, impulsivity, cog status, spinal precautions, medical status. Currently pt requires Supervision for bed mobility (impulsive), Min A for functional transfers with RW (impulsive) ; Mod A for ambulation with RW (impulsive).  Pt presents functioning below baseline and w/ overall mobility deficits 2* to: decreased strength, decreased endurance, decreased mobility, impaired balance. These impairments place pt at risk for falls. Pt will continue to benefit from skilled PT interventions to address stated impairments; to maximize functional potential; for ongoing pt/family education; and DME needs. The patient's AM-PAC Basic Mobility Inpatient Short Form Raw Score Is 15. A Raw score of less than 16 suggests the patient may benefit from discharge to home. PT is currently recommending rehab on d/c from hospital due to poor safety awareness and unsafe mobility for home alone. Patient likely to refuse rehab and if so would benefit from [address]. Will continue to follow as able. Goals   Patient Goals to go home   STG Expiration Date 10/19/23   Short Term Goal #1 In 14 days, patient will    1) increase strength in BUE/BLE by 1/2 to 1 full grade for increased strength and stability needed for functional mobility 2) improve bed mobility to MI for improved mobility and decreased need for assist 3) increase functional transfers to MI for improved safety and functional mobility 4) ambulate 250ft with MI using LRAD for increased endurance and safety ambulating home and community environments 5) improve balance by 1 grade for improved safety and stability and decreased risk for falls. Plan   Treatment/Interventions ADL retraining;Functional transfer training;LE strengthening/ROM; Endurance training; Therapeutic exercise;Cognitive reorientation;Patient/family training;Equipment eval/education;Gait training;Bed mobility;Spoke to nursing;Spoke to case management;OT   PT Frequency 3-5x/wk   Recommendation   PT Discharge Recommendation (S)  Post acute rehabilitation services  (likely to refuse rehab, if refusing, then HHPT)   [address] walker   AM-PAC Basic Mobility Inpatient   Turning in Flat Bed Without Bedrails 3   Lying on Back to Sitting on Edge of Flat Bed Without Bedrails 3   Moving Bed to Chair 3   Standing Up From Chair Using Arms 3   Walk in Room 2   Climb 3-5 Stairs With Railing 1   Basic Mobility Inpatient Raw Score 15   Basic Mobility Standardized Score 36.97   Highest Level Of Mobility   [facility] Goal 4: Move to chair/commode   End of Consult   Patient Position at End of Consult Bedside chair;Bed/Chair alarm activated; All needs within reach         Elina Parents, PT, DPT

## 2023-10-05 NOTE — ASSESSMENT & PLAN NOTE
- Patient presented to the ED via  because he reports he is unable to take care of himself. Patient states that since he is wheelchair-bound, has worsening weakness and lives alone he has been urinating and defecating on himself  -Patient reports that he does not feel safe at home. Per patient  and him have made reports to Department of aging to try to get patient nursing assistant but has been unable to receive those benefits  -Patient reports that because he is unable to move much of his hands, he has been unable to bathe or feed himself  10/6: Now that the patient has increased strength, he will be more capable of self care. He does have someone at home that can take of him as well.      Plan  - Case management consulted

## 2023-10-05 NOTE — PROGRESS NOTES
[address] [place] [address]  Progress Note  Name: Leon Liao  MRN: [id number]  Unit/Bed#: -66 I Date of Admission: 10/2/2023   Date of Service: 10/5/2023 I Hospital Day: 2    Assessment/Plan   No new Assessment & Plan notes have been filed under this hospital service since the last note was generated. Service: Family Medicine            Subjective:   Patient seen and examined at bedside this AM.***    Objective:     Vitals: Blood pressure 128/78, pulse (!) 107, temperature 99.1 °F (37.3 °C), resp. rate 19, weight 62.5 kg (137 lb 12.6 oz), SpO2 95 %. ,Body mass index is 24.41 kg/m².       Intake/Output Summary (Last 24 hours) at 10/5/2023 0717  Last data filed at 10/5/2023 0701  Gross per 24 hour   Intake 3460 ml   Output 2780 ml   Net 680 ml       Physical Exam:   Physical Exam    Invasive Devices     Peripheral Intravenous Line  Duration           Peripheral IV 10/04/23 Left Forearm <1 day    Peripheral IV 10/04/23 Right Hand <1 day          Drain  Duration           Closed/Suction Drain Superior;Right Back Bulb 15 Fr. <1 day                           Lab and other studies:  {Results Review Statement:02040}     Recent Results (from the past 24 hour(s))   Fingerstick Glucose (POCT)    Collection Time: 10/04/23  6:51 PM   Result Value Ref Range    POC Glucose 101 65 - 140 mg/dl   ECG 12 lead    Collection Time: 10/05/23 12:30 AM   Result Value Ref Range    Ventricular Rate 116 BPM    Atrial Rate 116 BPM    HI Interval 134 ms    QRSD Interval 78 ms    QT Interval 302 ms    QTC Interval 419 ms    P Axis 68 degrees    QRS Axis 67 degrees    T Wave Axis 72 degrees   ECG 12 lead    Collection Time: 10/05/23 12:30 AM   Result Value Ref Range    Ventricular Rate 117 BPM    Atrial Rate 117 BPM    HI Interval 142 ms    QRSD Interval 70 ms    QT Interval 304 ms    QTC Interval 424 ms    P [place] 63 degrees    QRS Axis 66 degrees    T Wave Axis 73 degrees   CBC and Platelet    Collection Time: 10/05/23  1:15 AM   Result Value Ref Range    WBC 16.08 (H) 4.31 - 10.16 Thousand/uL    RBC 5.37 (H) 3.88 - 5.12 Million/uL    Hemoglobin 16.2 (H) 12.0 - 15.4 g/dL    Hematocrit 45.9 36.5 - 46.1 %    MCV 86 82 - 98 fL    MCH 30.2 26.8 - 34.3 pg    MCHC 35.3 31.4 - 37.4 g/dL    RDW 12.9 11.6 - 15.1 %    Platelets 211 022 - 026 Thousands/uL    MPV 9.9 8.9 - 12.7 fL   Basic metabolic panel    Collection Time: 10/05/23  1:15 AM   Result Value Ref Range    Sodium 135 135 - 147 mmol/L    Potassium 4.2 3.5 - 5.3 mmol/L    Chloride 101 96 - 108 mmol/L    CO2 22 21 - 32 mmol/L    ANION GAP 12 mmol/L    BUN 11 5 - 25 mg/dL    Creatinine 0.72 0.60 - 1.30 mg/dL    Glucose 147 (H) 65 - 140 mg/dL    Calcium 9.1 8.4 - 10.2 mg/dL    eGFR     Magnesium    Collection Time: 10/05/23  1:15 AM   Result Value Ref Range    Magnesium 1.6 (L) 1.9 - 2.7 mg/dL   Phosphorus    Collection Time: 10/05/23  1:15 AM   Result Value Ref Range    Phosphorus 3.5 2.7 - 4.5 mg/dL   CBC    Collection Time: 10/05/23  4:41 AM   Result Value Ref Range    WBC 17.48 (H) 4.31 - 10.16 Thousand/uL    RBC 4.97 3.88 - 5.12 Million/uL    Hemoglobin 14.8 12.0 - 15.4 g/dL    Hematocrit 42.4 36.5 - 46.1 %    MCV 85 82 - 98 fL    MCH 29.8 26.8 - 34.3 pg    MCHC 34.9 31.4 - 37.4 g/dL    RDW 13.0 11.6 - 15.1 %    Platelets 950 757 - 758 Thousands/uL    MPV 10.0 8.9 - 12.7 fL   Comprehensive metabolic panel    Collection Time: 10/05/23  4:41 AM   Result Value Ref Range    Sodium 137 135 - 147 mmol/L    Potassium 3.7 3.5 - 5.3 mmol/L    Chloride 107 96 - 108 mmol/L    CO2 21 21 - 32 mmol/L    ANION GAP 9 mmol/L    BUN 9 5 - 25 mg/dL    Creatinine 0.61 0.60 - 1.30 mg/dL    Glucose 116 65 - 140 mg/dL    Calcium 8.4 8.4 - 10.2 mg/dL    AST 21 5 - 45 U/L    ALT 17 7 - 52 U/L    Alkaline Phosphatase 75 34 - 104 U/L    Total Protein 6.0 (L) 6.4 - 8.4 g/dL    Albumin 3.8 3.5 - 5.0 g/dL    Total Bilirubin 0.88 0.20 - 1.00 mg/dL    eGFR         PPX:   Diet:  Code Status:   Dispo: Plan D/W *** and INTRACCritical access hospital Team    Lisandro Perez MD

## 2023-10-05 NOTE — ASSESSMENT & PLAN NOTE
10/2- Patient states that he is "paralyzed "and unable to move his right lower extremity. He states that this has been progressively worsening since right rotator cuff surgery in February 2023. He denies other trauma, incidents, inciting events.    -On chart review patient previously saw neurosurgery at [facility] and was recommended tx however declined at that time.   -Saw PCP on 9/27 and stated that he had worsening weakness and incontinence so wanted neurosurgery evaluation. It was noted at that visit that he was not interested in undergoing PT/OT, and wanted home health to assist him.    -Patient saw her neurosurgery on 10/02/2023. 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  -Patient reports that he is unable to live at home by himself as he is wheelchair bound and lives alone. 10/5: Patient is post op day 1 from laminectomy and fusion by neurosurgery. He reports increased strength in all 4 extremities, especially his right lower. He will benefit from PT to increase his strength. PT saw patient and instructed him on cervical brace wear.      Plan:  - Continue cervical brace wear as instructed  - Case management consult

## 2023-10-05 NOTE — ASSESSMENT & PLAN NOTE
- 08/07 MRI done:  Since the prior MRI 5/10/2022, the degree of central canal stenosis at C4-5  has slightly decreased and is now mild to moderate. 2. The abnormal T2 intramedullary T2 hyperintensity has progressed and now  xtends from the middle of C3 to the middle of C6. This could be secondary to   increased compressive myelopathy or cord edema superimposed on chronic compressive myelopathy.   -Patient seen by neurosurgery 10/02 recommended patient get surgery for stenosis and cord compression  - Patient pod: 1 from laminectomy and fusion. He reports decreased pain, better strength in all extremities, no numbness anymore, and better control of his bowels and bladder. However, patient says that his insurance ran out and would like to leave today.      Plan:  • Follow up neurosurgery

## 2023-10-05 NOTE — DISCHARGE INSTR - AVS FIRST PAGE
Discharge Instructions  Posterior cervical decompression and fixation/fusion      Surgical incisional care:  Keep incision clean and dry. Avoid applying creams, lotion or antiseptic to incision area. Check the wound daily. If the incision becomes red, swollen, tender, warm, or has increased drainage please notify physician immediately. Okay to apply a padded dressing over incision while wearing VISTA collar in order to reduce risk of irritation. Shower 3 days after surgery, but do not soak in a tub and no swimming. Use mild antimicrobial soap and water. Pat incision dry after showering. Cervical VISTA collar to be worn at all times except for showering. Change from VISTA (grey) collar to the [place] (peach) collar prior to showering. Incision may be cleaned with water and a mild antimicrobial soap using a clean washcloth. Incision is to be gently patted dry with a clean towel. Once dry, collar should be changed back to a VISTA (grey) collar with clean pads in place. Hand wash collar pads with mild soap and water. They are to be laid flat to dry on a clean towel. Recommend changing every 1-2 days. Please refer to VISTA collar instructions for further details. Activity Restrictions:  No heavy lifting greater than 5 - 10lbs. No strenuous activities. May walk as tolerated. Encourage at least 4 short walks per day. No driving while requiring cervical collar, anticipated six weeks. No significant neck movement. Postoperative medication:  Please take pain medications to relieve incision pain, and muscle relaxants to prevent spasms as directed. Please see after visit summary (AVS) for details. Please take over the counter stool softeners such as colace or senna-s to avoid constipation while on narcotics. Do not take ibuprofen, Naproxen/Aleve or any NSAID until cleared by surgeon. May take Tylenol instead.   If taking Coumadin, Aspirin, or Plavix, you may resume these medications when cleared by Neurosurgery. Follow-up as scheduled for a 2 week incision check. Follow-up 6 weeks after surgery with a repeat cervical spine upright x-rays to be completed prior to visit. **Please notify MD immediately if you experience a fever of 101. F, have increased neck or arm pain, new numbness and/or weakness in your arms, difficulty swallowing or breathing especially while lying down, numbness or weakness in arms or legs. **

## 2023-10-05 NOTE — PLAN OF CARE
Problem: OCCUPATIONAL THERAPY ADULT  Goal: Performs self-care activities at highest level of function for planned discharge setting. See evaluation for individualized goals. Description: Treatment Interventions: ADL retraining, UE strengthening/ROM, Endurance training, Compensatory technique education, Continued evaluation, Energy conservation, Activityengagement          See flowsheet documentation for full assessment, interventions and recommendations. Note: Limitation: Decreased ADL status, Decreased UE strength, Decreased Safe judgement during ADL, Decreased endurance, Decreased self-care trans, Decreased high-level ADLs  Prognosis: Fair  Assessment: Pt is 66 y.o. adult admitted to [facility] on 10/2/2023 w/ cervical myelopathy. Pt received "C3-6 posterior cervical fusion with autograft and allograft, C3-6 posterior instrumentation with lateral mass screws, C4-5 laminectomy" on 10/4/2023. Pt  has a past medical history of Anemia, Anxiety, Arthritis, Asthma, Dermatitis of foot, Edentulous, Fracture of fibula, GERD (gastroesophageal reflux disease), GERD (gastroesophageal reflux disease), History of fusion of cervical spine, Hyperlipidemia, Low back pain, Marijuana smoker, episodic (2/17/2016), Moderate persistent asthma, Neurosis, anxiety, panic type, Prepatellar bursitis, right knee, Smoker, Tinnitus, Tobacco abuse, and Wears glasses. . Pt with active OT orders and activity orders. Pt resides in a Apartment, with 0 TRACEY. Pt lives with alone. Pt was I w/ ADLs, received assistance with IADLs, (-) drove. Pt is currently functioning at S for bed mobility, min Ax1 for transfers with RW, and min to mod Ax1 for functional mobility. Pt is functioning at min A for ADLs. Pt demonstrate decreased insight to safety t/o session.  Pt is limited 2* pain, endurance, activity tolerance, functional mobility, balance, functional standing tolerance, decreased I w/ ADLS/IADLS, decreased safety awareness and decreased insight into deficits. The Areas of Occupational Performance Areas to address w/ pt include: eating, grooming, bathing/shower, toilet hygiene, dressing, health maintenance and functional mobility. Based off of an OT evaulation, assessment, and performance functioning, pt is identified as a high complexity. The patient's raw score on the -PAC Daily Activity Inpatient Short Form is 20. A raw score of greater than or equal to 19 suggests the patient may benefit from discharge to home. Please refer to the recommendation of the Occupational Therapist for safe discharge planning. OT recommendation for d/c includes Home with home OT vs Rehab pending progress. Pt would benefit from continued acute OT services for  2-3x/week to  w/in 10-14 days: to address functional goals.      OT Discharge Recommendation: (S) Home with home health rehabilitation (VS Rehab pending progress and support at home)

## 2023-10-05 NOTE — QUICK NOTE
Contacted by nursing that patient was tachycardiac. EKG done showed sinus tachycardia. Patient with no pmhx of cardiac issues. No current cardiac medication. Tachycardia likely 2/2 to pain vs patient being upset. While in room hr drop to 105 once patient felt  calm but climb as patient was telling story about why he was upset. Patient given pain med and atarax. Labs ordered. Will repeat lab and c/w fluid as patient was NPO all day for procedure. Blood noted in drain NSY AP aware.

## 2023-10-05 NOTE — OCCUPATIONAL THERAPY NOTE
Occupational Therapy Evaluation     Patient Name: Alley Mark  UKRQD'W Date: 10/5/2023  Problem List  Principal Problem:    Ambulatory dysfunction  Active Problems:    GERD without esophagitis    S/P cervical spinal fusion    Dyslipidemia    Asthma    Failure to thrive in adult    Cervical spondylosis with myelopathy    Past Medical History  Past Medical History:   Diagnosis Date    Anemia     Anxiety     Arthritis     Asthma     Dermatitis of foot     Last Assessed 1/18/2016    Edentulous     Fracture of fibula     Distal; Last Assessed 7/11/2014    GERD (gastroesophageal reflux disease)     GERD (gastroesophageal reflux disease)     History of fusion of cervical spine     Hyperlipidemia     Low back pain     states on prednisone for a  few days for back    Marijuana smoker, episodic 2/17/2016    Moderate persistent asthma     Last Assessed 7/3/2014    Neurosis, anxiety, panic type     Resolved 8/20/2015    Prepatellar bursitis, right knee     Smoker     Tinnitus     Tobacco abuse     Last Assessed 1/18/2016    Wears glasses     reading     Past Surgical History  Past Surgical History:   Procedure Laterality Date    CERVICAL FUSION      CERVICAL FUSION N/A 10/4/2023    Procedure: C3-6 POSTERIOR FUSION, C4-5 laminectomy;  Surgeon: Lashonda Moreland MD;  [facility];  Service: Neurosurgery    NECK SURGERY      Last Assessed 5/02/2017    WV EXCISION PREPATELLAR BURSA Right 3/8/2018    Procedure: EXCISION BURSA PREPATELLAR;  Surgeon: Orlando Duncan MD;  Location[facility];  Service: Orthopedics    WV SURGICAL ARTHROSCOPY SHOULDER W/ROTATOR CUFF RPR Right 7/24/2017    Procedure: ARTHROSCOPY SHOULDER, ROTATOR CUFF REPAIR,OPEN BICEP TENODESIS;  Surgeon: Orlando Duncan MD;  Location[facility];  Service: Orthopedics    TOOTH EXTRACTION               10/05/23 0848   OT Last Visit   OT Visit Date 10/05/23   Note Type   Note type Evaluation   Pain Assessment   Pain Assessment Tool 0-10   Pain Score 2   Pain Location/Orientation Location: Neck   Restrictions/Precautions   Weight Bearing Precautions Per Order No   Braces or Orthoses C/S Collar  (to be worn when ambulating)   Other Precautions Impulsive;Cognitive; Chair Alarm; Bed Alarm;Multiple lines; Fall Risk;Pain;Spinal precautions   Home Living   Type of [address] One level;Performs ADLs on one level;Elevator   Bathroom Shower/Tub Tub/shower unit   Bathroom Toilet Standard   Bathroom Equipment Grab bars in [facility] [employer]; Wheelchair-manual  (used w/c more recently due to weakness)   Additional Comments Pt reports living in an apartment with elevator access. Prior Function   Level of [place] Independent with ADLs; Independent with functional mobility; Independent with IADLS   Lives With (S)  Alone   Receives Help From Other (Comment)  ("Someone from the state" assists with paying bills and comes over 2x a week)   IADLs Independent with meal prep; Independent with medication management   Falls in the last 6 months 0   Lifestyle   Autonomy pt reports being I with ADLs, and receives A with IADLs. Reciprocal Relationships Pt lives alone - pt has a "mentor" 2x a week visit.    Intrinsic Gratification Pt enjoys keeping busy   General   Family/Caregiver Present No   Subjective   Subjective "I want to get out of here"   ADL   Where Assessed Edge of bed   Eating Assistance 5  Supervision/Setup   Grooming Assistance 5  Supervision/Setup   UB Bathing Assistance 5  Supervision/Setup   LB Bathing Assistance 4  Minimal Assistance   [address] [facility] 5  Supervision/Setup   LB Dressing Assistance 4  [address] [facility] 4  Minimal Assistance   Bed Mobility   Supine to Sit 5  Supervision   Additional items Increased time required;Verbal cues   Sit to Supine Unable to assess   Additional Comments OOB in chair at the end of the session with chair alarm on   Transfers   Sit to Stand 4  Minimal assistance   Additional items Assist x 1; Increased time required   Stand to Sit 4  Minimal assistance   Additional items Assist x 1; Increased time required   Additional Comments RW   Functional Mobility   Functional Mobility 4  Minimal assistance   Additional Comments Pt was able to demonstrate household mobility with min to mod Ax1 and RW. Pt required frequent vc for safety t/o session. Pt's right foot unable to clear floor completely. Additional items Rolling walker   Balance   Static Sitting Fair +   Dynamic Sitting Fair   Static Standing Fair -   Dynamic Standing Poor +   Ambulatory Poor   Activity Tolerance   Activity Tolerance Treatment limited secondary to agitation  (cog)   Medical Staff Made Aware PT Elder Single   Nurse Made Aware RN made aware   RUE Assessment   RUE Assessment WFL   LUE Assessment   LUE Assessment WFL   Hand Function   Gross Motor Coordination Functional   Fine Motor Coordination Impaired   Hand Function Comments decreased ability to touch each finger to thumb specifically RUE   Psychosocial   Psychosocial (WDL) WDL   Cognition   Overall Cognitive Status (S)  Impaired   Arousal/Participation Alert; Responsive;Arousable; Cooperative   Attention Attends with cues to redirect   Orientation Level Oriented X4   Memory Decreased recall of precautions;Decreased recall of recent events   Following Commands Follows one step commands with increased time or repetition   Comments Pt required verbal cueing t/o session. Pt demonstrate decreased safety awareness and insight into deficits. Assessment   Limitation Decreased ADL status; Decreased UE strength;Decreased Safe judgement during ADL;Decreased endurance;Decreased self-care trans;Decreased high-level ADLs   Prognosis Fair   Assessment Pt is 66 y.o. adult admitted to [facility] on 10/2/2023 w/ cervical myelopathy.  Pt received "C3-6 posterior cervical fusion with autograft and allograft, C3-6 posterior instrumentation with lateral mass screws, C4-5 laminectomy" on 10/4/2023. Pt  has a past medical history of Anemia, Anxiety, Arthritis, Asthma, Dermatitis of foot, Edentulous, Fracture of fibula, GERD (gastroesophageal reflux disease), GERD (gastroesophageal reflux disease), History of fusion of cervical spine, Hyperlipidemia, Low back pain, Marijuana smoker, episodic (2016), Moderate persistent asthma, Neurosis, anxiety, panic type, Prepatellar bursitis, right knee, Smoker, Tinnitus, Tobacco abuse, and Wears glasses. . Pt with active OT orders and activity orders. Pt resides in a Apartment, with 0 TRACEY. Pt lives with alone. Pt was I w/ ADLs, received assistance with IADLs, (-) drove. Pt is currently functioning at S for bed mobility, min Ax1 for transfers with RW, and min to mod Ax1 for functional mobility. Pt is functioning at min A for ADLs. Pt demonstrate decreased insight to safety t/o session. Pt is limited 2* pain, endurance, activity tolerance, functional mobility, balance, functional standing tolerance, decreased I w/ ADLS/IADLS, decreased safety awareness and decreased insight into deficits. The Areas of Occupational Performance Areas to address w/ pt include: eating, grooming, bathing/shower, toilet hygiene, dressing, health maintenance and functional mobility. Based off of an OT evaulation, assessment, and performance functioning, pt is identified as a high complexity. The patient's raw score on the AM-PAC Daily Activity Inpatient Short Form is 20. A raw score of greater than or equal to 19 suggests the patient may benefit from discharge to home. Please refer to the recommendation of the Occupational Therapist for safe discharge planning. OT recommendation for d/c includes Home with home OT vs Rehab pending progress. Pt would benefit from continued acute OT services for  2-3x/week to  w/in 10-14 days: to address functional goals.    Goals   Patient Goals to go home   LTG Time Frame 10-14 Long Term Goal see goals below   Plan   Treatment Interventions ADL retraining;UE strengthening/ROM; Endurance training; Compensatory technique education;Continued evaluation; Energy conservation; Activityengagement   Goal Expiration Date 10/19/23   OT Frequency 2-3x/wk   Recommendation   OT Discharge Recommendation (S)  Home with home health rehabilitation  (VS Rehab pending progress and support at home)   AM-PAC Daily Activity Inpatient   Lower Body Dressing 3   Bathing 3   Toileting 3   Upper Body Dressing 3   Grooming 4   Eating 4   Daily Activity Raw Score 20   Daily Activity Standardized Score (Calc for Raw Score >=11) 42.03   AM-PAC Applied Cognition Inpatient   Following a Speech/Presentation 2   Understanding Ordinary Conversation 4   Taking Medications 4   Remembering Where Things Are Placed or Put Away 3   Remembering List of 4-5 Errands 2   Taking Care of Complicated Tasks 2   Applied Cognition Raw Score 17   Applied Cognition Standardized Score 36.52   End of Consult   Education Provided Yes   Patient Position at End of Consult Bedside chair;Bed/Chair alarm activated; All needs within reach   Nurse Communication Nurse aware of consult     Pt w/ mod I will complete daily grooming tasks w/ adaptive equipment as needed. Pt will increase standing tolerance to 10 mins w/ mod I w/ adaptive equipment as needed. Pt will complete functional transfers w/ mod I on and off all surfaces w/ equipment as needed. Pt will complete functional mobility w/ mod I on and off all surfaces w/ equipment as needed. Pt will complete tolieting w/ mod I while demonstrating safety techniques w/ DME. Pt will complete feeding tasks w/ mod I using adaptive devices. Pt will demonstrate G safety awareness w/ mod I during OT session. Pt will demonstrate LE body dressing w/ mod I w/ adaptive equipment as needed. Pt will demonstrate UE body dressing w/ mod I w/ adaptive equipment as needed.      Pt will increase activity tolerance to G during a 30 min OT tx session. Pt will demonstrate bed mobility skills w/ mod I. Pt will complete IADLs tasks w/ mod I. Pt will participate in a formal/functional cognitive assessment as needed to assist with safe discharge planning.        Torrey Dalton, OTR/L

## 2023-10-05 NOTE — DISCHARGE SUMMARY
[address] [facility], [place]  Discharge- Carmen Rothman 1965, 66 y.o. adult MRN: [id number]  Unit/Bed#: [facility] Encounter: [id number]  Primary Care Provider: Lorin Balderas DO   Date and time admitted to hospital: 10/2/2023  9:42 PM    Failure to thrive in adult  Assessment & Plan  - Patient presented to the ED via  because he reports he is unable to take care of himself. Patient states that since he is wheelchair-bound, has worsening weakness and lives alone he has been urinating and defecating on himself  -Patient reports that he does not feel safe at home. Per patient  and him have made reports to Department of aging to try to get patient nursing assistant but has been unable to receive those benefits  -Patient reports that because he is unable to move much of his hands, he has been unable to bathe or feed himself  10/6: Now that the patient has increased strength, he will be more capable of self care. He does have someone at home that can take of him as well. Plan  - Case management consulted    Asthma  Assessment & Plan  -Patient with a history of asthma. Possible COPD component  -On as needed albuterol at home  -Patient noted to be wheezing on exam    Plan  -DuoNebs every 6 as needed      S/P cervical spinal fusion  Assessment & Plan  - 08/07 MRI done:  Since the prior MRI 5/10/2022, the degree of central canal stenosis at C4-5  has slightly decreased and is now mild to moderate. 2. The abnormal T2 intramedullary T2 hyperintensity has progressed and now  xtends from the middle of C3 to the middle of C6. This could be secondary to   increased compressive myelopathy or cord edema superimposed on chronic compressive myelopathy.   -Patient seen by neurosurgery 10/02 recommended patient get surgery for stenosis and cord compression  - Patient pod: 1 from laminectomy and fusion.  He reports decreased pain, better strength in all extremities, no numbness anymore, and better control of his bowels and bladder. However, patient says that his insurance ran out and would like to leave today. Plan:  • Follow up neurosurgery     * Ambulatory dysfunction  Assessment & Plan  10/2- Patient states that he is "paralyzed "and unable to move his right lower extremity. He states that this has been progressively worsening since right rotator cuff surgery in February 2023. He denies other trauma, incidents, inciting events.    -On chart review patient previously saw neurosurgery at [facility] and was recommended tx however declined at that time.   -Saw PCP on 9/27 and stated that he had worsening weakness and incontinence so wanted neurosurgery evaluation. It was noted at that visit that he was not interested in undergoing PT/OT, and wanted home health to assist him.    -Patient saw her neurosurgery on 10/02/2023. 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  -Patient reports that he is unable to live at home by himself as he is wheelchair bound and lives alone. 10/5: Patient is post op day 1 from laminectomy and fusion by neurosurgery. He reports increased strength in all 4 extremities, especially his right lower. He will benefit from PT to increase his strength. PT saw patient and instructed him on cervical brace wear.      Plan:  - Continue cervical brace wear as instructed  - Case management consult        Discharge Summary - Ismael Salcido 66 y.o. adult MRN: [id number]    Unit/Bed#: -01 Encounter: [id number]    Admission Date: 10/2/2023   Discharge Date: 10/5/2023    Admitting Diagnosis:   Patient Active Problem List   Diagnosis   • Prepatellar bursitis of right knee   • Tobacco abuse   • GERD without esophagitis   • Incomplete tear of right rotator cuff   • Low back pain   • Neck pain   • Nicotine dependence   • S/P cervical spinal fusion   • Vitamin D deficiency   • Hypertriglyceridemia   • HNP (herniated nucleus pulposus) with myelopathy, cervical   • Dyslipidemia   • Cervical radiculopathy   • Bipolar disorder, mixed (HCC)   • Asthma   • Screening for colon cancer   • Abnormal thyroid function test   • Chronic right-sided low back pain without sciatica   • Palpitation   • Marijuana smoker, episodic   • Chronic pain   • Iron deficiency anemia   • Non-healing skin lesion   • Acanthosis nigricans   • Scabies   • Ambulatory dysfunction   • Annual physical exam   • Skin breakdown   • Urinary incontinence   • Fecal incontinence   • Failure to thrive in adult   • Cervical spondylosis with myelopathy        HPI: Patient is post op day 1 from C3-6 posterior laminectomy and fusion. He reports that almost all of his symptoms have resolved. His numbness in his arms and abdomen is "99% gone." His strength in his right lower extremity has also returned. In addition, he has better control of his bowel and bladder. His pain is also gone. He is able to take deeper breaths. Patient denies cp/sob, f/c, n/v, abd pain, or swelling in his legs. However, the patient says that he is leaving [facility] today because his insurance is no longer covering his hospital stay. Hospital Course:     From H&P dated: 10/2/2023 "66 y.o. adult with a past medical history of cervical radiculopathy, herniated nuclear polyposis, bipolar disorder, urinary and fecal incontinence and ambulatory dysfunction who presented today for the inability to care for himself. Patient reports since right rotator cuff surgery and on 2/2023 patient has had progressively worsening weakness and urinary/fecal incontinence. Patient reports that he uses wheelchair to go get around and has occasionally been urinating and defecating on himself.   Patient also reporting that he slid off the couch today and was unable to get himself back on so he called  bring him to the hospital. Per patient he has Department of aging case but has had little assistance from the state. Of note patient recently saw by neurosurgery who recommended patient have surgery on 10/06. Patient states that he would be unable to care for himself until the last symptoms are getting progressively worse. "     Neurosurgery saw the patient in the ED the next day. They decided to proceed with a laminectomy and fusion the next day on 10/4. Patient did well with operation. However, later that night there was an episode of tachycardia. Ecg showed sinus tach. Most likely secondary to pain and arguing with staff. His heart rate slowly trended down later in the night and improved with pain medication. On the morning of 10/5, the patient reported doing well, with "almost 100%" resolution of his symptoms. His pain, strength, numbness, bowel and bladder control, and breathing have all improved. However, he said that he was going to leave [facility] because his insurance is no longer covering his stay. Patient was asked to elaborate and he said he called Medicaid and they told him they would no longer be paying because he "ran out". Neurosurgery was immediately asked to see the patient because he still had a PACO drain in his neck that produced 40 mL of bloody discharged overnight. Neurosurgery took out PACO drain and told patient to follow up with them outpatient. Before patient left, he signed [facility] paperwork. He had full capacity and understood the risks of leaving, including the risk of infection, hematoma, airway compromise, neurologic compromise, etc. Patient said he understood the risks and wished to proceed with leaving. Patient said that he will make outpatient appointment with neurosurgery for follow up.      All medication changes were reviewed and necessary follow-ups were discussed prior to the patient leaving the hospital.     Exam on Day of Discharge:   Wesley Bras:    10/05/23 0008 10/05/23 0323 10/05/23 0436 10/05/23 0736   BP: 128/83 128/78  140/83   Pulse: (!) 116 (!) 107  92   Resp: 19   18   Temp: 98.2 °F (36.8 °C) 99.1 °F (37.3 °C)  98.8 °F (37.1 °C)   TempSrc:       SpO2: 95% 95%  92%   Weight:   62.5 kg (137 lb 12.6 oz)        Physical Exam  General:  alert, appropriately interactive, no acute distress  Head:  atraumatic and normocephalic  Nose:  midline, clear, no rhinorrhea  Throat/Tongue: Tongue protrudes midline, moist mucous membranes, oropharynx clear  Neck:  PACO drain with blood discharge. Full ROM intact without pain/stiffness, no lymphadenopathy  Lungs: Wheezes in all lung fields. Non-labored breathing  Heart:  Regular rate and rhythm, no murmurs/rubs appreciated  Abdomen:  Abdomen soft, non-tender. no masses  Neuro:  AAOx3, strength 5/5 upper extremities, strength 5/5 left lower extremity, 4+ right lower extremity. Mild numbness noted right lower extremity. Musculoskeletal: No pitting edema in LEs b/l, moves all extremities freely, no gross deformities noted  Skin:  intact throughout, warm, no rashes or bruising noted     Significant Findings/Test Results:  None    Procedures Performed:   10/4/23 - ACDF    Consultation(s) During Hospital Stay:  Neurosurgery    Incidental Findings:   None    Discharge Medications:  Significant medication changes during admission - See after visit summary for complete discharge medication reconciliation. Start: n/a    Change: n/a    Do not restart: n/a  Please follow-up with your PCP or original prescriber for instructions on if/when to restart any home medications that were not prescribed during your hospitalization. Continue: All other home medications as originally prescribed    Required Outpatient Follow-up:   · PCP: Brooklynn Denise DO. Instructed patient to schedule PCP visit within 1 week of discharge from hospital/facility.   · Neurosurgery  · Please see "Follow-up Providers" section of AVS for detailed instructions and other providers not listed above     Future Appointments Date Time Provider [address]   10/7/2023  8:00 AM Jessie Tompkins, OT [facility]   10/12/2023 10:30 AM [facility]   10/16/2023  3:00 PM Krystle Mcintyre  [address] [facility] [address]        Test Results Pending at Discharge:  None    Complications: None  Condition at Discharge: Stable  Disposition: Left against medical advice     On the day of discharge the patient is stable and without new concerns. He/she denies clinical deterioration of their presenting problem/chief complaint unless otherwise stated. He/she is able to tolerate an appropriate diet. He/she is able to ambulate at baseline or to the maximum level during this hospitalization. He/she is aware of the current status of their medical problems and understands the plan of treatment going forward, including any/all outpatient follow-up. The patient understands and agrees with the plan as communicated today. All pertinent lab results, imaging studies, procedures, incidental findings, and medication changes have been communicated to the patient and/or their family. All pertinent questions have been answered to the best of my ability. Discharge instructions/Information to patient and family:   See after visit summary for detailed information provided to patient and family    Provisions for Follow-Up Care: See after visit summary for information related to follow-up care and any pertinent home health orders. Planned Readmission: None    Discharge Statement: I spent 30 minutes discharging the patient. This time was spent on the day of discharge. I had direct contact with the patient on the day of discharge. Additional documentation is required if more than 30 minutes were spent on discharge. The above plan was discussed with the attending physician who saw the patient on the day of discharge.     [facility]  Sonia Cook MD  Family Medicine, [facility]   10/5/2023

## 2023-10-05 NOTE — ASSESSMENT & PLAN NOTE
POD 1 C3-6 posterior cervical fusion with autograft and allograft. C3-6 posterior instrumentation with lateral mass screws. C4-5 laminectomy  · Was originally seen in clinic for 6-month history of progressive worsening neck pain, bilateral upper extremity and lower extremity weakness/numbness with bowel and bladder incontinence and gait instability. · History of prior C5-6 ACDF in 2017  · He presented to the ED secondary to a fall at home and unable to get up secondary to his myopathy and weakness. No new imaging to review    Plan:  Continue frequent neurological checks. PACO drain to FS with 125ml since OR, patient leaving AMA, removed PACO drain, patient tolerated well Steri-Strips placed. Postop x-rays pending but patient leaving AMA  [place] collar to be worn when out of bed  Medical management and pain control per primary team  Mobilize with PT/OT  DVT PPX: SCDs and cleared from neurosurgical standpoint for pharmacological DVT prophylaxis  Spoke with , aware patient is leaving AMA. Reviewed with patient red flag signs and when to contact neurosurgery or present to the ED. Neurosurgery will sign off, patient will follow-up in 2 weeks for incision check and 6 weeks for postoperative visit with cervical spine x-ray, call with any further questions or concerns.

## 2023-10-05 NOTE — RESTORATIVE TECHNICIAN NOTE
Restorative Technician Note      Patient Name: Balwinder Cedeño     Restorative Tech Visit Date: 10/05/23  Note Type: Bracing, Initial consult  Patient Position Upon Consult: Supine  Brace Applied: [place] Collar Set (chin plate at level 2)  Additional Brace Ordered: No  Patient Position When Brace Applied: Supine  Education Provided: Yes  Patient Position at End of Consult: Supine  Nurse Communication: Nurse aware of consult, application of brace    Original orders states the pt was fit in the OR for the cervical brace. Upon entering the pt's room, he does not have a cervical brace on. Reached out to Yasmin Christensen PA-C who states the patient should be fit for the cervical brace and only needs to wear it when walking - if he is in the bed or the chair he does not need to wear it. This information was explained to both the patient and the nurse. Educated the patient on how to don/doff the cervical brace. Please contact Mobility Coordinator on Tiger text "SLB-PT-Restorative Tech" role in regards to bracing instruction and/or adjustment.     [employer] ,

## 2023-10-05 NOTE — PROGRESS NOTES
[facility]  Progress Note  Name: Ochoa Nguyen  MRN: [id number]  Unit/Bed#: -59 I Date of Admission: 10/2/2023   Date of Service: 10/5/2023 I Hospital Day: 2    Assessment/Plan   Cervical spondylosis with myelopathy  Assessment & Plan  POD 1 C3-6 posterior cervical fusion with autograft and allograft. C3-6 posterior instrumentation with lateral mass screws. C4-5 laminectomy  · Was originally seen in clinic for 6-month history of progressive worsening neck pain, bilateral upper extremity and lower extremity weakness/numbness with bowel and bladder incontinence and gait instability. · History of prior C5-6 ACDF in 2017  · He presented to the ED secondary to a fall at home and unable to get up secondary to his myopathy and weakness. No new imaging to review    Plan:  Continue frequent neurological checks. PACO drain to FS with 125ml since OR, patient leaving AMA, removed PACO drain, patient tolerated well Steri-Strips placed. Postop x-rays pending but patient leaving AMA  [place] collar to be worn when out of bed  Medical management and pain control per primary team  Mobilize with PT/OT  DVT PPX: SCDs and cleared from neurosurgical standpoint for pharmacological DVT prophylaxis  Spoke with , aware patient is leaving AMA. Reviewed with patient red flag signs and when to contact neurosurgery or present to the ED. Neurosurgery will sign off, patient will follow-up in 2 weeks for incision check and 6 weeks for postoperative visit with cervical spine x-ray, call with any further questions or concerns. Subjective/Objective     Chief Complaint: "I am leaving!"    Subjective: Patient states he is leaving today secondary to insurance and not wanting to pay for hospital stay. He is very adamant about leaving even after discussion of why we would like him to stay in the hospital and he is not cleared for discharge.   Patient states he does not have any neck pain.  He does continue to have some residual numbness and tingling in his fingertips but he believes his strength has improved. With improved control of bowel and bladder. He denies any headaches, dizziness, blurry vision, chest pain, shortness of breath, abdominal pain, nausea, vomiting, diarrhea, no new problems with bowel or bladder, no new weakness or numbness/tingling. Objective: Patient comfortably sitting out in chair with Vista collar in place, NAD. I/O       10/03 0701  10/04 0700 10/04 0701  10/05 0700 10/05 0701  10/06 0700    P. O. 465 0 180    I.V. (mL/kg)  3460 (55.4)     Total Intake(mL/kg) 465 3460 (55.4) 180 (2.9)    Urine (mL/kg/hr) 1150 2650 (1.8) 300 (0.8)    Drains  125 45    Stool 0      Total Output 1150 2775 345    Net -685 +685 -165           Unmeasured Stool Occurrence 1 x            Invasive Devices     Drain  Duration           Closed/Suction Drain Superior;Right Back Bulb 15 Fr. <1 day                Physical Exam:  Vitals: Blood pressure 140/83, pulse 92, temperature 98.8 °F (37.1 °C), resp. rate 18, weight 62.5 kg (137 lb 12.6 oz), SpO2 92 %. ,Body mass index is 24.41 kg/m². General appearance: alert, appears stated age, cooperative and no distress  Head: Normocephalic, without obvious abnormality, atraumatic  Eyes: EOMI, PERRL, conjugate gaze  Neck: PACO drain removed and Steri-Strips placed dressing removed and new dressing applied this remains clean, dry, intact Vista collar placed back on patient  Lungs: non labored breathing  Heart: regular heart rate  Neurologic:   Mental status: Alert, oriented x3, thought content appropriate, speech is clear, following commands  Cranial nerves: grossly intact (Cranial nerves II-XII)  Sensory: normal to light touch in all extremities x4 except some decrease sensation in bilateral fingers. Motor: moving all extremities, R  3/5, L  4-/5, R bicep/tricep 3-4-/5, L bicep/tricep 4-/5.  R HF 3/5, L HF 4-/5, L KE 4/5, R KE 3-4-/5 R DF/PF 2-3/5, L DF/PF 4/5        Lab Results:  Results from last 7 days   Lab Units 10/05/23  0441 10/05/23  0115 10/02/23  2240   WBC Thousand/uL 17.48* 16.08* 8.24   HEMOGLOBIN g/dL 14.8 16.2* 15.1   HEMATOCRIT % 42.4 45.9 41.3   PLATELETS Thousands/uL 276 263 262   NEUTROS PCT %  --   --  65   MONOS PCT %  --   --  9   EOS PCT %  --   --  1     Results from last 7 days   Lab Units 10/05/23  0441 10/05/23  0115 10/02/23  2240   SODIUM mmol/L 137 135 141   POTASSIUM mmol/L 3.7 4.2 3.7   CHLORIDE mmol/L 107 101 107   CO2 mmol/L 21 22 22   BUN mg/dL 9 11 14   CREATININE mg/dL 0.61 0.72 0.67   CALCIUM mg/dL 8.4 9.1 8.6   ALK PHOS U/L 75  --  69   ALT U/L 17  --  25   AST U/L 21  --  20     Results from last 7 days   Lab Units 10/05/23  0115   MAGNESIUM mg/dL 1.6*     Results from last 7 days   Lab Units 10/05/23  0115   PHOSPHORUS mg/dL 3.5     Results from last 7 days   Lab Units 10/03/23  1309   INR  0.96     No results found for: "TROPONINT"  ABG:  Lab Results   Component Value Date    PHART 7.429 03/27/2015    QFL2BLR 38.8 03/27/2015    PO2ART 185.9 (H) 03/27/2015    ZPP9HPT 25 03/27/2015    BEART 0.9 03/27/2015       Imaging Studies: I have personally reviewed pertinent reports. and I have personally reviewed pertinent films in PACS    XR spine cervical 2 or 3 vw injury    Result Date: 10/4/2023  Impression: Fluoroscopic guidance provided for surgical procedure. Please refer to the separate procedure notes for additional details. Localization procedure was performed, with the OR notified of the level at approximately 4:57 p.m. on on 10/4/2023 via telephone conversation with the OR x-ray technologist . Additional images were obtained after level verification and are present for evaluation. Workstation performed: [id number]     XR spine cervical complete 6+ vw flex/ext/obl    Result Date: 10/4/2023  Impression: Stable examination status post anterior fusion at C5-C6.  Workstation performed: [id number]     XR chest 1 view portable    Result Date: 10/3/2023  Impression: No acute cardiopulmonary disease. Workstation performed: [id number]       EKG, Pathology, and Other Studies: I have personally reviewed pertinent reports.       VTE Mechanical Prophylaxis: sequential compression device

## 2023-10-06 ENCOUNTER — TELEPHONE (OUTPATIENT)
Dept: FAMILY MEDICINE CLINIC | Facility: CLINIC | Age: 58
End: 2023-10-06

## 2023-10-06 ENCOUNTER — TELEPHONE (OUTPATIENT)
Dept: NEUROSURGERY | Facility: CLINIC | Age: 58
End: 2023-10-06

## 2023-10-06 DIAGNOSIS — Z98.1 S/P CERVICAL SPINAL FUSION: Primary | ICD-10-CM

## 2023-10-06 RX ORDER — OXYCODONE HYDROCHLORIDE 5 MG/1
5 TABLET ORAL EVERY 4 HOURS PRN
Qty: 30 TABLET | Refills: 0 | Status: SHIPPED | OUTPATIENT
Start: 2023-10-06 | End: 2023-10-16 | Stop reason: SDUPTHER

## 2023-10-06 NOTE — TELEPHONE ENCOUNTER
10/06/2023-PT DISCHARGED AND Left against medical advice or discontinued care  10/19/2023-2 WK POV W/ALBERT  11/27/2023-6 WK POV W/ALBERT W/CERVICAL SPINE XRAY        10/05/2023-Lilli DAVID Check-ANGELICA Cabrera   Patient needs 2-week incision check with  and 6-week POV with cervical spine x-rays with , sx was 10/4

## 2023-10-06 NOTE — TELEPHONE ENCOUNTER
1st attempt- called patient on preferred number to check in on progress since he's been discharged from the hospital and to provide post operative instructions. Phone was answered, but no one would speak. Could not get through again. Will make another attempt.

## 2023-10-06 NOTE — UTILIZATION REVIEW
NOTIFICATION OF ADMISSION DISCHARGE   This is a Notification of Discharge from [facility]. Please be advised that this patient has been discharge from our facility. Below you will find the admission and discharge date and time including the patient’s disposition. UTILIZATION REVIEW CONTACT:  Whitney Zimmer  Utilization   Network Utilization Review Department  Phone: [phone] x carefully listen to the prompts. All voicemails are confidential.  Email: [email or web address]     ADMISSION INFORMATION  PRESENTATION DATE: 10/2/2023  9:42 PM  OBERVATION ADMISSION DATE  INPATIENT ADMISSION DATE: 10/3/23 12:18 AM   DISCHARGE DATE: 10/5/2023 10:55 AM   DISPOSITION:Left against medical advice or discontinued care    Network Utilization Review Department  ATTENTION: Please call with any questions or concerns to [phone] and carefully listen to the prompts so that you are directed to the right person. All voicemails are confidential.   For Discharge needs, contact Care Management DC Support Team at [phone] opt. 2  Send all requests for admission clinical reviews, approved or denied determinations and any other requests to dedicated fax number below belonging to the campus where the patient is receiving treatment.  List of dedicated fax numbers for the Facilities:  [place] DENIALS (Administrative/Medical Necessity) [phone]   DISCHARGE SUPPORT TEAM (Network) [phone] [facility] (Maternity/NICU/Pediatrics) [phone]   [address] [place] [address] Drive [phone]   [facility] [phone] [address] [place] [phone]   [address] [address] [place], [place] [address]y [facility] [place] [phone]

## 2023-10-06 NOTE — TELEPHONE ENCOUNTER
Incontinence supplies have to be signed off by an MD to be processed  as per the patient's insurance

## 2023-10-06 NOTE — TELEPHONE ENCOUNTER
Patient returned post op call. Patient reports he is doing well overall and denies any incisional issues or fevers. Patient is able to ambulate around the house and complete ADLs. Educated the patient about the importance of preventing blood clots and provided measures how to prevent them. Patient has moved his bowels since the surgery. Encouraged patient to take an over the counter stool softener, if he is taking narcotic pain medication. Encouraged fiber intake and fluids. Reviewed incision care with the patient. Advised that after three days he may take a shower and gently wash the surgical site with soap and water. Use clean wash cloth, towels, and clothing. Do not submerge in water until cleared by the surgeon. Do not apply any creams, ointments, or lotions to the site. Patient is aware to call the office if any redness, swelling, drainage, dehiscence of incision, or fever >100 F occurs. Patient reports he is in severe pain and is taking 30 ibuprofen a day. After review of the chart, he was discharged with no pain medication. Advised patient that he absolutely must not take any ibuprofen at all as it can inhibit the healing process. Advised him that he may only take tylenol and not to exceed 3000mg in a 24 hr period. Additionally spoke to provider about additional pain medication. Prescribed 30 tab of oxycodone 5mg Q4. Patient was appreciative of the assistance. Patient is aware to call the office if any concerns or questions may arise. Reminded patient of his upcoming appointments with the date/time/location. Patient was appreciative for the call.

## 2023-10-09 ENCOUNTER — TRANSITIONAL CARE MANAGEMENT (OUTPATIENT)
Dept: FAMILY MEDICINE CLINIC | Facility: CLINIC | Age: 58
End: 2023-10-09

## 2023-10-09 ENCOUNTER — PATIENT OUTREACH (OUTPATIENT)
Dept: FAMILY MEDICINE CLINIC | Facility: CLINIC | Age: 58
End: 2023-10-09

## 2023-10-09 NOTE — UTILIZATION REVIEW
NOTIFICATION OF ADMISSION DISCHARGE   This is a Notification of Discharge from [address] [place] [address]. Please be advised that this patient has been discharge from our facility. Below you will find the admission and discharge date and time including the patient’s disposition. UTILIZATION REVIEW CONTACT:  Sherif Encarnacion  Utilization   Network Utilization Review Department  Phone: [phone] x carefully listen to the prompts. All voicemails are confidential.  Email: [email or web address]     ADMISSION INFORMATION  PRESENTATION DATE: 10/2/2023  9:42 PM  OBERVATION ADMISSION DATE:   INPATIENT ADMISSION DATE: 10/3/23 12:18 AM   DISCHARGE DATE: 10/5/2023 10:55 AM   DISPOSITION:Left against medical advice or discontinued care    Network Utilization Review Department  ATTENTION: Please call with any questions or concerns to [phone] and carefully listen to the prompts so that you are directed to the right person. All voicemails are confidential.   For Discharge needs, contact Care Management DC Support Team at [phone] opt. 2  Send all requests for admission clinical reviews, approved or denied determinations and any other requests to dedicated fax number below belonging to the campus where the patient is receiving treatment.  List of dedicated fax numbers for the Facilities:  [place] DENIALS (Administrative/Medical Necessity) [phone]   DISCHARGE SUPPORT TEAM (Network) [phone] [facility] [facility] (Maternity/NICU/Pediatrics) [phone]   [address] [place] [address] Drive [phone]   [facility] [phone] 1505 [facility] [address] [facility] [address] [place] [address] [place] [phone]   [address] [address] [facility] [phone]

## 2023-10-09 NOTE — PROGRESS NOTES
OP SWCM referral received from MONTEZ Hayden following pt's recent hospitalization to assist pt with transportation and ADL needs. Chart review completed. Per chart review, pt was admitted to [facility] from 10/2-10/5 for ambulatory dysfunction. Per admission note, pt reported to hospital with failure to thrive in adult, reporting he is unable to take care of himself due to being wheelchair bound and worsening weakness. Per chart review, pt does have a [place]. Per chart, pt presented to hospital with  that pt and  have tried to get help through [facility] for nursing assistant, but pt has been unable to receive those benefits. Per chart, pt lives along and is unable to live at home by himself due to being wheelchair bound. Per chart, pt was seen by neurosurgery at [facility] and recommended tx and surgery. Per chart review, pt declined OT/PT at appt and only wanted HHA. Per chart, pt received laminectomy and fusion during hospital stay on 10/5. Per chart, pt left AMA due to insurance no longer covering his hospital stay. Per chart, recommendations for pt upon d/c are PT. Per chart, pt has multiple cancelled HHA appts and unsuccessful outreach attempts. Per chart, most recent d/c from hospital reports that pt to be seen for [facility] 10/7, however per chart, appt was cancelled. OP SWCM called pt and had to leave a VM introducing self, role and reason for calling. OP SWCM asked pt to return call to discuss the referral received to assist pt with transportation and [facility] for ADLs. OP SWCM will await return call and will f/u as needed. OP SWCM called and spoke w/ Danna Robert, pt's [address] [place]. Danna Robert stated that pt was suffering with paralysis until this most recent surgery he had. Danna Robert states that pt is needing PT as now that he is able to move his limbs, he needs to gain more strength and mobility back. Danna Robert states pt is able to walk around his house, but that is about it right now.  Pt was released from the hospital on 10/5 and Diana Geronimoheleneshira has not spoken to him since his d/c. He did leave a VM for pt to call him to see where pt is at. He isn't sure if pt will be agreeable to A now that he has his mobility back. OP SWCM discussed that A does PT in the home if he is needing that.      OP SWCM sent f/u message to [facility] to see status of pt's referral for PT.

## 2023-10-11 ENCOUNTER — HOME HEALTH ADMISSION (OUTPATIENT)
Dept: HOME HEALTH SERVICES | Facility: HOME HEALTHCARE | Age: 58
End: 2023-10-11

## 2023-10-11 ENCOUNTER — TELEPHONE (OUTPATIENT)
Dept: FAMILY MEDICINE CLINIC | Facility: CLINIC | Age: 58
End: 2023-10-11

## 2023-10-11 DIAGNOSIS — R26.2 AMBULATORY DYSFUNCTION: ICD-10-CM

## 2023-10-11 DIAGNOSIS — M47.12 CERVICAL SPONDYLOSIS WITH MYELOPATHY: ICD-10-CM

## 2023-10-11 DIAGNOSIS — M50.00 HNP (HERNIATED NUCLEUS PULPOSUS) WITH MYELOPATHY, CERVICAL: Primary | ICD-10-CM

## 2023-10-11 NOTE — TELEPHONE ENCOUNTER
[facility] needing a prescription for condom catheters. Their fax number is [phone].  Please advise thank you

## 2023-10-12 ENCOUNTER — HOME CARE VISIT (OUTPATIENT)
Dept: HOME HEALTH SERVICES | Facility: HOME HEALTHCARE | Age: 58
End: 2023-10-12

## 2023-10-12 ENCOUNTER — TELEPHONE (OUTPATIENT)
Dept: FAMILY MEDICINE CLINIC | Facility: CLINIC | Age: 58
End: 2023-10-12

## 2023-10-12 NOTE — CASE COMMUNICATION
Pt not admitted to [facility]NA services. Pt feels he is doing well since hospitalization and does not need PT and OT at this time. Pts only interest is obtaining a power scooter for community mobility, please advise as needed. Of note, pt not wearing cervical collar during visit.

## 2023-10-12 NOTE — TELEPHONE ENCOUNTER
Please review complete and sign    Dr Inocencio Faust  Physician Prescription and Statement of Medical Necessity  [phone]      [facility] folder    Fax [phone]

## 2023-10-16 ENCOUNTER — OFFICE VISIT (OUTPATIENT)
Dept: FAMILY MEDICINE CLINIC | Facility: CLINIC | Age: 58
End: 2023-10-16

## 2023-10-16 DIAGNOSIS — Z98.1 S/P CERVICAL SPINAL FUSION: ICD-10-CM

## 2023-10-16 DIAGNOSIS — R26.2 AMBULATORY DYSFUNCTION: ICD-10-CM

## 2023-10-16 DIAGNOSIS — Z00.00 ANNUAL PHYSICAL EXAM: Primary | ICD-10-CM

## 2023-10-16 DIAGNOSIS — R32 URINARY INCONTINENCE, UNSPECIFIED TYPE: Primary | ICD-10-CM

## 2023-10-16 PROCEDURE — 99396 PREV VISIT EST AGE 40-64: CPT | Performed by: FAMILY MEDICINE

## 2023-10-16 RX ORDER — OXYCODONE HYDROCHLORIDE 5 MG/1
5 TABLET ORAL EVERY 4 HOURS PRN
Qty: 18 TABLET | Refills: 0 | Status: SHIPPED | OUTPATIENT
Start: 2023-10-16 | End: 2023-10-25 | Stop reason: SDUPTHER

## 2023-10-16 NOTE — PATIENT INSTRUCTIONS
Wellness Visit for Adults   AMBULATORY CARE:   A wellness visit  is when you see your healthcare provider to get screened for health problems. Your healthcare provider will also give you advice on how to stay healthy. Write down your questions so you remember to ask them. Ask your healthcare provider how often you should have a wellness visit. What happens at a wellness visit:  Your healthcare provider will ask about your health, and your family history of health problems. This includes high blood pressure, heart disease, and cancer. He or she will ask if you have symptoms that concern you, if you smoke, and about your mood. You may also be asked about your intake of medicines, supplements, food, and alcohol. Any of the following may be done: Your weight  will be checked. Your height may also be checked so your body mass index (BMI) can be calculated. Your BMI shows if you are at a healthy weight. Your blood pressure  and heart rate will be checked. Your temperature may also be checked. Blood and urine tests  may be done. Blood tests may be done to check your cholesterol levels. Abnormal cholesterol levels increase your risk for heart disease and stroke. You may also need a blood or urine test to check for diabetes if you are at increased risk. Urine tests may be done to look for signs of an infection or kidney disease. A physical exam  includes checking your heartbeat and lungs with a stethoscope. Your healthcare provider may also check your skin to look for sun damage. Screening tests  may be recommended. A screening test is done to check for diseases that may not cause symptoms. The screening tests you may need depend on your age, gender, family history, and lifestyle habits. For example, colorectal screening may be recommended if you are 48years old or older. Screening tests you need if you are a woman:   A Pap smear  is used to screen for cervical cancer.  Pap smears are usually done every 3 to 5 years depending on your age. You may need them more often if you have had abnormal Pap smear test results in the past. Ask your healthcare provider how often you should have a Pap smear. A mammogram  is an x-ray of your breasts to screen for breast cancer. Experts recommend mammograms every 2 years starting at age 48 years. You may need a mammogram at age 52 years or younger if you have an increased risk for breast cancer. Talk to your healthcare provider about when you should start having mammograms and how often you need them. Vaccines you may need:   Get an influenza vaccine  every year. The influenza vaccine protects you from the flu. Several types of viruses cause the flu. The viruses change over time, so new vaccines are made each year. Get a tetanus-diphtheria (Td) booster vaccine  every 10 years. This vaccine protects you against tetanus and diphtheria. Tetanus is a severe infection that may cause painful muscle spasms and lockjaw. Diphtheria is a severe bacterial infection that causes a thick covering in the back of your mouth and throat. Get a human papillomavirus (HPV) vaccine  if you are female and aged 23 to 32 or male 23 to 24 and never received it. This vaccine protects you from HPV infection. HPV is the most common infection spread by sexual contact. HPV may also cause vaginal, penile, and anal cancers. Get a pneumococcal vaccine  if you are aged 72 years or older. The pneumococcal vaccine is an injection given to protect you from pneumococcal disease. Pneumococcal disease is an infection caused by pneumococcal bacteria. The infection may cause pneumonia, meningitis, or an ear infection. Get a shingles vaccine  if you are 60 or older, even if you have had shingles before. The shingles vaccine is an injection to protect you from the varicella-zoster virus. This is the same virus that causes chickenpox.  Shingles is a painful rash that develops in people who had chickenpox or have been exposed to the virus. How to eat healthy:  My Plate is a model for planning healthy meals. It shows the types and amounts of foods that should go on your plate. Fruits and vegetables make up about half of your plate, and grains and protein make up the other half. A serving of dairy is included on the side of your plate. The amount of calories and serving sizes you need depends on your age, gender, weight, and height. Examples of healthy foods are listed below:  Eat a variety of vegetables  such as dark green, red, and orange vegetables. You can also include canned vegetables low in sodium (salt) and frozen vegetables without added butter or sauces. Eat a variety of fresh fruits , canned fruit in 100% juice, frozen fruit, and dried fruit. Include whole grains. At least half of the grains you eat should be whole grains. Examples include whole-wheat bread, wheat pasta, brown rice, and whole-grain cereals such as oatmeal.    Eat a variety of protein foods such as seafood (fish and shellfish), lean meat, and poultry without skin (turkey and chicken). Examples of lean meats include pork leg, shoulder, or tenderloin, and beef round, sirloin, tenderloin, and extra lean ground beef. Other protein foods include eggs and egg substitutes, beans, peas, soy products, nuts, and seeds. Choose low-fat dairy products such as skim or 1% milk or low-fat yogurt, cheese, and cottage cheese. Limit unhealthy fats  such as butter, hard margarine, and shortening. Exercise:  Exercise at least 30 minutes per day on most days of the week. Some examples of exercise include walking, biking, dancing, and swimming. You can also fit in more physical activity by taking the stairs instead of the elevator or parking farther away from stores. Include muscle strengthening activities 2 days each week. Regular exercise provides many health benefits.  It helps you manage your weight, and decreases your risk for type 2 diabetes, heart disease, stroke, and high blood pressure. Exercise can also help improve your mood. Ask your healthcare provider about the best exercise plan for you. General health and safety guidelines:   Do not smoke. Nicotine and other chemicals in cigarettes and cigars can cause lung damage. Ask your healthcare provider for information if you currently smoke and need help to quit. E-cigarettes or smokeless tobacco still contain nicotine. Talk to your healthcare provider before you use these products. Limit alcohol. A drink of alcohol is 12 ounces of beer, 5 ounces of wine, or 1½ ounces of liquor. Lose weight, if needed. Being overweight increases your risk of certain health conditions. These include heart disease, high blood pressure, type 2 diabetes, and certain types of cancer. Protect your skin. Do not sunbathe or use tanning beds. Use sunscreen with a SPF 15 or higher. Apply sunscreen at least 15 minutes before you go outside. Reapply sunscreen every 2 hours. Wear protective clothing, hats, and sunglasses when you are outside. Drive safely. Always wear your seatbelt. Make sure everyone in your car wears a seatbelt. A seatbelt can save your life if you are in an accident. Do not use your cell phone when you are driving. This could distract you and cause an accident. Pull over if you need to make a call or send a text message. Practice safe sex. Use latex condoms if are sexually active and have more than one partner. Your healthcare provider may recommend screening tests for sexually transmitted infections (STIs). Wear helmets, lifejackets, and protective gear. Always wear a helmet when you ride a bike or motorcycle, go skiing, or play sports that could cause a head injury. Wear protective equipment when you play sports. Wear a lifejacket when you are on a boat or doing water sports.     © Copyright Thana Giv[employer] 2023 Information is for End User's use only and may not be sold, redistributed or otherwise used for commercial purposes. The above information is an  only. It is not intended as medical advice for individual conditions or treatments. Talk to your doctor, nurse or pharmacist before following any medical regimen to see if it is safe and effective for you.

## 2023-10-16 NOTE — ASSESSMENT & PLAN NOTE
Pt reports continued right LE weakness. He does ambulate with a walker at this visit, which is improved from the wheelchair that he was using at his prior visit. Pt is requesting an electric wheelchair and pool therapy. He does have [facility] come once weekly.     RLE strength: 4/5 at hip; 3/5 at knee; 5/5 at ankle    Plan:  -Refer to [facility] wheelchair clinic  -Referral for PT and pool therapy

## 2023-10-16 NOTE — ASSESSMENT & PLAN NOTE
Pt presents for annual physical exam.  Pt has complaints over leg weakness (weakness on R>L), and pain from recent cervical spine surgery. Pt reports that he was supposed to have his oxycodone refilled, but has run out before his next neurosurgery appointment. Pt also expresses concern that he is being seen at a residency/teaching clinic and wishes to find a new PCP. Plan:  -Given 3 days' refills of oxycodone to bridge until his next neurosurgery appointment on 10/19/23.   -Given instructions on how to find new PCP

## 2023-10-16 NOTE — ASSESSMENT & PLAN NOTE
Pt underwent cervical laminectomy and fusion on 10/4 during hospitalization for failure to thrive. He reports increased pain since running out of his oxycodone. He does have a f/u with neurosurgery scheduled for 10/19/23.     UE strength 5/5  LLE strength 5/5  RLE strength: 4/5 at hip; 3/5 at knee; 5/5 at ankle  Sensation intact of b/l UE and LE    Plan:  -Refilled 3 days' worth of oxycodone to bridge to neurosurgery appointment  -Referred to PT

## 2023-10-16 NOTE — PROGRESS NOTES
[address] [facility] JOSE ALBERTO    NAME: Juve Dan  AGE: 66 y.o. SEX: adult  : 1965     DATE: 10/16/2023     Assessment and Plan:     Problem List Items Addressed This Visit          Other    S/P cervical spinal fusion     Pt underwent cervical laminectomy and fusion on 10/4 during hospitalization for failure to thrive. He reports increased pain since running out of his oxycodone. He does have a f/u with neurosurgery scheduled for 10/19/23. UE strength 5/5  LLE strength 5/5  RLE strength: 4/5 at hip; 3/5 at knee; 5/5 at ankle  Sensation intact of b/l UE and LE    Plan:  -Refilled 3 days' worth of oxycodone to bridge to neurosurgery appointment  -Referred to PT           Relevant Orders    Wheelchair    Ambulatory dysfunction     Pt reports continued right LE weakness. He does ambulate with a walker at this visit, which is improved from the wheelchair that he was using at his prior visit. Pt is requesting an electric wheelchair and pool therapy. He does have [facility] come once weekly. RLE strength: 4/5 at hip; 3/5 at knee; 5/5 at ankle    Plan:  -[facility]er to [facility]  -Referral for PT and pool therapy         Relevant Orders    Ambulatory Referral to Physical Therapy    Wheelchair    Annual physical exam - Primary     Pt presents for annual physical exam.  Pt has complaints over leg weakness (weakness on R>L), and pain from recent cervical spine surgery. Pt reports that he was supposed to have his oxycodone refilled, but has run out before his next neurosurgery appointment. Pt also expresses concern that he is being seen at a residency/teaching clinic and wishes to find a new PCP. Plan:  -Given 3 days' refills of oxycodone to bridge until his next neurosurgery appointment on 10/19/23.   -Given instructions on how to find new PCP              Attempted to discuss immunizations and preventive care screenings today, however, patient deferred information. Counseling:  Attempted to provide, but pt declined need for counseling at this visit         No follow-ups on file. Chief Complaint:     Chief Complaint   Patient presents with    Transition of Care Management     Wants a referral for electric wheelchair and a referral for pool therapy      History of Present Illness:     Adult Annual Physical   Patient here for a comprehensive physical exam. The patient reports  continued RLE weakness, neck pain . Pt states that his ability to walk has improved since cervical spinal laminectomy and fusion on 10/4, despite continued weakness of RLE. He is now ambulating with a rolling walker (as opposed to the wheelchair that he was using at last visit). Pt is concerned about his pain control, as he ran out of his oxycodone that neurosurgery prescribed him. He has an appointment scheduled with neurosurgery on 10/19. He denies chest pain, SOB, dizziness, lightheadedness, headache, N/V/C/D/Abd pain,  symptoms. Pt is further agitated that he is being seen at a residency clinic and requested a "real doctor". Dr. Maximo Torres was asked to perform history and physical, but I was also present in the room. Pt states that residents cannot provide him what he needs and wishes to be seen by another PCP outside of a residency clinic. Pt was also verbally abusive to staff at this visit, and they could not finish rooming the patient or completing their vitals or intake information. Pt declined to answer screening questions at this visit, as he just wishes to get pool therapy referral and an electric wheelchair. Diet and Physical Activity  Diet/Nutrition:  Declines to answer . Exercise: no formal exercise. Depression Screening  PHQ-2/9 Depression Screening           General Health  Sleep:  Declines to answer . Hearing:  Declines to answer . Vision:  Declines to answer . Dental:  Declines to answer .  Health  Symptoms include: Declines to answer    Advanced Care Planning  Do you have an advanced directive? Declines to answer  Do you have a durable medical power of ? Declines to answer       Review of Systems:     Review of Systems   Constitutional:  Negative for activity change, chills, fatigue, fever and unexpected weight change. Eyes:  Negative for visual disturbance. Respiratory:  Negative for cough, chest tightness, shortness of breath, wheezing and stridor. Cardiovascular:  Negative for chest pain, palpitations and leg swelling. Gastrointestinal:  Negative for abdominal pain, constipation, diarrhea, nausea and vomiting. Endocrine: Negative for cold intolerance and heat intolerance. Genitourinary:  Negative for dysuria, flank pain, frequency, hematuria, penile pain and urgency. Musculoskeletal:  Positive for gait problem. Negative for arthralgias and myalgias. Ambulates with rolling walker  Complains of RLE weakness   Skin:  Negative for color change and rash. Healing surgical scar on posterior neck w/o drainage, tenderness, induration   Neurological:  Negative for dizziness, seizures, weakness, light-headedness, numbness and headaches. Psychiatric/Behavioral:  Negative for agitation, dysphoric mood and sleep disturbance.        Past Medical History:     Past Medical History:   Diagnosis Date    Anemia     Anxiety     Arthritis     Asthma     Dermatitis of foot     Last Assessed 1/18/2016    Edentulous     Fracture of fibula     Distal; Last Assessed 7/11/2014    GERD (gastroesophageal reflux disease)     GERD (gastroesophageal reflux disease)     History of fusion of cervical spine     Hyperlipidemia     Low back pain     states on prednisone for a  few days for back    Marijuana smoker, episodic 2/17/2016    Moderate persistent asthma     Last Assessed 7/3/2014    Neurosis, anxiety, panic type     Resolved 8/20/2015    Prepatellar bursitis, right knee     Smoker Tinnitus     Tobacco abuse     Last Assessed 1/18/2016    Wears glasses     reading      Past Surgical History:     Past Surgical History:   Procedure Laterality Date    CERVICAL FUSION      CERVICAL FUSION N/A 10/4/2023    Procedure: C3-6 POSTERIOR FUSION, C4-5 laminectomy;  Surgeon: Jane Mckeon MD;  Location: [facility];  Service: Neurosurgery    NECK SURGERY      Last Assessed 5/02/2017    ND EXCISION PREPATELLAR BURSA Right 3/8/2018    Procedure: EXCISION BURSA PREPATELLAR;  Surgeon: Malcom Landau, MD;  Location: [facility];  Service: Orthopedics    ND SURGICAL ARTHROSCOPY SHOULDER W/ROTATOR CUFF RPR Right 7/24/2017    Procedure: ARTHROSCOPY SHOULDER, ROTATOR CUFF REPAIR,OPEN BICEP TENODESIS;  Surgeon: Malcom Landau, MD;  Location: [facility];  Service: Orthopedics    TOOTH EXTRACTION        Family History:     Family History   Problem Relation Age of Onset    Cancer Mother         ovarian    No Known Problems Father     Diabetes type II Maternal Grandfather       Social History:     Social History     Socioeconomic History    Marital status: Single     Spouse name: Not on file    Number of children: Not on file    Years of education: Not on file    Highest education level: Not on file   Occupational History    Not on file   Tobacco Use    Smoking status: Every Day     Packs/day: 0.50     Years: 10.00     Total pack years: 5.00     Types: Cigarettes    Smokeless tobacco: Never    Tobacco comments:     smoking for 20 years   Vaping Use    Vaping Use: Never used   Substance and Sexual Activity    Alcohol use:  Yes     Alcohol/week: 1.0 standard drink of alcohol     Types: 1 Cans of beer per week     Comment: ocassionally    Drug use: Yes     Types: Marijuana     Comment: medical    Sexual activity: Not on file   Other Topics Concern    Not on file   Social History Narrative    Not on file     Social Determinants of Health     Financial Resource Strain: Low Risk  (9/27/2023)    Overall Financial Resource Strain (CARDIA)     Difficulty of Paying Living Expenses: Not hard at all   Food Insecurity: No Food Insecurity (10/4/2023)    Hunger Vital Sign     Worried About Running Out of Food in the Last Year: Never true     Ran Out of Food in the Last Year: Never true   Transportation Needs: Unmet Transportation Needs (10/4/2023)    PRAPARE - Transportation     Lack of Transportation (Medical): Yes     Lack of Transportation (Non-Medical): Yes   Physical Activity: Inactive (9/27/2023)    Exercise Vital Sign     Days of Exercise per Week: 0 days     Minutes of Exercise per Session: 0 min   Stress: Stress Concern Present (9/27/2023)    [facility]     Feeling of Stress : Very much   Social Connections: Not on file   Intimate Partner Violence: Not At Risk (9/27/2023)    Humiliation, Afraid, Rape, and Kick questionnaire     Fear of Current or Ex-Partner: No     Emotionally Abused: No     Physically Abused: No     Sexually Abused: No   Housing Stability: [address]  (10/4/2023)    Housing Stability Vital Sign     Unable to Pay for Housing in the Last Year: No     Number of [address] in the Last Year: 1     Unstable Housing in the Last Year: No      Current Medications:     Current Outpatient Medications   Medication Sig Dispense Refill    albuterol (VENTOLIN HFA) 90 mcg/act inhaler Inhale 1-2 puffs every 4 (four) hours as needed for wheezing or shortness of breath 1 Inhaler 5    atorvastatin (LIPITOR) 10 mg tablet Take 2 tablets (20 mg total) by mouth daily 28 tablet 0    Incontinence Supply Disposable (Moist Wipes Flushable) MISC Use 6 (six) times a day 180 each 5    omeprazole (PriLOSEC) 40 MG capsule Take 1 capsule (40 mg total) by mouth daily 90 capsule 0    gabapentin (Neurontin) 100 mg capsule Take 1 capsule TID for 7 days, Then take 2 capsule TID for 7 days, Then take 3 capsule TID (Patient not taking: Reported on 10/16/2023) 270 capsule 1    oxyCODONE (Roxicodone) 5 immediate release tablet Take 1 tablet (5 mg total) by mouth every 4 (four) hours as needed for moderate pain or severe pain Max Daily Amount: 30 mg 18 tablet 0    zinc oxide 20 % ointment Apply topically as needed for irritation or dry skin (Patient not taking: Reported on 10/2/2023) 500 g 2     Current Facility-Administered Medications   Medication Dose Route Frequency Provider Last Rate Last Admin    zinc oxide 20 % ointment   Topical PRN Sherie Heal, DO          Allergies:     No Known Allergies   Physical Exam:     There were no vitals taken for this visit. Physical Exam  Vitals and nursing note reviewed. Constitutional:       General: Gabrielle Caraballo is not in acute distress. Appearance: Normal appearance. Gabrielle Caraballo is well-developed. Gabrielle Caraballo is not ill-appearing. HENT:      Head: Normocephalic and atraumatic. Nose: Nose normal.      Mouth/Throat:      Mouth: Mucous membranes are moist.      Pharynx: Oropharynx is clear. Eyes:      Extraocular Movements: Extraocular movements intact. Conjunctiva/sclera: Conjunctivae normal.      Pupils: Pupils are equal, round, and reactive to light. Cardiovascular:      Rate and Rhythm: Normal rate and regular rhythm. Heart sounds: Normal heart sounds. No murmur heard. No friction rub. No gallop. Pulmonary:      Effort: Pulmonary effort is normal. No respiratory distress. Breath sounds: Normal breath sounds. No stridor. No wheezing, rhonchi or rales. Abdominal:      General: Abdomen is flat. Bowel sounds are normal. There is no distension. Palpations: Abdomen is soft. Tenderness: There is no abdominal tenderness. There is no guarding or rebound. Musculoskeletal:      Right shoulder: Normal. Normal range of motion. Left shoulder: Normal. Normal range of motion. Right upper arm: Normal.      Left upper arm: Normal.      Right elbow: Normal. Normal range of motion.       Left elbow: Normal. Normal range of motion. Right forearm: Normal.      Left forearm: Normal.      Right wrist: Normal. Normal range of motion. Left wrist: Normal. Normal range of motion. Right hand: Normal. Normal range of motion. Left hand: Normal. Normal range of motion. Cervical back: Normal range of motion and neck supple. Right hip: Normal range of motion. Left hip: Normal.      Left upper leg: Normal.      Right knee: Normal range of motion. Left knee: Normal.      Right lower leg: No edema. Left lower leg: Normal. No edema. Right ankle: Normal. Normal range of motion. Left ankle: Normal. Normal range of motion. Right foot: Normal.      Left foot: Normal.      Comments: RLE: 4/5 strength at hip; 3/5 strength at knee; 5/5 strength at ankle  LLE: 5/5 strength   Skin:     General: Skin is warm and dry. Capillary Refill: Capillary refill takes less than 2 seconds. Neurological:      Mental Status: Vinitale Bran is alert and oriented to person, place, and time. Cranial Nerves: No cranial nerve deficit. Sensory: No sensory deficit. Psychiatric:         Attention and Perception: Attention and perception normal.         Behavior: Behavior is uncooperative and agitated. Judgment: Judgment is impulsive.       Comments: Agitated throughout exam  Needed frequent redirection          DO Julee Martins

## 2023-10-19 ENCOUNTER — TELEPHONE (OUTPATIENT)
Dept: NEUROSURGERY | Facility: CLINIC | Age: 58
End: 2023-10-19

## 2023-10-19 ENCOUNTER — OFFICE VISIT (OUTPATIENT)
Dept: NEUROSURGERY | Facility: CLINIC | Age: 58
End: 2023-10-19

## 2023-10-19 VITALS
HEIGHT: 63 IN | SYSTOLIC BLOOD PRESSURE: 132 MMHG | WEIGHT: 137 LBS | BODY MASS INDEX: 24.27 KG/M2 | TEMPERATURE: 98.2 F | OXYGEN SATURATION: 96 % | RESPIRATION RATE: 17 BRPM | DIASTOLIC BLOOD PRESSURE: 86 MMHG | HEART RATE: 80 BPM

## 2023-10-19 DIAGNOSIS — M47.12 OTHER SPONDYLOSIS WITH MYELOPATHY, CERVICAL REGION: Primary | ICD-10-CM

## 2023-10-19 PROCEDURE — 99024 POSTOP FOLLOW-UP VISIT: CPT | Performed by: STUDENT IN AN ORGANIZED HEALTH CARE EDUCATION/TRAINING PROGRAM

## 2023-10-19 RX ORDER — ATORVASTATIN CALCIUM 20 MG/1
20 TABLET, FILM COATED ORAL EVERY EVENING
COMMUNITY
Start: 2023-07-22

## 2023-10-19 NOTE — PROGRESS NOTES
Assessment/Plan:  79-year-old male presents to clinic for 2-week postop check from C3-6 decompression and fusion for cervical myelopathy. Staples removed today. His incision is clean dry and intact. He has made dramatic improvement already and has greatly improved strength in his arms and legs, improve  strength, improved sensation, and is walking with a walker. Follow-up in 4 weeks for 6-week postop check with x-ray cervical spine. Reason for Visit:  Post-op       HPI     Review of Systems   Constitutional:  Positive for fatigue (due to pain - gotten better). HENT: Negative. Eyes: Negative. Respiratory: Negative. Cardiovascular: Negative. Gastrointestinal: Negative. Uses adult diapers   Endocrine: Negative. Musculoskeletal:  Positive for back pain ("ghosting"), gait problem (uses walking device) and neck pain (shoulders into arms). Geiger himself and doesn't feel it   Skin: Negative. Allergic/Immunologic: Negative. Neurological:  Positive for weakness (hands) and numbness (hands). Hematological: Negative.          /86 (BP Location: Left arm, Patient Position: Sitting, Cuff Size: Standard)   Pulse 80   Temp 98.2 °F (36.8 °C) (Temporal)   Resp 17   Ht 5' 3" (1.6 m)   Wt 62.1 kg (137 lb)   SpO2 96%   BMI 24.27 kg/m²        Current Outpatient Medications:     albuterol (VENTOLIN HFA) 90 mcg/act inhaler, Inhale 1-2 puffs every 4 (four) hours as needed for wheezing or shortness of breath, Disp: 1 Inhaler, Rfl: 5    atorvastatin (LIPITOR) 10 mg tablet, Take 2 tablets (20 mg total) by mouth daily, Disp: 28 tablet, Rfl: 0    atorvastatin (LIPITOR) 20 mg tablet, Take 20 mg by mouth every evening, Disp: , Rfl:     Incontinence Supply Disposable (Moist Wipes Flushable) MISC, Use 6 (six) times a day, Disp: 180 each, Rfl: 5    omeprazole (PriLOSEC) 40 MG capsule, Take 1 capsule (40 mg total) by mouth daily, Disp: 90 capsule, Rfl: 0    oxyCODONE (Roxicodone) 5 immediate release tablet, Take 1 tablet (5 mg total) by mouth every 4 (four) hours as needed for moderate pain or severe pain Max Daily Amount: 30 mg, Disp: 18 tablet, Rfl: 0    zinc oxide 20 % ointment, Apply topically as needed for irritation or dry skin (Patient not taking: Reported on 10/2/2023), Disp: 500 g, Rfl: 2    Current Facility-Administered Medications:     zinc oxide 20 % ointment, , Topical, PRN, Alex Seip, DO    Past Medical History:   Diagnosis Date    Anemia     Anxiety     Arthritis     Asthma     Dermatitis of foot     Last Assessed 1/18/2016    Edentulous     Fracture of fibula     Distal; Last Assessed 7/11/2014    GERD (gastroesophageal reflux disease)     GERD (gastroesophageal reflux disease)     History of fusion of cervical spine     Hyperlipidemia     Low back pain     states on prednisone for a  few days for back    Marijuana smoker, episodic 2/17/2016    Moderate persistent asthma     Last Assessed 7/3/2014    Neurosis, anxiety, panic type     Resolved 8/20/2015    Prepatellar bursitis, right knee     Smoker     Tinnitus     Tobacco abuse     Last Assessed 1/18/2016    Wears glasses     reading      Past Surgical History:   Procedure Laterality Date    CERVICAL FUSION      CERVICAL FUSION N/A 10/4/2023    Procedure: C3-6 POSTERIOR FUSION, C4-5 laminectomy;  Surgeon: Meliza Gamboa MD;  [facility];  Service: Neurosurgery    NECK SURGERY      Last Assessed 5/02/2017    AL EXCISION PREPATELLAR BURSA Right 3/8/2018    Procedure: EXCISION BURSA PREPATELLAR;  Surgeon: Vandana Harper MD;  Location[facility];  Service: Orthopedics    AL SURGICAL ARTHROSCOPY SHOULDER W/ROTATOR CUFF RPR Right 7/24/2017    Procedure: ARTHROSCOPY SHOULDER, ROTATOR CUFF REPAIR,OPEN BICEP TENODESIS;  Surgeon: Vandana Harper MD;  Location[facility];  Service: Orthopedics    TOOTH EXTRACTION       Social History     Socioeconomic History    Marital status: Single     Spouse name: Not on file    Number of children: Not on file    Years of education: Not on file    Highest education level: Not on file   Occupational History    Not on file   Tobacco Use    Smoking status: Every Day     Packs/day: 0.50     Years: 10.00     Total pack years: 5.00     Types: Cigarettes    Smokeless tobacco: Never    Tobacco comments:     smoking for 20 years   Vaping Use    Vaping Use: Never used   Substance and Sexual Activity    Alcohol use: Not Currently     Alcohol/week: 1.0 standard drink of alcohol     Types: 1 Cans of beer per week    Drug use: Yes     Types: Marijuana     Comment: medical    Sexual activity: Not on file   Other Topics Concern    Not on file   Social History Narrative    Not on file     Social Determinants of Health     Financial Resource Strain: Low Risk  (9/27/2023)    Overall Financial Resource Strain (CARDIA)     Difficulty of Paying Living Expenses: Not hard at all   Food Insecurity: No Food Insecurity (10/4/2023)    Hunger Vital Sign     Worried About Running Out of Food in the Last Year: Never true     Ran Out of Food in the Last Year: Never true   Transportation Needs: Unmet Transportation Needs (10/4/2023)    PRAPARE - Transportation     Lack of Transportation (Medical): Yes     Lack of Transportation (Non-Medical): Yes   Physical Activity: Inactive (9/27/2023)    Exercise Vital Sign     Days of Exercise per Week: 0 days     Minutes of Exercise per Session: 0 min   Stress: Stress Concern Present (9/27/2023)    [facility]     Feeling of Stress : Very much   Social Connections: Not on file   Intimate Partner Violence: Not At Risk (9/27/2023)    Humiliation, Afraid, Rape, and Kick questionnaire     Fear of Current or Ex-Partner: No     Emotionally Abused: No     Physically Abused: No     Sexually Abused: No   Housing Stability: Low Risk  (10/4/2023)    Housing Stability Vital Sign     Unable to Pay for Housing in the Last Year: No     Number of Places Lived in the Last Year: 1     Unstable Housing in the Last Year: No       The following portions of the patient's history were reviewed and updated as appropriate: allergies, current medications, past family history, past medical history, past social history, past surgical history, and problem list.    Physical Exam       A&OX3  Gaze conjugate  EOMI  FS  TM  Fcx4  Bue 4, slightly weaker on left  Ble 4+, slightly weaker on left  Decreased sensation to light touch in bue hands  Posterior cervical incision c/d/i

## 2023-10-19 NOTE — TELEPHONE ENCOUNTER
10/19/2023-PT CALLED BACK AND IS ADDED ON W/ALBERT TODAY AT 2:00pm    10/19/2023-CALLED PT TO RESCHEDULE TODAY'S "NO SHOW" APT, HE ADVISED HE WAS WAITING FOR HIS TRANSPORTATION AND AGREED OUR OFFICE CAN CONTACT HIS  (SARAH BETH).   CALLED SARAH BETH (), WHO SAID HE CAN TAKE PT FOR APT TODAY W/ALBERT AT 2:00pm, BUT WOULD LIKE TO TALK TO PT FIRST BEFORE SCHEDULING.  10/19/2023 AT 2:00pm W/ALBERT APT PLACED ON HOLD    **WAITING FOR PT OR SARAH BETH TO CALL OFFICE BACK TO SCHEDULE**

## 2023-10-23 ENCOUNTER — TELEPHONE (OUTPATIENT)
Dept: FAMILY MEDICINE CLINIC | Facility: CLINIC | Age: 58
End: 2023-10-23

## 2023-10-23 NOTE — TELEPHONE ENCOUNTER
Kenya Rawls from [address] [place] [address] [employer] [place] contact our office stating they received the order for the wheel chair but need a diagnosis code that support the order. After my conversation with Marylee Asters our nurse she advise to fax over the encounter note from 10/16/2023 that contain the explanation of why the patient is in need of the wheel chair. Order and encounter notes faxed over to [phone] and confirmation received. frooly phone number is [phone] if they need to be contacted with any question.

## 2023-10-25 ENCOUNTER — TELEPHONE (OUTPATIENT)
Age: 58
End: 2023-10-25

## 2023-10-25 DIAGNOSIS — Z98.1 S/P CERVICAL SPINAL FUSION: ICD-10-CM

## 2023-10-25 RX ORDER — OXYCODONE HYDROCHLORIDE 5 MG/1
5 TABLET ORAL EVERY 4 HOURS PRN
Qty: 30 TABLET | Refills: 0 | Status: SHIPPED | OUTPATIENT
Start: 2023-10-25 | End: 2023-10-30

## 2023-10-25 NOTE — TELEPHONE ENCOUNTER
Received a call from patient requesting a refill of his medication. Patient stated he had finished the previous script from our office and his PCP had given him 3 days until he was seen for his 2 week postop visit. Patient stated he has now run out and is requesting for a refill. He reports still having significant pain in his neck. Patient originally requested for a stronger pain medication. This RN advised unfortunately we will not be able to provide a stronger medication but we could provide a refill of the oxycodone. Also suggested patient take tylenol 1000 mg Q 8 to help with his pain. He stated an understanding. Confirmed his preferred pharmacy. Patient stated he is scheduled to be seen by PM on 10/30. He was appreciative of the call.

## 2023-10-25 NOTE — TELEPHONE ENCOUNTER
Caller: pt    Doctor: alejandrina    Reason for call: pt called b/c he missed his appt with dr Nora Valladares and needed to r/s. I gave him the option of 10-30-23 at 7 am and then the next available was at the end of November. Pt proceeded to say that is ridiculous and he was going to have his spine surgeon call us. Pt was rude and yelling. I let him know I could get him in earlier if he wanted to go to [place] so I made him an appt with dr tinoco for 10-30-23 at noon.     Call back#: [phone]

## 2023-10-26 ENCOUNTER — TELEPHONE (OUTPATIENT)
Dept: NEUROSURGERY | Facility: CLINIC | Age: 58
End: 2023-10-26

## 2023-10-26 NOTE — TELEPHONE ENCOUNTER
Call received from Cooper Hernandez with [address] stating [facility] contacted them regarding diagnosis codes for oxycodone order. She provided their contact number. Reached out to [facility] and spoke with Esdras. She states she is looking for diagnosis codes for incontinence supplies not oxycodone. Directed her to contact the ordering provider Dr. Raz Case. Provided their contact number. She was appreciative.

## 2023-10-26 NOTE — TELEPHONE ENCOUNTER
Mo Canales from [address] [place] [address] called stating the diagnosis code used in the script is not supported to qualify and need to be a specific code used. Please review.

## 2023-10-27 NOTE — TELEPHONE ENCOUNTER
I also had sent him a referral for the [facility] Wheelchair clinic for evaluation, having anticipated that there may be issues. They should know if he qualifies and how to code it.

## 2023-10-30 ENCOUNTER — TELEPHONE (OUTPATIENT)
Dept: NEUROSURGERY | Facility: CLINIC | Age: 58
End: 2023-10-30

## 2023-10-30 NOTE — TELEPHONE ENCOUNTER
PT called regarding hi Oxy refill he used a lot of profanity, referring to the office as m. ...f...ckers more than once, I allowed him to express his frustration for more than 2 minutes and when he came up for air  I advised him that profanity on the phone was not going to be tolerated and I needed to confirm some info in order to help him. He then stated he was putting his nurse on the phone but he continued to  be the one I spoke with. He had been communicating with nursing last week on this issue so I advised him I would transfer and they would help him .  He acknowledged that he was transferring and I transferred the call

## 2023-10-30 NOTE — TELEPHONE ENCOUNTER
Received a call from a woman on the nurseline requesting for a script. Attempted to return call however went to , left message encouraging a call back if needed.  Provided office number,

## 2023-10-31 ENCOUNTER — TELEPHONE (OUTPATIENT)
Dept: FAMILY MEDICINE CLINIC | Facility: CLINIC | Age: 58
End: 2023-10-31

## 2023-10-31 ENCOUNTER — TELEPHONE (OUTPATIENT)
Dept: PAIN MEDICINE | Facility: CLINIC | Age: 58
End: 2023-10-31

## 2023-10-31 NOTE — TELEPHONE ENCOUNTER
LVM for pt letting him know his appt is getting cancelled due to him being post op. At this time, there is no injection he can be given in his neck and we do not prescribe opioids. If patient would like to be seen for other issues related to his back/spine, we will more than happy to do a consult. At this time, his appt is cancelled.

## 2023-11-02 ENCOUNTER — TELEPHONE (OUTPATIENT)
Dept: FAMILY MEDICINE CLINIC | Facility: CLINIC | Age: 58
End: 2023-11-02

## 2023-11-02 NOTE — TELEPHONE ENCOUNTER
Robert Peterson from [employer] called on 11/2 @2:17pm. The prescription received is not complete. Dx code was not written on prescription. No refills (if any) need to be circled.

## 2023-11-07 NOTE — TELEPHONE ENCOUNTER
Faxed physical therapy form, (outpatient certification/summary letter) to [address] [place] [address] signed and dated by Dr. Samira Hardwick. Received confirmation. Please scan into patient chart.  Thank you

## 2023-11-09 ENCOUNTER — PATIENT OUTREACH (OUTPATIENT)
Dept: FAMILY MEDICINE CLINIC | Facility: CLINIC | Age: 58
End: 2023-11-09

## 2023-11-09 DIAGNOSIS — Z98.1 S/P CERVICAL SPINAL FUSION: ICD-10-CM

## 2023-11-09 RX ORDER — OXYCODONE HYDROCHLORIDE 5 MG/1
5 TABLET ORAL EVERY 6 HOURS PRN
Qty: 20 TABLET | Refills: 0 | Status: SHIPPED | OUTPATIENT
Start: 2023-11-09 | End: 2023-11-16 | Stop reason: SDUPTHER

## 2023-11-09 NOTE — TELEPHONE ENCOUNTER
Received a call from Vinny Bui, patient's  stating patient is requesting a refill of his oxycodone. Returned call and spoke to both patient and Vinny Bui. Patient stated he is in need of a refill of the medication. He reports he did reach out to PM however they are only offering him injections and informed him to reach out to our office since he is still postop. Advised patient our office can prescribe for about 6 weeks postop. He stated he has been taking the medication as prescribed which is helping his pain. It is appropriate for a refill based on last script. Confirmed his preferred pharmacy. Informed patient the frequency will be changed from Q 4 to Q 6. He stated an understanding and was appreciative of the call back.

## 2023-11-09 NOTE — PROGRESS NOTES
Chart review completed. Per chart review and IB, pt was not admitted for [facility] as of 10/12 due to pt reports of doing well. Per chart and IB, pt declined needing PT or OT and only wanting a power scooter for community mobility. Per chart, pt was re-referred to PT and an electric wheelchair was ordered by Dr. Maureen Harding on 10/16 due to continued weakness. Per chart, pt was also referred to [facility]chair clinic for an evaluation for the DME. SW needs were met as referral was placed for PT HHA and pt declined. Pt is being followed by [facility] and FP PCP. Pt is also working with a , Rebeca Keys who assists daily. SW available to assist if future needs arise.

## 2023-11-13 ENCOUNTER — TELEPHONE (OUTPATIENT)
Dept: NEUROSURGERY | Facility: CLINIC | Age: 58
End: 2023-11-13

## 2023-11-13 NOTE — TELEPHONE ENCOUNTER
Received a call from patient regarding his oxycodone script ordered last week. Patient stated he went to  his script of 20 tablets of oxycodone however the pharmacy did not have the full quantity and was only able to provide the patient with 10 tablets and voided the rest of the script. Patient stated he no longer wants to use the [facility] pharmacy and provided a new pharmacy-- [facility] on [address] [place] [address] in [facility]. Advised this RN will update his preference. He stated he still has the 10 tablet supply and will reach out to the office when a refill is needed. This RN reached out to the [facility] pharmacy and confirmed the above. They also stated they are still out of the oxycodone supply. Patient was appreciative.

## 2023-11-14 ENCOUNTER — TELEPHONE (OUTPATIENT)
Dept: FAMILY MEDICINE CLINIC | Facility: CLINIC | Age: 58
End: 2023-11-14

## 2023-11-14 NOTE — TELEPHONE ENCOUNTER
Signature Required    Dr MENESES [facility].  Physician prescription and statement of medical necessity    Ana folder    Fax [phone]

## 2023-11-16 DIAGNOSIS — Z98.1 S/P CERVICAL SPINAL FUSION: ICD-10-CM

## 2023-11-16 RX ORDER — OXYCODONE HYDROCHLORIDE 5 MG/1
5 TABLET ORAL 2 TIMES DAILY PRN
Qty: 20 TABLET | Refills: 0 | Status: SHIPPED | OUTPATIENT
Start: 2023-11-16 | End: 2023-11-26

## 2023-11-16 NOTE — TELEPHONE ENCOUNTER
Patient requested to speak with me. Mr. Erika Rockwell expressed frustration with the staff member he was speaking with, he didn't understand why we would change him to 2 per day when he is taking 6 per day. I advised the patient that our office policy only allows our providers to prescribe pain medication for the first 6 weeks. If medication is warranted after this he would need to speak with Pain Management or his family doctor. He explained that he was seen by pain management, they are only offering injections. He does not want to have needles stuck in his spine. He requested I review his request to keep his pain RX at his current does with Dr. Dinora Vera and call him back. I reviewed this with Dr. Shad Dumas, as Dr. Dinora Vera is unavailable. Per Dr. Shad Dumas, we will continue to follow our office policy. The RX is to be called in at Q12 or if the patient wishes we can cancel the Rx. Called the patient back to let him know Dr. Dinora Vera is unavailable and I reviewed his request with Dr. Shad Dumas our Chief of Neurosurgery. I offered to move his appointment up to next Tuesday or Wednesday with Dr. Dinora Vera, but he is hospital doc so there may be a last minute change if something comes in. Patient expressed understanding and requested to keep his appointment for 11/27.

## 2023-11-16 NOTE — TELEPHONE ENCOUNTER
Received a call from patient berenice on the phone regarding refill of medications. Patient stated he called on 11/13 and a refill was never sent to his pharmacy. Informed him during that discussion he informed this RN he still had the 10 tablets. Advised once he was done with that amount a refill would be discussed at that time. Patient began to yell stating he wanted a refill on 11/13. Offered to refill the medication one last time at Q 12 d/t patient already being 6 weeks out from surgery however his 6 week appt is not for another 1.5 weeks. Patient continued to yell and demanded to speak to the  stating he will not accept a refill of the medication at Q 12. NB spoke to patient and Dr Parminder Ravi who agreed patient may have one last refill of oxy Q 12 until the appt.  No further refills will be provided and patient will need to be seen by his PCP or PM.

## 2023-11-21 ENCOUNTER — TELEPHONE (OUTPATIENT)
Dept: FAMILY MEDICINE CLINIC | Facility: CLINIC | Age: 58
End: 2023-11-21

## 2023-11-21 NOTE — TELEPHONE ENCOUNTER
Dr. Tahir Faria completed forms on 11/21/23  Faxed to [phone]  Placed in red clerical folder to be scanned to chart

## 2023-11-21 NOTE — TELEPHONE ENCOUNTER
Folder (To be completed by) -  Dr. Sung Greene [facility])     Name of Form - Physician Prescription & Statement of Medical Necessity    Color folder (Red)    Form to be Faxed (Fax #), [phone]     Patient was made aware of the 7-10 business day form policy.

## 2023-11-27 ENCOUNTER — TELEPHONE (OUTPATIENT)
Dept: NEUROSURGERY | Facility: CLINIC | Age: 58
End: 2023-11-27

## 2023-11-27 ENCOUNTER — HOSPITAL ENCOUNTER (OUTPATIENT)
Dept: RADIOLOGY | Facility: HOSPITAL | Age: 58
Discharge: HOME | End: 2023-11-27

## 2023-11-27 DIAGNOSIS — M47.12 CERVICAL SPONDYLOSIS WITH MYELOPATHY: ICD-10-CM

## 2023-11-27 NOTE — TELEPHONE ENCOUNTER
11/27/2023- PER  RADIOLOGY, PT DID NOT WANT TO WAIT TO GET XRAY DONE AND LEFT. PT WAS MARKED "NO SHOW" SINCE HE DID NOT COME BACK OR CALL OFFICE. LEFT MESSAGE ON MACHINE TO RESCHEDULE APT AND ADVISE XRAY STILL NEEDED.

## 2023-12-02 NOTE — TELEPHONE ENCOUNTER
Received call from [address] James from [address] [place] [address] stating "I just wanted to inform you that we received the prescription from the doctor's office on November 24th. However, the diagnosis code that was written on the prescription does not support the needs of the patient and recently the specific code for that. And if you have any question, kindly give us a call at [phone] or you can refax it to [phone]. Thank you and have a nice day". Warner Seay re-printed and placed in RED clinical folder for Dr. Ralph Gallardo.

## 2023-12-04 DIAGNOSIS — E78.5 DYSLIPIDEMIA: Primary | ICD-10-CM

## 2023-12-04 DIAGNOSIS — M70.41 PREPATELLAR BURSITIS OF RIGHT KNEE: ICD-10-CM

## 2023-12-04 DIAGNOSIS — K21.9 GERD WITHOUT ESOPHAGITIS: ICD-10-CM

## 2023-12-04 DIAGNOSIS — K21.9 GASTROESOPHAGEAL REFLUX DISEASE WITHOUT ESOPHAGITIS: ICD-10-CM

## 2023-12-04 RX ORDER — ATORVASTATIN CALCIUM 20 MG/1
20 TABLET, FILM COATED ORAL EVERY EVENING
Qty: 30 TABLET | Refills: 0 | Status: SHIPPED | OUTPATIENT
Start: 2023-12-04 | End: 2024-01-03

## 2023-12-04 RX ORDER — OMEPRAZOLE 40 MG/1
40 CAPSULE, DELAYED RELEASE ORAL DAILY
Qty: 90 CAPSULE | Refills: 0 | Status: SHIPPED | OUTPATIENT
Start: 2023-12-04

## 2023-12-04 RX ORDER — ALBUTEROL SULFATE 90 UG/1
1-2 AEROSOL, METERED RESPIRATORY (INHALATION) EVERY 4 HOURS PRN
Qty: 6.7 G | Refills: 0 | Status: SHIPPED | OUTPATIENT
Start: 2023-12-04

## 2023-12-04 NOTE — TELEPHONE ENCOUNTER
Patient would like his meds called into  [facility] [phone]. Patient will be transferring out.

## 2023-12-05 NOTE — TELEPHONE ENCOUNTER
12/5/23 - CALLED PT AND OFFERED TO RESCHEDULE 11/27 "NO SHOW" APT AND ALSO ADVISED CERVICAL SPINE XR NEEDED PRIOR TO APT. PT STATES WHEN HE WENT DOWN TO [facility] RADIOLOGY TO HAVE XR DONE, HE SAT THERE FOR 1 HR AND COULD NOT WAIT DUE TO HIS TRANSPORTATION. PT WAS ADVISED HE CAN GO TO ANY [facility] TO HAVE XR COMPLETED AND THEN WE CAN RESCHEDULE APT W/ALBERT. PT REQUESTING FOR DR. PRICE TO SCHEDULE XR FOR HIM SO HE DID NOT HAVE TO WAIT. PT WAS ADVISED AGAIN XR'S ARE WALK IN AND NO APT IS NEEDED. NB MADE AWARE.

## 2023-12-06 ENCOUNTER — TELEPHONE (OUTPATIENT)
Dept: FAMILY MEDICINE CLINIC | Facility: CLINIC | Age: 58
End: 2023-12-06

## 2023-12-06 NOTE — TELEPHONE ENCOUNTER
Signature required.     Folder (to be completed by): Dr. Sung Greene     Name of Form: PT Outpatient Certification Encounter #[id number]    Color Folder: Rodney Rainey    Form to be faxed to:  [phone]

## 2023-12-06 NOTE — TELEPHONE ENCOUNTER
Signature required.     Folder (to be completed by): Dr. Sascha Ordoñez    Name of Form: Physician's Prescription and Statement of Medical Necessity    Color Folder: Perry Victoria    Form to be faxed to: [phone]

## 2023-12-07 ENCOUNTER — TELEPHONE (OUTPATIENT)
Dept: FAMILY MEDICINE CLINIC | Facility: CLINIC | Age: 58
End: 2023-12-07

## 2023-12-07 DIAGNOSIS — R26.2 AMBULATORY DYSFUNCTION: Primary | ICD-10-CM

## 2023-12-07 NOTE — TELEPHONE ENCOUNTER
Signature required:  Dr. Fabi Kee    From:  [facility]    Fax # [phone]    Folder:  [facility]

## 2023-12-07 NOTE — TELEPHONE ENCOUNTER
Sinan from [facility], to inform Dr Luis Armando Brown of patients last few PT No Show appt's. Caller contacted patient to find the reason for No Show appt's. Patient stated, he was okay with home exercises suggested by PT was enough. Caller tried convincing patient the need for in office PT or to possibly try Aquatic exercises   and how he would probably benefit more from the treatment. Caller is requesting the orders for Aquatics care due to lack of stability, balance and strengthening. Also wanted Dr Luis Armando Brown to reach out to patient to reinforce the need for PT or Aquatic therapy.

## 2023-12-11 ENCOUNTER — APPOINTMENT (OUTPATIENT)
Dept: RADIOLOGY | Age: 58
End: 2023-12-11
Payer: COMMERCIAL

## 2023-12-11 PROCEDURE — 72040 X-RAY EXAM NECK SPINE 2-3 VW: CPT

## 2023-12-18 ENCOUNTER — TELEPHONE (OUTPATIENT)
Dept: FAMILY MEDICINE CLINIC | Facility: CLINIC | Age: 58
End: 2023-12-18

## 2023-12-27 ENCOUNTER — TELEPHONE (OUTPATIENT)
Dept: FAMILY MEDICINE CLINIC | Facility: CLINIC | Age: 58
End: 2023-12-27

## 2023-12-27 NOTE — TELEPHONE ENCOUNTER
Signature required    Folder (to be completed by): Dr. Ni    Name of Form: [employer] Letter of Medical Necessity (refers to encounter #[id number] from [facility])    Color Folder: Gwen    Form to be faxed to:  [phone]

## 2024-01-17 ENCOUNTER — OFFICE VISIT (OUTPATIENT)
Age: 59
End: 2024-01-17

## 2024-01-17 VITALS
BODY MASS INDEX: 23.67 KG/M2 | SYSTOLIC BLOOD PRESSURE: 122 MMHG | TEMPERATURE: 97.1 F | OXYGEN SATURATION: 97 % | HEIGHT: 63 IN | DIASTOLIC BLOOD PRESSURE: 70 MMHG | WEIGHT: 133.6 LBS | HEART RATE: 76 BPM

## 2024-01-17 DIAGNOSIS — Z72.0 TOBACCO ABUSE: Primary | ICD-10-CM

## 2024-01-17 DIAGNOSIS — J45.909 UNCOMPLICATED ASTHMA, UNSPECIFIED ASTHMA SEVERITY, UNSPECIFIED WHETHER PERSISTENT: ICD-10-CM

## 2024-01-17 DIAGNOSIS — J44.1 ACUTE EXACERBATION OF CHRONIC OBSTRUCTIVE PULMONARY DISEASE (COPD) (HCC): ICD-10-CM

## 2024-01-17 DIAGNOSIS — K21.9 GERD WITHOUT ESOPHAGITIS: ICD-10-CM

## 2024-01-17 DIAGNOSIS — F31.60 BIPOLAR DISORDER, MIXED (HCC): ICD-10-CM

## 2024-01-17 DIAGNOSIS — E78.5 DYSLIPIDEMIA: ICD-10-CM

## 2024-01-17 DIAGNOSIS — Z79.899 MEDICAL MARIJUANA USE: ICD-10-CM

## 2024-01-17 DIAGNOSIS — E55.9 VITAMIN D DEFICIENCY: ICD-10-CM

## 2024-01-17 DIAGNOSIS — K21.9 GASTROESOPHAGEAL REFLUX DISEASE WITHOUT ESOPHAGITIS: ICD-10-CM

## 2024-01-17 DIAGNOSIS — M70.41 PREPATELLAR BURSITIS OF RIGHT KNEE: ICD-10-CM

## 2024-01-17 PROBLEM — M54.2 NECK PAIN: Status: RESOLVED | Noted: 2017-06-05 | Resolved: 2024-01-17

## 2024-01-17 PROBLEM — R00.2 PALPITATION: Status: RESOLVED | Noted: 2018-09-21 | Resolved: 2024-01-17

## 2024-01-17 PROBLEM — G89.29 CHRONIC RIGHT-SIDED LOW BACK PAIN WITHOUT SCIATICA: Status: RESOLVED | Noted: 2018-05-16 | Resolved: 2024-01-17

## 2024-01-17 PROBLEM — M50.00 HNP (HERNIATED NUCLEUS PULPOSUS) WITH MYELOPATHY, CERVICAL: Status: RESOLVED | Noted: 2017-02-06 | Resolved: 2024-01-17

## 2024-01-17 PROBLEM — R62.7 FAILURE TO THRIVE IN ADULT: Status: RESOLVED | Noted: 2023-10-03 | Resolved: 2024-01-17

## 2024-01-17 PROBLEM — R26.2 AMBULATORY DYSFUNCTION: Status: RESOLVED | Noted: 2023-09-27 | Resolved: 2024-01-17

## 2024-01-17 PROBLEM — R15.9 FECAL INCONTINENCE: Status: RESOLVED | Noted: 2023-09-27 | Resolved: 2024-01-17

## 2024-01-17 PROBLEM — M54.50 LOW BACK PAIN: Status: RESOLVED | Noted: 2018-01-05 | Resolved: 2024-01-17

## 2024-01-17 PROBLEM — L98.9 NON-HEALING SKIN LESION: Status: RESOLVED | Noted: 2018-12-05 | Resolved: 2024-01-17

## 2024-01-17 PROBLEM — R23.8 SKIN BREAKDOWN: Status: RESOLVED | Noted: 2023-09-27 | Resolved: 2024-01-17

## 2024-01-17 PROBLEM — M54.50 CHRONIC RIGHT-SIDED LOW BACK PAIN WITHOUT SCIATICA: Status: RESOLVED | Noted: 2018-05-16 | Resolved: 2024-01-17

## 2024-01-17 PROBLEM — B86 SCABIES: Status: RESOLVED | Noted: 2019-01-25 | Resolved: 2024-01-17

## 2024-01-17 PROBLEM — M47.12 CERVICAL SPONDYLOSIS WITH MYELOPATHY: Status: RESOLVED | Noted: 2023-10-05 | Resolved: 2024-01-17

## 2024-01-17 PROBLEM — R32 URINARY INCONTINENCE: Status: RESOLVED | Noted: 2023-09-27 | Resolved: 2024-01-17

## 2024-01-17 RX ORDER — OMEPRAZOLE 40 MG/1
40 CAPSULE, DELAYED RELEASE ORAL DAILY
Qty: 5 CAPSULE | Refills: 5 | Status: SHIPPED | OUTPATIENT
Start: 2024-01-17

## 2024-01-17 RX ORDER — ATORVASTATIN CALCIUM 20 MG/1
20 TABLET, FILM COATED ORAL EVERY EVENING
Qty: 30 TABLET | Refills: 5 | Status: SHIPPED | OUTPATIENT
Start: 2024-01-17 | End: 2024-07-15

## 2024-01-17 RX ORDER — QUETIAPINE FUMARATE 200 MG/1
TABLET, FILM COATED ORAL
COMMUNITY
Start: 2024-01-10 | End: 2024-01-17

## 2024-01-17 RX ORDER — HYDROXYZINE 50 MG/1
50 TABLET, FILM COATED ORAL EVERY 8 HOURS PRN
Qty: 30 TABLET | Refills: 5 | Status: SHIPPED | OUTPATIENT
Start: 2024-01-17

## 2024-01-17 RX ORDER — QUETIAPINE FUMARATE 300 MG/1
300 TABLET, FILM COATED ORAL
Qty: 30 TABLET | Refills: 5 | Status: SHIPPED | OUTPATIENT
Start: 2024-01-17 | End: 2024-01-17 | Stop reason: SDUPTHER

## 2024-01-17 RX ORDER — PREDNISONE 20 MG/1
40 TABLET ORAL DAILY
Qty: 10 TABLET | Refills: 0 | Status: SHIPPED | OUTPATIENT
Start: 2024-01-17 | End: 2024-01-22

## 2024-01-17 RX ORDER — HYDROXYZINE 50 MG/1
50 TABLET, FILM COATED ORAL EVERY 8 HOURS PRN
COMMUNITY
Start: 2024-01-12 | End: 2024-01-17 | Stop reason: SDUPTHER

## 2024-01-17 RX ORDER — QUETIAPINE FUMARATE 300 MG/1
300 TABLET, FILM COATED ORAL
COMMUNITY
Start: 2023-12-14 | End: 2024-01-17

## 2024-01-17 RX ORDER — AZITHROMYCIN 250 MG/1
TABLET, FILM COATED ORAL DAILY
Qty: 6 TABLET | Refills: 0 | Status: SHIPPED | OUTPATIENT
Start: 2024-01-17 | End: 2024-01-22

## 2024-01-17 RX ORDER — QUETIAPINE FUMARATE 300 MG/1
300 TABLET, FILM COATED ORAL
Qty: 30 TABLET | Refills: 5 | Status: SHIPPED | OUTPATIENT
Start: 2024-01-17

## 2024-01-17 RX ORDER — ALBUTEROL SULFATE 90 UG/1
1-2 AEROSOL, METERED RESPIRATORY (INHALATION) EVERY 4 HOURS PRN
Qty: 18 G | Refills: 5 | Status: SHIPPED | OUTPATIENT
Start: 2024-01-17

## 2024-01-17 NOTE — ASSESSMENT & PLAN NOTE
Medical examination consistent with an exacerbation of COPD.  Will provide a z pack and 5 days of prednisone

## 2024-01-18 NOTE — ASSESSMENT & PLAN NOTE
Minimal elevation of triglycerides at last check.  Cholesterol is at goal levels on atorvastatin 20 mg

## 2024-01-18 NOTE — PROGRESS NOTES
Name: Chris R Billig      : 1965      MRN: [id number]  Encounter Provider: Boby Varghese MD  Encounter Date: 2024   Encounter department: [facility] [place]    Assessment & Plan     1. Tobacco abuse  Assessment & Plan:  No current desire to stop smoking      2. Medical marijuana use  Assessment & Plan:  Followed by an outside provider.      3. Bipolar disorder, mixed (HCC)  Assessment & Plan:  Stable on Seroquel 300 mg at bedtime    Orders:  -     QUEtiapine (SEROquel) 300 mg tablet; Take 1 tablet (300 mg total) by mouth daily at bedtime    4. Acute exacerbation of chronic obstructive pulmonary disease (COPD) (HCC)  Assessment & Plan:  Medical examination consistent with an exacerbation of COPD.  Will provide a z pack and 5 days of prednisone      Orders:  -     azithromycin (Zithromax) 250 mg tablet; Take 2 tablets (500 mg total) by mouth daily for 1 day, THEN 1 tablet (250 mg total) daily for 4 days.  -     predniSONE 20 mg tablet; Take 2 tablets (40 mg total) by mouth daily for 5 days    5. Prepatellar bursitis of right knee  -     albuterol (Ventolin HFA) 90 mcg/act inhaler; Inhale 1-2 puffs every 4 (four) hours as needed for wheezing or shortness of breath    6. Dyslipidemia  Assessment & Plan:  Minimal elevation of triglycerides at last check.  Cholesterol is at goal levels on atorvastatin 20 mg    Orders:  -     atorvastatin (LIPITOR) 20 mg tablet; Take 1 tablet (20 mg total) by mouth every evening    7. Vitamin D deficiency  -     Cholecalciferol 50 MCG (2000 UT) CAPS; Take 2 capsules (4,000 Units total) by mouth 2 (two) times a day    8. Uncomplicated asthma, unspecified asthma severity, unspecified whether persistent  Assessment & Plan:  History of asthma utilizes albuterol inhaler as needed.    Orders:  -     hydrOXYzine HCL (ATARAX) 50 mg tablet; Take 1 tablet (50 mg total) by mouth every 8 (eight) hours as needed for allergies    9. Gastroesophageal  reflux disease without esophagitis  -     omeprazole (PriLOSEC) 40 MG capsule; Take 1 capsule (40 mg total) by mouth daily    10. GERD without esophagitis  Assessment & Plan:  Stable with continued use of omeprazole    Orders:  -     omeprazole (PriLOSEC) 40 MG capsule; Take 1 capsule (40 mg total) by mouth daily           Subjective      Patient presents to the office to reestablish medical care.  Underwent cervical spine decompression and fusion C3-C6 for cervical myelopathy.  He apparently has had significant improvement almost immediately after the surgery.  He continues to improve.  Currently is ambulating well with the use of a walker for support.  His medical diagnoses and medications were reviewed.  Inactive processes were resolved from his record.  Medications were updated.  He had been following with [facility] but was very unhappy with the treatment he received there.  He had been a patient of \[facility]\"" previously but has not been seen for over 3 years and would now like to reestablish care.  He is an active smoker and has no desire to stop smoking.  He has some dyslipidemia, reflux disease which is not problematic as long as he continues his PPI and probable COPD's for his long period of smoking.  He experiences occasional wheezing and periodic cough.  His previous lab studies were while he was hospitalized for his surgery.  Lipid profile in July 2023 was mildly abnormal with a very mild hypertriglyceridemia of 162 otherwise his lipid profile was at goal      Review of Systems   Constitutional: Negative.    HENT: Negative.     Eyes: Negative.    Respiratory:  Positive for cough, shortness of breath and wheezing. Negative for chest tightness.    Cardiovascular:  Negative for chest pain, palpitations and leg swelling.   Gastrointestinal: Negative.    Endocrine: Negative.    Genitourinary: Negative.    Musculoskeletal: Negative.    Skin: Negative.    Neurological:  Positive for weakness.  "  Psychiatric/Behavioral: Negative.         Current Outpatient Medications on File Prior to Visit   Medication Sig    Ferrous Fumarate 325 (106 Fe) MG TABS Take 1 tablet by mouth daily with breakfast    [DISCONTINUED] albuterol (Ventolin HFA) 90 mcg/act inhaler Inhale 1-2 puffs every 4 (four) hours as needed for wheezing or shortness of breath    [DISCONTINUED] atorvastatin (LIPITOR) 20 mg tablet Take 1 tablet (20 mg total) by mouth every evening    [DISCONTINUED] Cholecalciferol 50 MCG (2000 UT) CAPS Take 1 capsule by mouth 2 (two) times a day    [DISCONTINUED] hydrOXYzine HCL (ATARAX) 50 mg tablet Take 50 mg by mouth every 8 (eight) hours as needed    [DISCONTINUED] omeprazole (PriLOSEC) 40 MG capsule Take 1 capsule (40 mg total) by mouth daily    [DISCONTINUED] QUEtiapine (SEROquel) 300 mg tablet Take 300 mg by mouth daily at bedtime Take 1/2 tablet twice daily    [DISCONTINUED] atorvastatin (LIPITOR) 10 mg tablet Take 2 tablets (20 mg total) by mouth daily (Patient not taking: Reported on 1/17/2024)    [DISCONTINUED] Incontinence Supply Disposable (Moist Wipes Flushable) MISC Use 6 (six) times a day (Patient not taking: Reported on 1/17/2024)    [DISCONTINUED] QUEtiapine (SEROquel) 200 mg tablet TAKE 1.5 TABLETS (300 MG TOTAL) BY MOUTH NIGHTLY. (Patient not taking: Reported on 1/17/2024)    [DISCONTINUED] zinc oxide 20 % ointment Apply topically as needed for irritation or dry skin (Patient not taking: Reported on 10/2/2023)       Objective     /70 (BP Location: Left arm, Patient Position: Sitting, Cuff Size: Adult)   Pulse 76   Temp (!) 97.1 °F (36.2 °C) (Tympanic)   Ht 5' 3.39\" (1.61 m)   Wt 60.6 kg (133 lb 9.6 oz)   SpO2 97% Comment: ra  BMI 23.38 kg/m²     Physical Exam  Vitals reviewed.   Constitutional:       General: Adolph is not in acute distress.     Appearance: Normal appearance. Adolph is normal weight. Adolph is not ill-appearing, toxic-appearing or diaphoretic.   HENT:      Head: " Normocephalic and atraumatic.      Right Ear: External ear normal.      Left Ear: External ear normal.      Nose: Nose normal.      Mouth/Throat:      Mouth: Mucous membranes are moist.   Eyes:      General: No scleral icterus.     Conjunctiva/sclera: Conjunctivae normal.      Pupils: Pupils are equal, round, and reactive to light.   Cardiovascular:      Rate and Rhythm: Normal rate and regular rhythm.      Heart sounds: Normal heart sounds. No murmur heard.  Pulmonary:      Effort: Pulmonary effort is normal. No respiratory distress.      Breath sounds: Wheezing (Lateral expiratory wheezes throughout both lung fields) and rhonchi (Coarse rhonchi throughout both lung fields) present. No rales.   Abdominal:      General: Abdomen is flat. There is no distension.      Tenderness: There is no abdominal tenderness.   Musculoskeletal:      Cervical back: Neck supple.   Lymphadenopathy:      Cervical: No cervical adenopathy.   Neurological:      Mental Status: Adolph is oriented to person, place, and time. Mental status is at baseline.      Cranial Nerves: No cranial nerve deficit.      Coordination: Coordination abnormal (Utilizes a cane for balance support).      Gait: Gait normal.   Psychiatric:         Mood and Affect: Mood normal.         Behavior: Behavior normal.         Thought Content: Thought content normal.       Boby Varghese MD

## 2024-02-21 PROBLEM — Z12.11 SCREENING FOR COLON CANCER: Status: RESOLVED | Noted: 2018-02-28 | Resolved: 2024-02-21

## 2024-02-26 ENCOUNTER — OFFICE VISIT (OUTPATIENT)
Age: 59
End: 2024-02-26
Payer: COMMERCIAL

## 2024-02-26 VITALS
HEIGHT: 64 IN | HEART RATE: 71 BPM | SYSTOLIC BLOOD PRESSURE: 110 MMHG | BODY MASS INDEX: 22.57 KG/M2 | TEMPERATURE: 98.9 F | OXYGEN SATURATION: 94 % | WEIGHT: 132.2 LBS | DIASTOLIC BLOOD PRESSURE: 70 MMHG

## 2024-02-26 DIAGNOSIS — Z98.1 S/P CERVICAL SPINAL FUSION: Primary | ICD-10-CM

## 2024-02-26 DIAGNOSIS — R68.89 SENSITIVITY TO THE COLD: ICD-10-CM

## 2024-02-26 PROCEDURE — 99213 OFFICE O/P EST LOW 20 MIN: CPT | Performed by: INTERNAL MEDICINE

## 2024-02-26 RX ORDER — MOMETASONE FUROATE 1 MG/ML
SOLUTION TOPICAL DAILY
Qty: 60 ML | Refills: 1 | Status: SHIPPED | OUTPATIENT
Start: 2024-02-26

## 2024-02-26 NOTE — PROGRESS NOTES
Name: Chris R Billig      : 1965      MRN: [id number]  Encounter Provider: Boby Varghese MD  Encounter Date: 2024   Encounter department: [facility]    Assessment & Plan     1. S/P cervical spinal fusion  -     Ambulatory Referral to Neurology; Future  -     Comprehensive metabolic panel  -     CBC and differential  -     C-reactive protein  -     Magnesium; Future  -     Vitamin B12; Future  -     Folate; Future  -     T3; Future  -     TSH, 3rd generation with Free T4 reflex; Future    2. Sensitivity to the cold  -     Ambulatory Referral to Neurology; Future  -     Comprehensive metabolic panel  -     CBC and differential  -     C-reactive protein  -     Magnesium; Future  -     Vitamin B12; Future  -     Folate; Future  -     T3; Future  -     TSH, 3rd generation with Free T4 reflex; Future  -     mometasone (ELOCON) 0.1 % lotion; Apply topically daily           Subjective      The patient presents to the office with complaints of altered sensation to temperature in his upper extremities that has been present since his spinal fusion procedure in the 2023.  He complains of a sensitivity to cold where experiences a severe burning sensation whenever he is exposed to any cold temperatures.  He awakens in the night with severe pruritus in his arms and a paradoxical sensation of pain to cool temperature.  Presently he is not experiencing this symptom.  His upper extremities are warm to touch with good pulses and good color.  There is no erythema or edema.  He denies any swelling his only complaint is that of pain on exposure to cold and it only involves his upper extremities.  Denies any symptoms involving his lower extremities.    Arm Pain   Associated symptoms include numbness (Intermittent involving his upper extremities).     Review of Systems   Constitutional:  Positive for activity change (Ambulation ability since his cervical fusion). Negative for  "chills and fever.   HENT: Negative.     Eyes: Negative.    Respiratory: Negative.     Cardiovascular: Negative.    Gastrointestinal: Negative.    Endocrine: Positive for cold intolerance (Patient experiences pain in his upper extremities when exposed to cold). Negative for heat intolerance.   Genitourinary: Negative.    Musculoskeletal:  Negative for myalgias (Of the upper extremities, only when exposed to cold) and neck pain.   Skin:         Pruritus of the proximal upper extremities   Allergic/Immunologic: Negative.    Neurological:  Positive for weakness (Improving of the upper extremities) and numbness (Intermittent involving his upper extremities).   Hematological: Negative.    Psychiatric/Behavioral: Negative.         Current Outpatient Medications on File Prior to Visit   Medication Sig    albuterol (Ventolin HFA) 90 mcg/act inhaler Inhale 1-2 puffs every 4 (four) hours as needed for wheezing or shortness of breath    atorvastatin (LIPITOR) 20 mg tablet Take 1 tablet (20 mg total) by mouth every evening    Cholecalciferol 50 MCG (2000 UT) CAPS Take 2 capsules (4,000 Units total) by mouth 2 (two) times a day    Ferrous Fumarate 325 (106 Fe) MG TABS Take 1 tablet by mouth daily with breakfast    hydrOXYzine HCL (ATARAX) 50 mg tablet Take 1 tablet (50 mg total) by mouth every 8 (eight) hours as needed for allergies    omeprazole (PriLOSEC) 40 MG capsule Take 1 capsule (40 mg total) by mouth daily    QUEtiapine (SEROquel) 300 mg tablet Take 1 tablet (300 mg total) by mouth daily at bedtime       Objective     /70 (BP Location: Left arm, Patient Position: Sitting, Cuff Size: Standard)   Pulse 71   Temp 98.9 °F (37.2 °C) (Temporal)   Ht 5' 3.86\" (1.622 m)   Wt 60 kg (132 lb 3.2 oz)   SpO2 94%   BMI 22.79 kg/m²     Physical Exam  Vitals reviewed.   Constitutional:       General: Adolph is not in acute distress.     Appearance: Normal appearance. Adolph is normal weight. Adolph is not ill-appearing, " toxic-appearing or diaphoretic.   HENT:      Head: Normocephalic and atraumatic.      Right Ear: External ear normal.      Left Ear: External ear normal.      Nose: Nose normal.      Mouth/Throat:      Mouth: Mucous membranes are moist.   Eyes:      General: No scleral icterus.     Conjunctiva/sclera: Conjunctivae normal.      Pupils: Pupils are equal, round, and reactive to light.   Cardiovascular:      Rate and Rhythm: Normal rate and regular rhythm.      Pulses: Normal pulses.      Heart sounds: Normal heart sounds. No murmur heard.  Pulmonary:      Effort: Pulmonary effort is normal. No respiratory distress.      Breath sounds: Normal breath sounds.   Abdominal:      General: Abdomen is flat. There is no distension.   Musculoskeletal:         General: No swelling, tenderness or deformity.      Cervical back: Neck supple.      Right lower leg: No edema.      Left lower leg: No edema.   Skin:     General: Skin is warm.      Capillary Refill: Capillary refill takes less than 2 seconds.      Coloration: Skin is not jaundiced or pale.      Findings: No bruising, erythema or rash.   Neurological:      General: No focal deficit present.      Mental Status: Adolph is alert and oriented to person, place, and time. Mental status is at baseline.      Sensory: No sensory deficit.      Gait: Gait abnormal (Ambulates with the use of a cane.).      Deep Tendon Reflexes: Reflexes normal.   Psychiatric:         Mood and Affect: Mood normal.         Behavior: Behavior normal.       Boby Varghese MD

## 2024-04-01 NOTE — TELEPHONE ENCOUNTER
Last appt, 6/8/18    Next appt, 8/24/18     Can someone please look into the rescue inhaler or should he wait until his appt? Fair

## 2024-05-24 ENCOUNTER — TELEPHONE (OUTPATIENT)
Age: 59
End: 2024-05-24

## 2024-05-24 NOTE — TELEPHONE ENCOUNTER
Patient called asking for the date of his last surgery or procedure with Dr Jus Anderson, I provided patient with the phone number and address to the Dr, he will be calling that

## 2024-05-30 ENCOUNTER — OFFICE VISIT (OUTPATIENT)
Dept: NEUROSURGERY | Facility: CLINIC | Age: 59
End: 2024-05-30
Payer: COMMERCIAL

## 2024-05-30 VITALS
BODY MASS INDEX: 23.39 KG/M2 | WEIGHT: 132 LBS | OXYGEN SATURATION: 96 % | SYSTOLIC BLOOD PRESSURE: 132 MMHG | RESPIRATION RATE: 17 BRPM | TEMPERATURE: 98.3 F | HEIGHT: 63 IN | HEART RATE: 75 BPM | DIASTOLIC BLOOD PRESSURE: 78 MMHG

## 2024-05-30 DIAGNOSIS — M47.12 OTHER SPONDYLOSIS WITH MYELOPATHY, CERVICAL REGION: Primary | ICD-10-CM

## 2024-05-30 PROCEDURE — 99212 OFFICE O/P EST SF 10 MIN: CPT | Performed by: STUDENT IN AN ORGANIZED HEALTH CARE EDUCATION/TRAINING PROGRAM

## 2024-05-30 NOTE — PROGRESS NOTES
Ambulatory Visit  Name: Chris R Billig      : 1965      MRN: [id number]  Encounter Provider: Jus Anderson MD  Encounter Date: 2024   Encounter department: [facility]    Assessment & Plan   Chris R Billig is a 58 y.o. adult with a prior history of C5-6 ACDF at outside hospital who presented with cervical myelopathy and is status post C3-6 decompression and fusion on 10/4/2023.  He is approximately 8 months postop.  He is doing well postoperatively.  Incisions clean dry and intact.  He has not had any repeat x-rays since December.  I ordered a x-ray cervical to ensure stable hardware.  The numbness in his first and third digits sounds like possible carpal tunnel syndrome however the patient does not wish to undergo another EMG.  I recommended over-the-counter wrist splints for his wrist for possible carpal tunnel syndrome to try out.  Return to clinic in 6 to 8 weeks with x-ray cervical spine.    I spent 15 minutes obtaining history and physical exam, reviewing imaging, discussing treatment options, and coordinating care.        History of Present Illness     Chris R Billig is a 58 y.o. adult with a prior history of C5-6 ACDF at outside hospital who presented with cervical myelopathy and is status post C3-6 decompression and fusion on 10/4/2023.  He is approximately 8 months postop.  He is doing well postoperatively.  Numbness and his weakness has dramatically improved postoperatively.  He is ambulating independently.  His  strength has dramatically improved.  He states that the numbness has improved but he still has some numbness in the 1st-3rd digits in his bilateral upper extremities.  The numbness is only in the distal tips of the digits and does not involve the legs or his forearms or arms.  The numbness in his torso has improved as well.  His incision is clean dry and intact.  He states the numbness is worse at night when he rests his arms.    Review of  "Systems    Objective     /78 (BP Location: Left arm, Patient Position: Sitting)   Pulse 75   Temp 98.3 °F (36.8 °C) (Temporal)   Resp 17   Ht 5' 3\" (1.6 m)   Wt 59.9 kg (132 lb)   SpO2 96%   BMI 23.38 kg/m²     Physical Exam  Administrative Statements   A&OX3  Gaze conjugate  EOMI  FS  TM  Fcx4  5/5 BUE  5/5 BLE  Numbness in BUE 1st-3rd digits          "

## 2024-06-28 DIAGNOSIS — E78.5 DYSLIPIDEMIA: ICD-10-CM

## 2024-06-28 RX ORDER — ATORVASTATIN CALCIUM 20 MG/1
20 TABLET, FILM COATED ORAL EVERY EVENING
Qty: 30 TABLET | Refills: 5 | Status: SHIPPED | OUTPATIENT
Start: 2024-06-28 | End: 2024-12-25

## 2024-07-03 ENCOUNTER — TELEPHONE (OUTPATIENT)
Dept: NEUROLOGY | Facility: CLINIC | Age: 59
End: 2024-07-03

## 2024-07-08 DIAGNOSIS — M70.41 PREPATELLAR BURSITIS OF RIGHT KNEE: ICD-10-CM

## 2024-07-09 RX ORDER — ALBUTEROL SULFATE 90 UG/1
1-2 AEROSOL, METERED RESPIRATORY (INHALATION) EVERY 4 HOURS PRN
Qty: 18 G | Refills: 5 | Status: SHIPPED | OUTPATIENT
Start: 2024-07-09

## 2024-07-10 NOTE — PROGRESS NOTES
Neurology Ambulatory Visit  Name: Chris R Billig       : 1965       MRN: [id number]   Encounter Provider: Louisa Chirinos MD   Encounter Date: 2024  Encounter department: [facility] [place]    Assessment and Plan  1. Bilateral carpal tunnel syndrome  Assessment & Plan:  He has numbness of both hands, first 3 digits.  He is thinks this started since his spinal cord fusion.  On exam, he has decreased sensation in the median nerve distribution and Tinel sign is positive on the left side.  He is not weak.    Recommendations:  -Advised to obtain carpal tunnel brace for 1 hand and tried at night as well as intermittently through the day.  If it helpful, he can buy it for the other hand as well.  -He refused EMG  2. S/P cervical spinal fusion  -     Ambulatory Referral to Neurology  3. Sensitivity to the cold  -     Ambulatory Referral to Neurology  4. Left hand pain  Assessment & Plan:  He has left hand pain when he rides his bicycle.  He does not think he is gripping the bicycle too tightly.    Recommendations:  -Advised to try Voltaren gel available over-the-counter      Adolph will Return if symptoms worsen or fail to improve.          History of Present Illness     Mr Billig is a 59 yr old right handed gentleman with PMH tobacco use, right rotator cuff tear, hypertriglyceridemia, bipolar disorder, asthma, s/p cervical spinal fusion, presents for new neuromuscular consultation for numbness in the hands. He is here with Justin, his .    He has a history of cervical spinal myelopathy. His hands were curled and he could not move his hands. He required cervical spinal cord fusion in Oct 2023.     Since then he cannot feel the lateral 3 digits on each hand.  He repeatedly states that he did not have the symptoms prior to surgery.    He is doing better with walking.  He is now able to move his hands better.    He had 2 EMGs prior to surgery. He is refusing EMG as part of workup  "and does not want any injections.    No exacerbating factors. Late at night they may hurt. The symptoms do not wake him.  He is a side sleeper. He is careful to not lay on his arms because he is worried about losing sensation.    He feels like his skin at the area of his symptoms is shedding.     His whole body feels cold after eating at night or eating ice cream.  He attributes this to the metal plate in his neck.      PMH: asthma  PSH: cervical spinal fuson  Social: 1/2 ppd, no alcohol, drugs. +medical marijuana  Family: diabetes (grandfather)           Review of Systems   Constitutional:  Positive for chills. Negative for fever.   HENT:  Positive for sinus pain. Negative for sore throat.    Respiratory:  Negative for shortness of breath.    Cardiovascular:  Negative for chest pain.   Gastrointestinal:         GERD   Endocrine: Positive for cold intolerance.   Genitourinary:  Positive for difficulty urinating.   Musculoskeletal:  Negative for back pain.   Neurological:  Positive for numbness. Negative for light-headedness and headaches.         Objective     /60 (BP Location: Left arm, Patient Position: Sitting, Cuff Size: Standard)   Pulse 73   Temp 97.8 °F (36.6 °C) (Temporal)   Ht 5' 3\" (1.6 m)   Wt 54.4 kg (120 lb)   SpO2 93%   BMI 21.26 kg/m²        Physical Exam  Patient refused      Neurologic Exam  Mental status: Awake, alert, oriented to person and place.  He knew it was July but not the day of the month.  He was able to repeat.  He refused the rest of mental status testing.  \"This is not a game show.\"    Cranial nerves: He refused the exam.  On observation, he has normal eye movements.  His face is symmetric.  Speech is clear.    Motor:   Deltoid: Right 5/5, left 5/5  Biceps: Right 5/5, left 5/5  Triceps: Right 5/5, left 5/5  : Right 5/5, left 5/5  Intrinsic hand muscles: Right 5/5, left 5/5  Abductor pollicis brevis: Right 5/5, left 5/5    He refused lower extremity exam.    Tinel sign " present on the left.    Cerebellar: Refused.  Observed to be limping on walking.  No assistive devices.    Reflexes:   Biceps: Right 0, left 0  Triceps: Right 3+, left 3+  Brachioradialis: Right 0, left 0  Patellar: Right 3+, left 3+  Achilles: Right 2+, left 2+  He did not take off his shoes    Sensory: He only allowed me to examine his hands.  Temperature is reduced in the lateral 3 fingertips and lateral palm bilaterally.  Temperature sensation also splits the ring finger bilaterally with less sensation in the median nerve distribution.        DATA:  Xray c spine 12/11/23:  Postsurgical change status post laminectomy and posterior fusion C3-C6 and anterior fusion C5-C6 with intact hardware.     MRI cervical spine 8/7/23:  IMPRESSION:  1. Since the prior MRI 5/10/2022, the degree of central canal stenosis at C4-5  has slightly decreased and is now mild to moderate. This is due to the increased  annular bulging and the new ligamentum flavum hypertrophy.  2. The abnormal T2 intramedullary T2 hyperintensity has progressed and now  extends from the middle of C3 to the middle of C6. This could be secondary to  increased compressive myelopathy or cord edema superimposed on chronic  compressive myelopathy.  3. Status post anterior fusion at C3 and C6. Mild central canal stenosis at  C5-6. No change.  4. Multilevel neural foraminal stenosis as detailed above.        Administrative Statements   I have spent a total time of 40 minutes in caring for this patient on the day of the visit/encounter including Diagnostic results, Prognosis, Risks and benefits of tx options, Instructions for management, Patient and family education, Importance of tx compliance, Risk factor reductions, Impressions, Counseling / Coordination of care, Documenting in the medical record, Reviewing / ordering tests, medicine, procedures  , and Obtaining or reviewing history  .

## 2024-07-11 ENCOUNTER — CONSULT (OUTPATIENT)
Dept: NEUROLOGY | Facility: CLINIC | Age: 59
End: 2024-07-11
Payer: COMMERCIAL

## 2024-07-11 VITALS
TEMPERATURE: 97.8 F | DIASTOLIC BLOOD PRESSURE: 60 MMHG | HEART RATE: 73 BPM | HEIGHT: 63 IN | BODY MASS INDEX: 21.26 KG/M2 | WEIGHT: 120 LBS | SYSTOLIC BLOOD PRESSURE: 102 MMHG | OXYGEN SATURATION: 93 %

## 2024-07-11 DIAGNOSIS — M79.642 LEFT HAND PAIN: ICD-10-CM

## 2024-07-11 DIAGNOSIS — G56.03 BILATERAL CARPAL TUNNEL SYNDROME: Primary | ICD-10-CM

## 2024-07-11 DIAGNOSIS — R68.89 SENSITIVITY TO THE COLD: ICD-10-CM

## 2024-07-11 DIAGNOSIS — Z98.1 S/P CERVICAL SPINAL FUSION: ICD-10-CM

## 2024-07-11 PROCEDURE — 99244 OFF/OP CNSLTJ NEW/EST MOD 40: CPT | Performed by: PSYCHIATRY & NEUROLOGY

## 2024-07-11 NOTE — ASSESSMENT & PLAN NOTE
He has numbness of both hands, first 3 digits.  He is thinks this started since his spinal cord fusion.  On exam, he has decreased sensation in the median nerve distribution and Tinel sign is positive on the left side.  He is not weak.    Recommendations:  -Advised to obtain carpal tunnel brace for 1 hand and tried at night as well as intermittently through the day.  If it helpful, he can buy it for the other hand as well.  -He refused EMG

## 2024-07-11 NOTE — PATIENT INSTRUCTIONS
Please obtain a carpal tunnel brace for one hand. Try it at night and intermittently during the day. If it helps, you can buy one for the other side. Brace is available at [facility] or [facility].  Try Voltaren gel twice a day as needed for the hand pain that you get when you ride your bicycle. You can buy Voltaren in the supermarket or drug store.

## 2024-07-11 NOTE — ASSESSMENT & PLAN NOTE
He has left hand pain when he rides his bicycle.  He does not think he is gripping the bicycle too tightly.    Recommendations:  -Advised to try Voltaren gel available over-the-counter

## 2024-07-22 ENCOUNTER — OFFICE VISIT (OUTPATIENT)
Dept: NEUROSURGERY | Facility: CLINIC | Age: 59
End: 2024-07-22

## 2024-07-22 ENCOUNTER — HOSPITAL ENCOUNTER (OUTPATIENT)
Dept: RADIOLOGY | Facility: HOSPITAL | Age: 59
Discharge: HOME | End: 2024-07-22
Attending: STUDENT IN AN ORGANIZED HEALTH CARE EDUCATION/TRAINING PROGRAM
Payer: COMMERCIAL

## 2024-07-22 VITALS
DIASTOLIC BLOOD PRESSURE: 62 MMHG | BODY MASS INDEX: 21.26 KG/M2 | WEIGHT: 120 LBS | HEART RATE: 64 BPM | SYSTOLIC BLOOD PRESSURE: 118 MMHG | TEMPERATURE: 97.8 F | HEIGHT: 63 IN | OXYGEN SATURATION: 95 %

## 2024-07-22 DIAGNOSIS — M47.12 OTHER SPONDYLOSIS WITH MYELOPATHY, CERVICAL REGION: ICD-10-CM

## 2024-07-22 DIAGNOSIS — M47.12 OTHER SPONDYLOSIS WITH MYELOPATHY, CERVICAL REGION: Primary | ICD-10-CM

## 2024-07-22 PROCEDURE — 72040 X-RAY EXAM NECK SPINE 2-3 VW: CPT

## 2024-07-30 ENCOUNTER — OFFICE VISIT (OUTPATIENT)
Age: 59
End: 2024-07-30
Payer: COMMERCIAL

## 2024-07-30 VITALS
DIASTOLIC BLOOD PRESSURE: 68 MMHG | SYSTOLIC BLOOD PRESSURE: 116 MMHG | TEMPERATURE: 98.1 F | HEIGHT: 63 IN | OXYGEN SATURATION: 94 % | WEIGHT: 119.4 LBS | BODY MASS INDEX: 21.16 KG/M2 | HEART RATE: 61 BPM

## 2024-07-30 DIAGNOSIS — K21.9 GERD WITHOUT ESOPHAGITIS: ICD-10-CM

## 2024-07-30 DIAGNOSIS — J45.909 UNCOMPLICATED ASTHMA, UNSPECIFIED ASTHMA SEVERITY, UNSPECIFIED WHETHER PERSISTENT: ICD-10-CM

## 2024-07-30 DIAGNOSIS — E78.5 DYSLIPIDEMIA: ICD-10-CM

## 2024-07-30 DIAGNOSIS — K21.9 GASTROESOPHAGEAL REFLUX DISEASE WITHOUT ESOPHAGITIS: ICD-10-CM

## 2024-07-30 DIAGNOSIS — Z12.11 ENCOUNTER FOR SCREENING FOR MALIGNANT NEOPLASM OF COLON: Primary | ICD-10-CM

## 2024-07-30 DIAGNOSIS — Z12.12 SCREENING FOR COLORECTAL CANCER: ICD-10-CM

## 2024-07-30 DIAGNOSIS — R63.0 LOSS OF APPETITE: ICD-10-CM

## 2024-07-30 DIAGNOSIS — Z12.11 SCREENING FOR COLORECTAL CANCER: ICD-10-CM

## 2024-07-30 DIAGNOSIS — F31.60 BIPOLAR DISORDER, MIXED (HCC): ICD-10-CM

## 2024-07-30 PROCEDURE — 99214 OFFICE O/P EST MOD 30 MIN: CPT | Performed by: INTERNAL MEDICINE

## 2024-07-30 RX ORDER — FERROUS SULFATE 325(65) MG
1 TABLET, DELAYED RELEASE (ENTERIC COATED) ORAL
COMMUNITY
Start: 2024-07-03 | End: 2024-07-30 | Stop reason: SDUPTHER

## 2024-07-30 RX ORDER — OMEPRAZOLE 40 MG/1
40 CAPSULE, DELAYED RELEASE ORAL DAILY
Qty: 5 CAPSULE | Refills: 5 | Status: SHIPPED | OUTPATIENT
Start: 2024-07-30 | End: 2024-07-30 | Stop reason: SDUPTHER

## 2024-07-30 RX ORDER — QUETIAPINE FUMARATE 300 MG/1
300 TABLET, FILM COATED ORAL
Qty: 30 TABLET | Refills: 5 | Status: SHIPPED | OUTPATIENT
Start: 2024-07-30

## 2024-07-30 RX ORDER — ATORVASTATIN CALCIUM 20 MG/1
20 TABLET, FILM COATED ORAL EVERY EVENING
Qty: 30 TABLET | Refills: 5 | Status: CANCELLED | OUTPATIENT
Start: 2024-07-30 | End: 2025-01-26

## 2024-07-30 RX ORDER — HYDROXYZINE 50 MG/1
50 TABLET, FILM COATED ORAL EVERY 8 HOURS PRN
Qty: 90 TABLET | Refills: 1 | Status: SHIPPED | OUTPATIENT
Start: 2024-07-30

## 2024-07-30 RX ORDER — MIRTAZAPINE 15 MG/1
15 TABLET, FILM COATED ORAL
Qty: 30 TABLET | Refills: 5 | Status: SHIPPED | OUTPATIENT
Start: 2024-07-30

## 2024-07-30 RX ORDER — OMEPRAZOLE 40 MG/1
40 CAPSULE, DELAYED RELEASE ORAL DAILY
Qty: 30 CAPSULE | Refills: 5 | Status: SHIPPED | OUTPATIENT
Start: 2024-07-30

## 2024-07-30 NOTE — PROGRESS NOTES
Ambulatory Visit  Name: Chris R Billig      : 1965      MRN: [id number]  Encounter Provider: Boby Varghese MD  Encounter Date: 2024   Encounter department: [facility] CARE [place]    Assessment & Plan   1. Encounter for screening for malignant neoplasm of colon  -     Cologuard  -     Ambulatory Referral to Gastroenterology; Future  2. Bipolar disorder, mixed (HCC)  Assessment & Plan:  Seroquel was renewed.  Overall his mood appears to be stable  Orders:  -     QUEtiapine (SEROquel) 300 mg tablet; Take 1 tablet (300 mg total) by mouth daily at bedtime  -     mirtazapine (REMERON) 15 mg tablet; Take 1 tablet (15 mg total) by mouth daily at bedtime  3. Gastroesophageal reflux disease without esophagitis  -     omeprazole (PriLOSEC) 40 MG capsule; Take 1 capsule (40 mg total) by mouth daily  4. GERD without esophagitis  Assessment & Plan:  Omeprazole was renewed.  Orders:  -     omeprazole (PriLOSEC) 40 MG capsule; Take 1 capsule (40 mg total) by mouth daily  5. Uncomplicated asthma, unspecified asthma severity, unspecified whether persistent  Assessment & Plan:  COPD/asthma.  He does not tolerate the hot humid weather very well.  No significant wheezing noted on examination.  Orders:  -     hydrOXYzine HCL (ATARAX) 50 mg tablet; Take 1 tablet (50 mg total) by mouth every 8 (eight) hours as needed for anxiety  6. Dyslipidemia  Assessment & Plan:  Takes atorvastatin intermittently.  7. Loss of appetite  Assessment & Plan:  He does have medical marijuana that he uses but it is not helping.  We will prescribe a small dose of Remeron to see if it helps to stimulate his appetite.  Orders:  -     mirtazapine (REMERON) 15 mg tablet; Take 1 tablet (15 mg total) by mouth daily at bedtime  8. Screening for colorectal cancer  Assessment & Plan:  Patient was given a fit test  Orders:  -     Occult Blood, Fecal Immunochemical (FIT); Future       History of Present Illness     Patient  "presents to the office for follow-up visit.  Overall he is not doing badly he still complains of some numbness tingling in his upper extremities and hands.  He was seen by neurology recently.  They feel may be more carpal tunnel than residual from his neck surgery.  The patient also understands that this may be permanent and residual from his neck surgery.  He was encouraged to wear the wrist splints for at least 4 to 6 weeks.  He complains of a loss of appetite and some modest weight loss as a result.  He is looking for some medication to help stimulate his appetite.  He does use medical marijuana but this does not help his appetite.        Review of Systems   Constitutional:  Positive for appetite change (Decreased) and unexpected weight change (13 pound weight loss since February). Negative for chills, diaphoresis, fatigue and fever.   HENT: Negative.     Eyes: Negative.    Respiratory: Negative.     Cardiovascular: Negative.    Gastrointestinal: Negative.    Endocrine: Negative.    Genitourinary: Negative.    Musculoskeletal: Negative.    Allergic/Immunologic: Negative.    Neurological:  Positive for weakness and numbness.   Hematological: Negative.    Psychiatric/Behavioral:  Positive for dysphoric mood.        Objective     /68 (BP Location: Left arm, Patient Position: Sitting, Cuff Size: Standard)   Pulse 61   Temp 98.1 °F (36.7 °C) (Temporal)   Ht 5' 3\" (1.6 m)   Wt 54.2 kg (119 lb 6.4 oz)   SpO2 94%   BMI 21.15 kg/m²     Physical Exam  Vitals reviewed.   Constitutional:       General: Adolph is not in acute distress.     Appearance: Normal appearance. Adolph is not ill-appearing, toxic-appearing or diaphoretic.   HENT:      Head: Normocephalic and atraumatic.      Right Ear: External ear normal.      Left Ear: External ear normal.      Nose: Nose normal.      Mouth/Throat:      Mouth: Mucous membranes are moist.      Pharynx: Oropharynx is clear.   Eyes:      General: No scleral icterus.     " Conjunctiva/sclera: Conjunctivae normal.      Pupils: Pupils are equal, round, and reactive to light.   Neck:      Vascular: No carotid bruit.   Cardiovascular:      Rate and Rhythm: Normal rate and regular rhythm.      Heart sounds: Normal heart sounds. No murmur heard.  Pulmonary:      Effort: Pulmonary effort is normal. No respiratory distress.      Breath sounds: Normal breath sounds.   Abdominal:      General: Abdomen is flat. Bowel sounds are normal. There is no distension.   Musculoskeletal:      Cervical back: Neck supple.      Right lower leg: No edema.      Left lower leg: No edema.   Lymphadenopathy:      Cervical: No cervical adenopathy.   Skin:     General: Skin is warm and dry.      Capillary Refill: Capillary refill takes less than 2 seconds.      Coloration: Skin is not jaundiced.      Findings: No bruising, erythema or rash.   Neurological:      Mental Status: Adolph is alert and oriented to person, place, and time. Mental status is at baseline.      Sensory: No sensory deficit.   Psychiatric:         Mood and Affect: Mood normal.         Behavior: Behavior normal.         Thought Content: Thought content normal.       Administrative Statements

## 2024-07-30 NOTE — ASSESSMENT & PLAN NOTE
He does have medical marijuana that he uses but it is not helping.  We will prescribe a small dose of Remeron to see if it helps to stimulate his appetite.

## 2024-07-30 NOTE — TELEPHONE ENCOUNTER
[facility] calling regarding Rx: Omeprazole. States prescription order to dispense quantity is written 5 capsules. Inquiring if is to dispense 30 capsules.    Call back # [phone]    Please advise.  Thank you

## 2024-07-30 NOTE — ASSESSMENT & PLAN NOTE
COPD/asthma.  He does not tolerate the hot humid weather very well.  No significant wheezing noted on examination.

## 2024-07-31 ENCOUNTER — TELEPHONE (OUTPATIENT)
Age: 59
End: 2024-07-31

## 2024-07-31 NOTE — TELEPHONE ENCOUNTER
Jennifer from [facility] called asking if patient needs appt. For MA57 form.  Contacted clerical and patient needs appt.  Patient will call back to schedule.

## 2024-08-22 ENCOUNTER — TELEPHONE (OUTPATIENT)
Age: 59
End: 2024-08-22

## 2024-08-22 NOTE — TELEPHONE ENCOUNTER
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 He is going to have it faxed to the office. It needs to be completed by Dr GARCIA. Patient is aware that the only day in the next coming week that his is not here is Wednesday.    Please keep an eye out for the fax.

## 2024-08-29 PROBLEM — Z12.11 SCREENING FOR COLORECTAL CANCER: Status: RESOLVED | Noted: 2024-07-30 | Resolved: 2024-08-29

## 2024-08-29 PROBLEM — Z12.12 SCREENING FOR COLORECTAL CANCER: Status: RESOLVED | Noted: 2024-07-30 | Resolved: 2024-08-29

## 2024-10-31 ENCOUNTER — TELEPHONE (OUTPATIENT)
Age: 59
End: 2024-10-31

## 2024-10-31 NOTE — TELEPHONE ENCOUNTER
Pt called to cx appt for today, 10/31 as his mentor Justin was unaware of this appt and is unable to bring him.    Pt asked if the office can call Justin to schedule his appt as he provides the trasnportation for pt.    Please call:    Justin [place] ()  [phone]

## 2024-11-12 ENCOUNTER — TELEPHONE (OUTPATIENT)
Age: 59
End: 2024-11-12

## 2024-11-12 NOTE — TELEPHONE ENCOUNTER
Patient had called in and was wanting to have the  give him a call back to discuss somethings.     Please advise.

## 2024-11-12 NOTE — TELEPHONE ENCOUNTER
Pt requesting a call back from the  regarding an issue he's experiencing with the company that delivered his electric wheelchair. The battery  and pt was advised by a rep that following the manufacturers instructions will kill the battery. He stated they'd be calling the office for more information but wanted to discuss it further with the manager as they are trying to get out of paying for the battery.    Please contact to advise.

## 2024-11-13 NOTE — TELEPHONE ENCOUNTER
Please advise.. pt called and spoke to access and was not happy with them, called into office to reach a manager. While on hold with access center he hung up.   Please give patient a call if possible

## 2024-11-13 NOTE — TELEPHONE ENCOUNTER
Spoke to patient regarding his wheelchair battery is no longer working and [employer] will be faxing an order for Dr Varghese to sign.      Notified patient we received the form for his wheelchair battery, Dr Varghese signed the form and I faxed it back to [employer] [facility].    [employer]   Phone: [phone]  Fax:  [phone]    No other problems at this time.

## 2024-11-13 NOTE — TELEPHONE ENCOUNTER
Pt called regarding the call back he was waiting for by the  to discuss his wheelchair; pt made it known to me that he was going to wait on hold until she became available and would be fine with that. I reached out to the office and the office member was going to try and connect  with the pt. As I was waiting for the transfer, pt disconnected from the line.    Please contact pt back to advise if possible, TY.

## 2024-11-14 NOTE — PLAN OF CARE
Problem: PHYSICAL THERAPY ADULT  Goal: Performs mobility at highest level of function for planned discharge setting. See evaluation for individualized goals. Description: Treatment/Interventions: ADL retraining, Functional transfer training, LE strengthening/ROM, Endurance training, Therapeutic exercise, Cognitive reorientation, Patient/family training, Equipment eval/education, Gait training, Bed mobility, Spoke to nursing, Spoke to case management, OT  Equipment Recommended: Marsha Lopez       See flowsheet documentation for full assessment, interventions and recommendations. Outcome: Progressing  Note: Prognosis: Good  Problem List: Decreased strength, Decreased endurance, Impaired balance, Decreased mobility, Decreased coordination, Decreased cognition, Impaired judgement, Decreased safety awareness, Pain  Assessment: Pt is a 66 y.o. choose not to disclose seen for a high complexity PT evaluation due to Ongoing medical management for primary dx, Increased reliance on more restrictive AD compared to baseline, Decreased activity tolerance compared to baseline, Fall risk, Increased assistance needed from caregiver at current time, Cog status, PACO drain in place at current time, s/p surgical intervention. Patient is s/p admit to [facility] on 10/2/2023 for Cervical radiculopathy (M54.12)  Failure to thrive in adult (R62.7)  Fall, initial encounter (M05.IXQY). Patient  has a past medical history of Anemia, Anxiety, Arthritis, Asthma, Dermatitis of foot, Edentulous, Fracture of fibula, GERD (gastroesophageal reflux disease), GERD (gastroesophageal reflux disease), History of fusion of cervical spine, Hyperlipidemia, Low back pain, Marijuana smoker, episodic, Moderate persistent asthma, Neurosis, anxiety, panic type, Prepatellar bursitis, right knee, Smoker, Tinnitus, Tobacco abuse, and Wears glasses. .     PT now consulted to assess functional mobility and needs for safe d/c planning.  Prior to admission, pt was I with functional mobility, ADLs, and IADLs. However has been having increased difficulty with these tasks more recently d/t symptoms. Personal factors affecting status include home alone in 1lvl apt with 0STE, impulsivity, cog status, spinal precautions, medical status. Currently pt requires Supervision for bed mobility (impulsive), Min A for functional transfers with RW (impulsive) ; Mod A for ambulation with RW (impulsive). Pt presents functioning below baseline and w/ overall mobility deficits 2* to: decreased strength, decreased endurance, decreased mobility, impaired balance. These impairments place pt at risk for falls. Pt will continue to benefit from skilled PT interventions to address stated impairments; to maximize functional potential; for ongoing pt/family education; and DME needs. The patient's AM-PAC Basic Mobility Inpatient Short Form Raw Score Is 15. A Raw score of less than 16 suggests the patient may benefit from discharge to home. PT is currently recommending rehab on d/c from hospital due to poor safety awareness and unsafe mobility for home alone. Patient likely to refuse rehab and if so would benefit from [address]. Will continue to follow as able. PT Discharge Recommendation: (S) Post acute rehabilitation services (likely to refuse rehab, if refusing, then HHPT)    See flowsheet documentation for full assessment. Detail Level: Detailed Depth Of Biopsy: dermis Was A Bandage Applied: Yes Size Of Lesion In Cm: 0 Biopsy Type: H and E Biopsy Method: Dermablade Anesthesia Type: 1% lidocaine with epinephrine Anesthesia Volume In Cc: 0.5 Hemostasis: Drysol Wound Care: Petrolatum Dressing: bandage Destruction After The Procedure: No Type Of Destruction Used: Curettage Curettage Text: The wound bed was treated with curettage after the biopsy was performed. Cryotherapy Text: The wound bed was treated with cryotherapy after the biopsy was performed. Electrodesiccation Text: The wound bed was treated with electrodesiccation after the biopsy was performed. Electrodesiccation And Curettage Text: The wound bed was treated with electrodesiccation and curettage after the biopsy was performed. Silver Nitrate Text: The wound bed was treated with silver nitrate after the biopsy was performed. Lab: [id number] Consent: Written consent was obtained and risks were reviewed including but not limited to scarring, infection, bleeding, scabbing, incomplete removal, nerve damage and allergy to anesthesia. Post-Care Instructions: I reviewed with the patient in detail post-care instructions. Patient is to keep the biopsy site dry overnight, and then apply bacitracin twice daily until healed. Patient may apply hydrogen peroxide soaks to remove any crusting. Notification Instructions: Patient will be notified of biopsy results. However, patient instructed to call the office if not contacted within 2 weeks. Billing Type: Third-Party Bill Information: Selecting Yes will display possible errors in your note based on the variables you have selected. This validation is only offered as a suggestion for you. PLEASE NOTE THAT THE VALIDATION TEXT WILL BE REMOVED WHEN YOU FINALIZE YOUR NOTE. IF YOU WANT TO FAX A PRELIMINARY NOTE YOU WILL NEED TO TOGGLE THIS TO 'NO' IF YOU DO NOT WANT IT IN YOUR FAXED NOTE.

## 2024-11-18 NOTE — TELEPHONE ENCOUNTER
Mendel Nose called from [facility] about the referral for physical therapy for ambulatory dysfunction. Patient gave a difficult time, kept insisting its for aquatic therapy. He absolutely refused to do land therapy even though she thinks he would benefit from it. If it is aquatic therapy it needs to be written on the script and faxed to [phone]. Therapist does have some concerns about aquatic therapy for him though. She stated that he just had the staples removed on October 19th, however they dont usually see patients in the pool for at least 6 weeks post op. Also with his cervical collar he only has one. He removes the collar to shower but he would have to wear it in the pool and then he has nothing dry to put on afterwards. She would like a call back at [phone].  Please advise, thank you
No

## 2024-12-06 DIAGNOSIS — M70.41 PREPATELLAR BURSITIS OF RIGHT KNEE: ICD-10-CM

## 2024-12-06 RX ORDER — ALBUTEROL SULFATE 90 UG/1
1-2 INHALANT RESPIRATORY (INHALATION) EVERY 4 HOURS PRN
Qty: 18 G | Refills: 5 | Status: SHIPPED | OUTPATIENT
Start: 2024-12-06

## 2024-12-12 LAB
ALBUMIN SERPL-MCNC: 4.5 G/DL (ref 3.5–5.7)
ALP SERPL-CCNC: 97 U/L (ref 35–120)
ALT SERPL-CCNC: 11 U/L
ANION GAP SERPL CALCULATED.3IONS-SCNC: 7 MMOL/L (ref 3–11)
AST SERPL-CCNC: 13 U/L
BILIRUB SERPL-MCNC: 0.3 MG/DL (ref 0.2–1)
BUN SERPL-MCNC: 11 MG/DL (ref 7–28)
CALCIUM SERPL-MCNC: 9.9 MG/DL (ref 8.5–10.5)
CHLORIDE SERPL-SCNC: 102 MMOL/L (ref 100–109)
CO2 SERPL-SCNC: 30 MMOL/L (ref 21–31)
CREAT SERPL-MCNC: 0.75 MG/DL (ref 0.53–1.3)
CRP SERPL-MCNC: 1.2 MG/L
CYTOLOGY CMNT CVX/VAG CYTO-IMP: NORMAL
FOLATE SERPL-MCNC: 6.4 NG/ML
GFR/BSA.PRED SERPLBLD CYS-BASED-ARV: 105 ML/MIN/{1.73_M2}
GLUCOSE SERPL-MCNC: 91 MG/DL (ref 65–99)
MAGNESIUM SERPL-MCNC: 2 MG/DL (ref 1.4–2.2)
POTASSIUM SERPL-SCNC: 4.8 MMOL/L (ref 3.5–5.2)
PROT SERPL-MCNC: 6.7 G/DL (ref 6.3–8.3)
SODIUM SERPL-SCNC: 139 MMOL/L (ref 135–145)
T3FREE SERPL-MCNC: 3.25 PG/ML (ref 2.5–3.9)
TSH SERPL-ACNC: 3.5 UIU/ML (ref 0.45–5.33)
VIT B12 SERPL-MCNC: 903 PG/ML (ref 180–914)

## 2024-12-13 ENCOUNTER — OFFICE VISIT (OUTPATIENT)
Age: 59
End: 2024-12-13
Payer: COMMERCIAL

## 2024-12-13 VITALS
HEART RATE: 73 BPM | BODY MASS INDEX: 23.18 KG/M2 | SYSTOLIC BLOOD PRESSURE: 110 MMHG | WEIGHT: 130.8 LBS | TEMPERATURE: 98.9 F | OXYGEN SATURATION: 97 % | HEIGHT: 63 IN | DIASTOLIC BLOOD PRESSURE: 68 MMHG

## 2024-12-13 DIAGNOSIS — Z79.899 MEDICAL MARIJUANA USE: ICD-10-CM

## 2024-12-13 DIAGNOSIS — G62.9 NEUROPATHY: ICD-10-CM

## 2024-12-13 DIAGNOSIS — E78.1 HYPERTRIGLYCERIDEMIA: ICD-10-CM

## 2024-12-13 DIAGNOSIS — Z98.1 S/P CERVICAL SPINAL FUSION: Primary | ICD-10-CM

## 2024-12-13 DIAGNOSIS — G44.309 POST-TRAUMATIC HEADACHE, NOT INTRACTABLE, UNSPECIFIED CHRONICITY PATTERN: ICD-10-CM

## 2024-12-13 DIAGNOSIS — F17.210 CIGARETTE NICOTINE DEPENDENCE WITHOUT COMPLICATION: ICD-10-CM

## 2024-12-13 PROBLEM — D50.9 IRON DEFICIENCY ANEMIA: Status: RESOLVED | Noted: 2018-12-05 | Resolved: 2024-12-13

## 2024-12-13 PROBLEM — R94.6 ABNORMAL THYROID FUNCTION TEST: Status: RESOLVED | Noted: 2018-02-28 | Resolved: 2024-12-13

## 2024-12-13 LAB
BASOPHILS # BLD AUTO: 0 THOU/CMM (ref 0–0.1)
BASOPHILS NFR BLD AUTO: 0 %
DIFFERENTIAL METHOD BLD: ABNORMAL
EOSINOPHIL # BLD AUTO: 0.4 THOU/CMM (ref 0–0.5)
EOSINOPHIL NFR BLD AUTO: 4 %
ERYTHROCYTE [DISTWIDTH] IN BLOOD BY AUTOMATED COUNT: 13.5 % (ref 12–16)
HCT VFR BLD AUTO: 48.8 % (ref 37–48)
HGB BLD-MCNC: 16.1 G/DL (ref 12.5–17)
LYMPHOCYTES # BLD AUTO: 3.4 THOU/CMM (ref 1–3)
LYMPHOCYTES NFR BLD AUTO: 35 %
MCH RBC QN AUTO: 29.7 PG (ref 27–36)
MCHC RBC AUTO-ENTMCNC: 32.9 G/DL (ref 32–37)
MCV RBC AUTO: 90 FL (ref 80–100)
MONOCYTES # BLD AUTO: 1 THOU/CMM (ref 0.3–1)
MONOCYTES NFR BLD AUTO: 10 %
MORPHOLOGY BLD-IMP: ABNORMAL
NEUTROPHILS # BLD AUTO: 4.8 THOU/CMM (ref 1.8–7.8)
NEUTROPHILS NFR BLD AUTO: 49 %
PLATELET # BLD AUTO: 344 THOU/CMM (ref 140–350)
PMV BLD REES-ECKER: 8.7 FL (ref 7.5–11.3)
RBC # BLD AUTO: 5.41 MILL/CMM (ref 4–5.4)
VARIANT LYMPHS # BLD MANUAL: 0.2 THOU/CMM
VARIANT LYMPHS NFR BLD: 2 %
WBC # BLD AUTO: 9.6 THOU/CMM (ref 4–10.5)

## 2024-12-13 PROCEDURE — 99214 OFFICE O/P EST MOD 30 MIN: CPT | Performed by: INTERNAL MEDICINE

## 2024-12-13 RX ORDER — ACETAMINOPHEN 500 MG
500 TABLET ORAL EVERY 6 HOURS PRN
Qty: 100 TABLET | Refills: 1 | Status: SHIPPED | OUTPATIENT
Start: 2024-12-13

## 2024-12-13 RX ORDER — FOLIC ACID 0.8 MG
800 TABLET ORAL DAILY
Qty: 100 TABLET | Refills: 2 | Status: SHIPPED | OUTPATIENT
Start: 2024-12-13

## 2024-12-13 NOTE — ASSESSMENT & PLAN NOTE
Minimally elevated triglycerides in the past  Orders:    CBC and differential; Future    Comprehensive metabolic panel; Future    Lipid panel; Future
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
## 2025-01-06 ENCOUNTER — TELEPHONE (OUTPATIENT)
Age: 60
End: 2025-01-06

## 2025-01-09 DIAGNOSIS — R63.0 LOSS OF APPETITE: ICD-10-CM

## 2025-01-09 DIAGNOSIS — F31.60 BIPOLAR DISORDER, MIXED (HCC): ICD-10-CM

## 2025-01-10 RX ORDER — MIRTAZAPINE 15 MG/1
15 TABLET, FILM COATED ORAL
Qty: 30 TABLET | Refills: 5 | Status: SHIPPED | OUTPATIENT
Start: 2025-01-10

## 2025-01-23 ENCOUNTER — TELEPHONE (OUTPATIENT)
Age: 60
End: 2025-01-23

## 2025-01-23 DIAGNOSIS — J06.9 VIRAL URI: Primary | ICD-10-CM

## 2025-01-23 RX ORDER — HYDROXYZINE HYDROCHLORIDE 10 MG/5ML
4 SYRUP ORAL EVERY 6 HOURS PRN
Qty: 30 TABLET | Refills: 0 | Status: SHIPPED | OUTPATIENT
Start: 2025-01-23 | End: 2025-01-24

## 2025-01-23 NOTE — TELEPHONE ENCOUNTER
Pt called requesting medication for congestion and a runny nose.  Pt cannot smell or taste.  This has been going on for the past 2 days.    Pt did not want to come in the office but would like Dr Varghese to send a prescription to:    [facility] [place] - [address] [address]  Phone: [phone]  Fax: [phone]

## 2025-01-23 NOTE — TELEPHONE ENCOUNTER
Patient called back to see if Dr. Varghese can send a medication for symptoms.  I did try to schedule appointment but patient declined.  He asked if we can call him back if medication can be sent.    Thank you

## 2025-01-24 ENCOUNTER — TELEPHONE (OUTPATIENT)
Dept: INTERNAL MEDICINE CLINIC | Facility: OTHER | Age: 60
End: 2025-01-24

## 2025-01-24 ENCOUNTER — TELEPHONE (OUTPATIENT)
Age: 60
End: 2025-01-24

## 2025-01-24 DIAGNOSIS — J44.1 ACUTE EXACERBATION OF CHRONIC OBSTRUCTIVE PULMONARY DISEASE (COPD) (HCC): Primary | ICD-10-CM

## 2025-01-24 RX ORDER — FEXOFENADINE HCL AND PSEUDOEPHEDRINE HCI 60; 120 MG/1; MG/1
1 TABLET, EXTENDED RELEASE ORAL 2 TIMES DAILY PRN
Qty: 60 TABLET | Refills: 0 | Status: SHIPPED | OUTPATIENT
Start: 2025-01-24

## 2025-01-24 NOTE — TELEPHONE ENCOUNTER
Patient called , very upset and frustrated.. still can not get his medication . Pharmacy advised him , his insurance does not cover it. Can not wait for PA . Needs a new prescription that will be covered by his insurance and something that he can  today. Please advise

## 2025-01-24 NOTE — TELEPHONE ENCOUNTER
Received medication prior authorization from [facility] for the following medication:    chlorpheniramine (CHLOR-TRIMETON) 4 MG tablet [[id number]]    Order Details  Dose: 4 mg Route: Oral Frequency: Every 6 hours PRN for rhinitis   Dispense Quantity: 30 tablet Refills: 0          Sig: Take 1 tablet (4 mg total) by mouth every 6 (six) hours as needed for rhinitis         Start Date: 01/23/25 End Date: --   Written Date: 01/23/25 Expiration Date: 01/23/26       Associated Diagnoses: Viral URI [J06.9]     Form scanned into patients chart.

## 2025-01-24 NOTE — TELEPHONE ENCOUNTER
[facility], called regarding the following:     Chlorpheniramine is non-formulary and is not covered by his insurance.     The pharmacy is asking for an alternative to be sent in. Hydroxyzine and Allegra D are covered by patient's insurance.     Nasal sprays: Ipratropium Bromide 20 mcg per spray and Budesonide are also covered.

## 2025-01-28 NOTE — TELEPHONE ENCOUNTER
Dr Varghese sent RX for Fexofenadine-Pseudoephedrine to [facility] pharmacy on 1/24/25.

## 2025-02-07 DIAGNOSIS — G44.309 POST-TRAUMATIC HEADACHE, NOT INTRACTABLE, UNSPECIFIED CHRONICITY PATTERN: ICD-10-CM

## 2025-02-07 RX ORDER — PSEUDOEPHED/ACETAMINOPH/DIPHEN 30MG-500MG
TABLET ORAL
Qty: 100 TABLET | Refills: 1 | Status: SHIPPED | OUTPATIENT
Start: 2025-02-07

## 2025-02-13 DIAGNOSIS — G44.309 POST-TRAUMATIC HEADACHE, NOT INTRACTABLE, UNSPECIFIED CHRONICITY PATTERN: ICD-10-CM

## 2025-02-13 DIAGNOSIS — J44.1 ACUTE EXACERBATION OF CHRONIC OBSTRUCTIVE PULMONARY DISEASE (COPD) (HCC): ICD-10-CM

## 2025-02-13 RX ORDER — PSEUDOEPHED/ACETAMINOPH/DIPHEN 30MG-500MG
TABLET ORAL
Qty: 100 TABLET | Refills: 1 | OUTPATIENT
Start: 2025-02-13

## 2025-02-13 RX ORDER — FEXOFENADINE HCL AND PSEUDOEPHEDRINE HCI 60; 120 MG/1; MG/1
1 TABLET, EXTENDED RELEASE ORAL 2 TIMES DAILY PRN
Qty: 60 TABLET | Refills: 0 | Status: SHIPPED | OUTPATIENT
Start: 2025-02-13

## 2025-02-13 NOTE — TELEPHONE ENCOUNTER
Medication: Acetaminophen Extra Strength 500 MG TABS     Dose/Frequency: TAKE 1 TABLET (500 MG TOTAL) BY MOUTH EVERY 6 (SIX) HOURS AS NEEDED FOR MILD PAIN     Quantity:  100 tablet     Pharmacy: [address] [phone]     Office:   [x] PCP/Provider - Boby Varghese MD   [] Speciality/Provider -     Does the patient have enough for 3 days?   [x] Yes   [] No - Send as HP to POD        Medication: fexofenadine-pseudoephedrine (ALLEGRA-D)  MG per tablet     Dose/Frequency: Take 1 tablet by mouth 2 (two) times a day as needed for allergies     Quantity:  60 tablet     Pharmacy: [facility] [address] [place] [address] [phone]     Office:   [x] PCP/Provider - Boby Varghese MD   [] Speciality/Provider -     Does the patient have enough for 3 days?   [x] Yes   [] No - Send as HP to POD

## 2025-02-17 DIAGNOSIS — J44.1 ACUTE EXACERBATION OF CHRONIC OBSTRUCTIVE PULMONARY DISEASE (COPD) (HCC): ICD-10-CM

## 2025-02-17 DIAGNOSIS — M70.41 PREPATELLAR BURSITIS OF RIGHT KNEE: ICD-10-CM

## 2025-02-18 RX ORDER — ALBUTEROL SULFATE 90 UG/1
1-2 INHALANT RESPIRATORY (INHALATION) EVERY 4 HOURS PRN
Qty: 18 G | Refills: 5 | OUTPATIENT
Start: 2025-02-18

## 2025-02-18 RX ORDER — FEXOFENADINE HCL AND PSEUDOEPHEDRINE HCI 60; 120 MG/1; MG/1
1 TABLET, EXTENDED RELEASE ORAL 2 TIMES DAILY PRN
Qty: 60 TABLET | Refills: 0 | OUTPATIENT
Start: 2025-02-18

## 2025-03-04 DIAGNOSIS — M70.41 PREPATELLAR BURSITIS OF RIGHT KNEE: ICD-10-CM

## 2025-03-05 RX ORDER — ALBUTEROL SULFATE 90 UG/1
1-2 INHALANT RESPIRATORY (INHALATION) EVERY 4 HOURS PRN
Qty: 18 G | Refills: 5 | Status: SHIPPED | OUTPATIENT
Start: 2025-03-05

## 2025-04-07 DIAGNOSIS — K21.9 GASTROESOPHAGEAL REFLUX DISEASE WITHOUT ESOPHAGITIS: ICD-10-CM

## 2025-04-07 DIAGNOSIS — K21.9 GERD WITHOUT ESOPHAGITIS: ICD-10-CM

## 2025-04-08 RX ORDER — OMEPRAZOLE 40 MG/1
40 CAPSULE, DELAYED RELEASE ORAL DAILY
Qty: 30 CAPSULE | Refills: 5 | Status: SHIPPED | OUTPATIENT
Start: 2025-04-08

## 2025-04-09 ENCOUNTER — TELEPHONE (OUTPATIENT)
Age: 60
End: 2025-04-09

## 2025-04-09 NOTE — TELEPHONE ENCOUNTER
PA for omeprazole (PriLOSEC) [facility]     via    [x]CMM-KEY: [id number]      [x]PA sent as URGENT    All office notes, labs and other pertaining documents and studies sent. Clinical questions answered. Awaiting determination from insurance company.     Turnaround time for your insurance to make a decision on your Prior Authorization can take 7-21 business days.

## 2025-05-06 NOTE — ASSESSMENT & PLAN NOTE
Airway  Reason: elective    Date/Time: 5/6/2025 10:18 AM  Airway not difficult    General Information and Staff    Patient location during procedure: OR  Anesthesiologist: Eder Love MD  CRNA/CAA: Olga Dash CRNA    Indications and Patient Condition  Indications for airway management: airway protection    Preoxygenated: yes  MILS maintained throughout    Mask difficulty assessment: 1 - vent by mask    Final Airway Details    Final airway type: endotracheal airway      Successful airway: ETT  Cuffed: yes   Successful intubation technique: direct laryngoscopy  Adjuncts used in placement: intubating stylet  Endotracheal tube insertion site: oral  Blade: Alan  Blade size: 3  ETT size (mm): 7.0  Cormack-Lehane Classification: grade I - full view of glottis  Placement verified by: chest auscultation and capnometry   Cuff volume (mL): 6  Measured from: teeth  ETT/EBT  to teeth (cm): 21  Number of attempts at approach: 1  Assessment: lips, teeth, and gum same as pre-op and atraumatic intubation             Stable at this time following with Psychiatry

## 2025-05-12 DIAGNOSIS — F31.60 BIPOLAR DISORDER, MIXED (HCC): ICD-10-CM

## 2025-05-12 DIAGNOSIS — R63.0 LOSS OF APPETITE: ICD-10-CM

## 2025-05-12 RX ORDER — MIRTAZAPINE 15 MG/1
15 TABLET, FILM COATED ORAL
Qty: 30 TABLET | Refills: 0 | Status: SHIPPED | OUTPATIENT
Start: 2025-05-12

## 2025-06-03 DIAGNOSIS — M70.41 PREPATELLAR BURSITIS OF RIGHT KNEE: ICD-10-CM

## 2025-06-04 RX ORDER — ALBUTEROL SULFATE 90 UG/1
1-2 INHALANT RESPIRATORY (INHALATION) EVERY 4 HOURS PRN
Qty: 18 G | Refills: 5 | Status: SHIPPED | OUTPATIENT
Start: 2025-06-04

## 2025-06-10 ENCOUNTER — OFFICE VISIT (OUTPATIENT)
Age: 60
End: 2025-06-10
Payer: COMMERCIAL

## 2025-06-10 VITALS
HEIGHT: 64 IN | WEIGHT: 138 LBS | BODY MASS INDEX: 23.56 KG/M2 | DIASTOLIC BLOOD PRESSURE: 76 MMHG | TEMPERATURE: 99.1 F | SYSTOLIC BLOOD PRESSURE: 120 MMHG | OXYGEN SATURATION: 96 % | HEART RATE: 82 BPM

## 2025-06-10 DIAGNOSIS — J42 CHRONIC BRONCHITIS, UNSPECIFIED CHRONIC BRONCHITIS TYPE (HCC): ICD-10-CM

## 2025-06-10 DIAGNOSIS — J30.2 SEASONAL ALLERGIES: Primary | ICD-10-CM

## 2025-06-10 DIAGNOSIS — K21.9 GERD WITHOUT ESOPHAGITIS: ICD-10-CM

## 2025-06-10 DIAGNOSIS — E78.5 DYSLIPIDEMIA: ICD-10-CM

## 2025-06-10 DIAGNOSIS — F17.210 CIGARETTE NICOTINE DEPENDENCE WITHOUT COMPLICATION: ICD-10-CM

## 2025-06-10 PROBLEM — J44.9 COPD (CHRONIC OBSTRUCTIVE PULMONARY DISEASE) (HCC): Status: ACTIVE | Noted: 2025-06-10

## 2025-06-10 PROBLEM — J44.1 ACUTE EXACERBATION OF CHRONIC OBSTRUCTIVE PULMONARY DISEASE (COPD) (HCC): Status: RESOLVED | Noted: 2024-01-17 | Resolved: 2025-06-10

## 2025-06-10 PROCEDURE — 99214 OFFICE O/P EST MOD 30 MIN: CPT | Performed by: INTERNAL MEDICINE

## 2025-06-10 RX ORDER — FEXOFENADINE HCL AND PSEUDOEPHEDRINE HCL 180; 240 MG/1; MG/1
1 TABLET, EXTENDED RELEASE ORAL DAILY
Qty: 30 TABLET | Refills: 3 | Status: SHIPPED | OUTPATIENT
Start: 2025-06-10

## 2025-06-10 NOTE — PROGRESS NOTES
Name: Chris R Billig      : 1965      MRN: [id number]  Encounter Provider: Boby Varghese MD  Encounter Date: 6/10/2025   Encounter department: [facility]  :  Assessment & Plan  Seasonal allergies  Change rx to 24 hr Allegra       Dyslipidemia  No medications at this time       Cigarette nicotine dependence without complication  Encouraged  cessation       Chronic bronchitis, unspecified chronic bronchitis type (HCC)  Not requiring long acting medication. Rescue inhalers only.             History of Present Illness   Patient presents to the office for follow-up visit.  He has no particular complaints at this time.  He did not have any lab studies drawn prior to this visit which had been ordered back in December.  He is complaining of persistent sinus congestion due to allergies.  He has been taking 12-hour Allegra tablet but only once daily.  He is not been using any intranasal steroids.  He continues to smoke.  He would like to stop but he has no motivation.  He has no other complaints at this time.  Intermittent symptoms of dyspepsia which is controlled with the use of omeprazole.  Asthma/COPD is controlled with his rescue inhaler.      Review of Systems   Constitutional: Negative.    HENT:  Positive for congestion (Intermittent).    Eyes: Negative.    Respiratory:  Positive for cough and wheezing (Occasional). Negative for apnea, choking, chest tightness, shortness of breath and stridor.    Cardiovascular:  Negative for chest pain, palpitations and leg swelling.   Gastrointestinal: Negative.    Endocrine: Negative.    Genitourinary: Negative.    Musculoskeletal: Negative.    Skin: Negative.    Allergic/Immunologic: Negative.    Neurological: Negative.    Hematological: Negative.    Psychiatric/Behavioral: Negative.         Objective   /76 (BP Location: Left arm, Patient Position: Sitting, Cuff Size: Standard)   Pulse 82   Temp 99.1 °F (37.3 °C)  "(Temporal)   Ht 5' 4\" (1.626 m)   Wt 62.6 kg (138 lb)   SpO2 96%   BMI 23.69 kg/m²      Physical Exam  Vitals reviewed.   Constitutional:       General: He is not in acute distress.     Appearance: Normal appearance. He is normal weight. He is not ill-appearing, toxic-appearing or diaphoretic.   HENT:      Head: Normocephalic and atraumatic.      Right Ear: External ear normal.      Left Ear: External ear normal.      Nose: Nose normal.      Mouth/Throat:      Mouth: Mucous membranes are moist.      Pharynx: Oropharynx is clear.     Eyes:      Conjunctiva/sclera: Conjunctivae normal.      Pupils: Pupils are equal, round, and reactive to light.     Neck:      Vascular: No carotid bruit.     Cardiovascular:      Rate and Rhythm: Regular rhythm.      Heart sounds: Normal heart sounds. No murmur heard.  Pulmonary:      Effort: Pulmonary effort is normal. No respiratory distress.      Breath sounds: No stridor. Rhonchi (Coarse rhonchi throughout both lung fields) present. No wheezing or rales.   Chest:      Chest wall: No tenderness.   Abdominal:      General: Abdomen is flat. There is no distension.      Tenderness: There is no abdominal tenderness.     Musculoskeletal:      Cervical back: Neck supple. No rigidity.      Right lower leg: No edema.      Left lower leg: No edema.   Lymphadenopathy:      Cervical: No cervical adenopathy.     Skin:     General: Skin is warm.      Coloration: Skin is not jaundiced.      Findings: No bruising, erythema or rash.     Neurological:      General: No focal deficit present.      Mental Status: He is alert and oriented to person, place, and time. Mental status is at baseline.     Psychiatric:         Mood and Affect: Mood normal.         Thought Content: Thought content normal.         "

## 2025-06-26 DIAGNOSIS — G44.309 POST-TRAUMATIC HEADACHE, NOT INTRACTABLE, UNSPECIFIED CHRONICITY PATTERN: ICD-10-CM

## 2025-06-26 RX ORDER — PSEUDOEPHED/ACETAMINOPH/DIPHEN 30MG-500MG
TABLET ORAL
Qty: 100 TABLET | Refills: 1 | Status: SHIPPED | OUTPATIENT
Start: 2025-06-26 | End: 2025-06-26 | Stop reason: SDUPTHER

## 2025-06-26 RX ORDER — PSEUDOEPHED/ACETAMINOPH/DIPHEN 30MG-500MG
TABLET ORAL
Qty: 100 TABLET | Refills: 1 | OUTPATIENT
Start: 2025-06-26

## 2025-06-26 NOTE — TELEPHONE ENCOUNTER
Added 5 refills to existing prescription.   Patient does not want to have to call the office every month to get refills.

## 2025-06-27 ENCOUNTER — TELEPHONE (OUTPATIENT)
Age: 60
End: 2025-06-27

## 2025-06-29 RX ORDER — PSEUDOEPHED/ACETAMINOPH/DIPHEN 30MG-500MG
500 TABLET ORAL EVERY 6 HOURS PRN
Qty: 100 TABLET | Refills: 5 | Status: SHIPPED | OUTPATIENT
Start: 2025-06-29

## 2025-07-08 DIAGNOSIS — R63.0 LOSS OF APPETITE: ICD-10-CM

## 2025-07-08 DIAGNOSIS — F31.60 BIPOLAR DISORDER, MIXED (HCC): ICD-10-CM

## 2025-07-10 RX ORDER — MIRTAZAPINE 15 MG/1
15 TABLET, FILM COATED ORAL
Qty: 30 TABLET | Refills: 0 | Status: SHIPPED | OUTPATIENT
Start: 2025-07-10 | End: 2025-07-18 | Stop reason: SDUPTHER

## 2025-07-17 LAB
ALBUMIN SERPL-MCNC: 4.8 G/DL (ref 3.5–5.7)
ALP SERPL-CCNC: 100 U/L (ref 35–120)
ALT SERPL-CCNC: 16 U/L
ANION GAP SERPL CALCULATED.3IONS-SCNC: 11 MMOL/L (ref 3–11)
AST SERPL-CCNC: 20 U/L
BASOPHILS # BLD AUTO: 0.1 THOU/CMM (ref 0–0.1)
BASOPHILS NFR BLD AUTO: 0 %
BILIRUB SERPL-MCNC: 0.6 MG/DL (ref 0.2–1)
BUN SERPL-MCNC: 16 MG/DL (ref 7–28)
CALCIUM SERPL-MCNC: 9.8 MG/DL (ref 8.5–10.5)
CHLORIDE SERPL-SCNC: 103 MMOL/L (ref 100–109)
CHOLEST SERPL-MCNC: 247 MG/DL
CHOLEST/HDLC SERPL: 4.8 {RATIO}
CO2 SERPL-SCNC: 28 MMOL/L (ref 21–31)
CREAT SERPL-MCNC: 0.84 MG/DL (ref 0.53–1.3)
CYTOLOGY CMNT CVX/VAG CYTO-IMP: ABNORMAL
DIFFERENTIAL METHOD BLD: ABNORMAL
EOSINOPHIL # BLD AUTO: 0.2 THOU/CMM (ref 0–0.5)
EOSINOPHIL NFR BLD AUTO: 1 %
ERYTHROCYTE [DISTWIDTH] IN BLOOD BY AUTOMATED COUNT: 13.8 % (ref 12–16)
FOLATE SERPL-MCNC: >22.3 NG/ML
GFR/BSA.PRED SERPLBLD CYS-BASED-ARV: 101 ML/MIN/{1.73_M2}
GLUCOSE SERPL-MCNC: 174 MG/DL (ref 65–99)
HCT VFR BLD AUTO: 49.2 % (ref 37–48)
HDLC SERPL-MCNC: 51 MG/DL (ref 23–92)
HGB BLD-MCNC: 16.2 G/DL (ref 12.5–17)
LDLC SERPL CALC-MCNC: 122 MG/DL
LYMPHOCYTES # BLD AUTO: 2.2 THOU/CMM (ref 1–3)
LYMPHOCYTES NFR BLD AUTO: 13 %
MCH RBC QN AUTO: 29.6 PG (ref 27–36)
MCHC RBC AUTO-ENTMCNC: 32.9 G/DL (ref 32–37)
MCV RBC AUTO: 90 FL (ref 80–100)
MONOCYTES # BLD AUTO: 1 THOU/CMM (ref 0.3–1)
MONOCYTES NFR BLD AUTO: 6 %
NEUTROPHILS # BLD AUTO: 12.9 THOU/CMM (ref 1.8–7.8)
NEUTROPHILS NFR BLD AUTO: 80 %
NONHDLC SERPL-MCNC: 196 MG/DL
PLATELET # BLD AUTO: 308 THOU/CMM (ref 140–350)
PMV BLD REES-ECKER: 8.9 FL (ref 7.5–11.3)
POTASSIUM SERPL-SCNC: 4.3 MMOL/L (ref 3.5–5.2)
PROT SERPL-MCNC: 7.3 G/DL (ref 6.3–8.3)
RBC # BLD AUTO: 5.48 MILL/CMM (ref 4–5.4)
SODIUM SERPL-SCNC: 142 MMOL/L (ref 135–145)
TRIGL SERPL-MCNC: 371 MG/DL
WBC # BLD AUTO: 16.3 THOU/CMM (ref 4–10.5)

## 2025-07-18 ENCOUNTER — OFFICE VISIT (OUTPATIENT)
Age: 60
End: 2025-07-18
Payer: COMMERCIAL

## 2025-07-18 ENCOUNTER — NURSE TRIAGE (OUTPATIENT)
Age: 60
End: 2025-07-18

## 2025-07-18 VITALS
HEIGHT: 64 IN | BODY MASS INDEX: 22.71 KG/M2 | DIASTOLIC BLOOD PRESSURE: 72 MMHG | HEART RATE: 86 BPM | WEIGHT: 133 LBS | TEMPERATURE: 99 F | SYSTOLIC BLOOD PRESSURE: 116 MMHG | OXYGEN SATURATION: 96 %

## 2025-07-18 DIAGNOSIS — J30.2 SEASONAL ALLERGIES: ICD-10-CM

## 2025-07-18 DIAGNOSIS — K21.9 GASTROESOPHAGEAL REFLUX DISEASE WITHOUT ESOPHAGITIS: ICD-10-CM

## 2025-07-18 DIAGNOSIS — R63.0 LOSS OF APPETITE: ICD-10-CM

## 2025-07-18 DIAGNOSIS — F31.60 BIPOLAR DISORDER, MIXED (HCC): ICD-10-CM

## 2025-07-18 DIAGNOSIS — L98.9 NON-HEALING SKIN LESION: Primary | ICD-10-CM

## 2025-07-18 DIAGNOSIS — G62.9 NEUROPATHY: ICD-10-CM

## 2025-07-18 DIAGNOSIS — L20.9 MILD ATOPIC DERMATITIS: ICD-10-CM

## 2025-07-18 DIAGNOSIS — K21.9 GERD WITHOUT ESOPHAGITIS: ICD-10-CM

## 2025-07-18 DIAGNOSIS — J44.1 ACUTE EXACERBATION OF CHRONIC OBSTRUCTIVE PULMONARY DISEASE (COPD) (HCC): ICD-10-CM

## 2025-07-18 PROCEDURE — 99214 OFFICE O/P EST MOD 30 MIN: CPT | Performed by: INTERNAL MEDICINE

## 2025-07-18 RX ORDER — FEXOFENADINE HCL AND PSEUDOEPHEDRINE HCL 180; 240 MG/1; MG/1
1 TABLET, EXTENDED RELEASE ORAL DAILY
Qty: 30 TABLET | Refills: 5 | Status: SHIPPED | OUTPATIENT
Start: 2025-07-18

## 2025-07-18 RX ORDER — MIRTAZAPINE 15 MG/1
15 TABLET, FILM COATED ORAL
Qty: 30 TABLET | Refills: 5 | Status: SHIPPED | OUTPATIENT
Start: 2025-07-18

## 2025-07-18 RX ORDER — OMEPRAZOLE 40 MG/1
40 CAPSULE, DELAYED RELEASE ORAL DAILY
Qty: 30 CAPSULE | Refills: 5 | Status: SHIPPED | OUTPATIENT
Start: 2025-07-18

## 2025-07-18 RX ORDER — FOLIC ACID 0.8 MG
800 TABLET ORAL DAILY
Qty: 30 TABLET | Refills: 5 | Status: SHIPPED | OUTPATIENT
Start: 2025-07-18

## 2025-07-18 RX ORDER — PREDNISONE 20 MG/1
40 TABLET ORAL DAILY
Qty: 10 TABLET | Refills: 0 | Status: SHIPPED | OUTPATIENT
Start: 2025-07-18 | End: 2025-07-23

## 2025-07-18 RX ORDER — AZITHROMYCIN 250 MG/1
TABLET, FILM COATED ORAL
Qty: 6 TABLET | Refills: 0 | Status: SHIPPED | OUTPATIENT
Start: 2025-07-18 | End: 2025-07-23

## 2025-07-18 RX ORDER — CLOTRIMAZOLE AND BETAMETHASONE DIPROPIONATE 10; .64 MG/G; MG/G
CREAM TOPICAL 2 TIMES DAILY
Qty: 15 G | Refills: 1 | Status: SHIPPED | OUTPATIENT
Start: 2025-07-18

## 2025-07-18 NOTE — ASSESSMENT & PLAN NOTE
Orders:    mirtazapine (REMERON) 15 mg tablet; Take 1 tablet (15 mg total) by mouth daily at bedtime

## 2025-07-18 NOTE — TELEPHONE ENCOUNTER
"REASON FOR CONVERSATION: Medication Problem    SYMPTOMS: Pharmacy unable to get LOTRISONE  cream until Monday. Pharmacy can compound and deliver today. No additional cost to patent. Dr. Varghese gave verbal order-okay to compound    PROTOCOL DISPOSITION: No disposition on file.    CARE ADVICE PROVIDED: none    PRACTICE FOLLOW-UP: none    Reason for Disposition   Prescription not at pharmacy and was prescribed by PCP recently  (Exception: triager has access to EMR and prescription is recorded there. Go to Home Care and confirm for pharmacy.)    Answer Assessment - Initial Assessment Questions  1. NAME of MEDICINE: \"What medicine(s) are you calling about?\"      Lotrisone cream  2. QUESTION: \"What is your question?\" (e.g., double dose of medicine, side effect)      Pharmacy unable to get Lotrisone cream until Monday but, they can compound it for patient today  3. PRESCRIBER: \"Who prescribed the medicine?\" Reason: if prescribed by specialist, call should be referred to that group.      Dr. Varghese gave verbal order-okay to compound    Protocols used: Medication Question Call-Adult-[place]    "

## 2025-07-18 NOTE — PROGRESS NOTES
Name: Chris R Billig      : 1965      MRN: [id number]  Encounter Provider: Boby Varghese MD  Encounter Date: 2025   Encounter department: [facility]  :  Assessment & Plan  Bipolar disorder, mixed (HCC)    Orders:    mirtazapine (REMERON) 15 mg tablet; Take 1 tablet (15 mg total) by mouth daily at bedtime    Loss of appetite    Orders:    mirtazapine (REMERON) 15 mg tablet; Take 1 tablet (15 mg total) by mouth daily at bedtime    Gastroesophageal reflux disease without esophagitis    Orders:    omeprazole (PriLOSEC) 40 MG capsule; Take 1 capsule (40 mg total) by mouth daily    GERD without esophagitis    Orders:    omeprazole (PriLOSEC) 40 MG capsule; Take 1 capsule (40 mg total) by mouth daily    Neuropathy    Orders:    folic acid (KP Folic Acid) 800 MCG tablet; Take 1 tablet (800 mcg total) by mouth daily    Seasonal allergies    Orders:    fexofenadine-pseudoephedrine (ALLEGRA-D 24) 180-240 MG per 24 hr tablet; Take 1 tablet by mouth daily    Non-healing skin lesion         Mild atopic dermatitis    Orders:    Ambulatory Referral to Dermatology; Future    clotrimazole-betamethasone (LOTRISONE) 1-0.05 % cream; Apply topically 2 (two) times a day    Acute exacerbation of chronic obstructive pulmonary disease (COPD) ([facility])    Orders:    azithromycin (Zithromax) 250 mg tablet; Take 2 tablets (500 mg total) by mouth daily for 1 day, THEN 1 tablet (250 mg total) daily for 4 days.    predniSONE 20 mg tablet; Take 2 tablets (40 mg total) by mouth daily for 5 days    CBC and differential; Future           History of Present Illness   Patient presents to the office with some skin related complaints.  He has a nonhealing lesion below his right eye in the upper cheek.  It usually forms a central scab which falls off leaving a rounded lesion with central depression behind.  There is never been any bleeding.  He also complains of some pale lesions on his left forearm  "which appear to be consistent with small patches of vitiligo.  His concern is that these lesions are constantly itchy.  He also complains of an area of irritation over his left 4th and 5th metatarsals that becomes irritated which resulted in him scratching the affected area and causing some further irritation.  He had lab work drawn yesterday which was reviewed.  Lipids are mildly elevated cholesterol 247.  Triglycerides 371, LDL cholesterol 122 and HDL cholesterol 51.  CBC shows a significant leukocytosis with a white count of 16,300 with a left shift.  Hemoglobin 16.2 platelets normal at 308,000.      Review of Systems   Constitutional: Negative.    HENT: Negative.     Eyes: Negative.    Respiratory:  Positive for cough and shortness of breath.    Cardiovascular: Negative.    Gastrointestinal: Negative.    Endocrine: Negative.    Genitourinary: Negative.    Musculoskeletal: Negative.    Skin:  Positive for rash (Left forefoot) and wound (Right upper cheek).   Neurological: Negative.    Hematological: Negative.    Psychiatric/Behavioral: Negative.         Objective   /72 (BP Location: Left arm, Patient Position: Sitting, Cuff Size: Standard)   Pulse 86   Temp 99 °F (37.2 °C) (Temporal)   Ht 5' 4\" (1.626 m)   Wt 60.3 kg (133 lb)   SpO2 96%   BMI 22.83 kg/m²      Physical Exam  Vitals reviewed.   Constitutional:       General: He is not in acute distress.     Appearance: Normal appearance. He is normal weight. He is not ill-appearing, toxic-appearing or diaphoretic.   HENT:      Head: Normocephalic and atraumatic.      Right Ear: External ear normal.      Left Ear: External ear normal.      Nose: Nose normal.      Mouth/Throat:      Mouth: Mucous membranes are moist.      Pharynx: Oropharynx is clear.     Eyes:      General: No scleral icterus.     Conjunctiva/sclera: Conjunctivae normal.      Pupils: Pupils are equal, round, and reactive to light.       Cardiovascular:      Rate and Rhythm: Normal rate " and regular rhythm.      Heart sounds: Normal heart sounds.   Pulmonary:      Effort: Pulmonary effort is normal. No respiratory distress.      Breath sounds: Rhonchi (Expiratory rhonchi) present. No wheezing (Expiratory wheezing and rhonchi throughout both lung fields) or rales.   Abdominal:      General: Abdomen is flat. There is no distension.     Musculoskeletal:      Cervical back: Neck supple. No rigidity.      Right lower leg: No edema.      Left lower leg: No edema.     Skin:     General: Skin is warm.      Coloration: Skin is not jaundiced.      Findings: No bruising, erythema or rash.     Neurological:      General: No focal deficit present.      Mental Status: He is alert and oriented to person, place, and time. Mental status is at baseline.     Psychiatric:         Mood and Affect: Mood normal.

## 2025-07-22 DIAGNOSIS — L30.4 INTERTRIGO: Primary | ICD-10-CM

## 2025-07-22 RX ORDER — CLOTRIMAZOLE 1 %
CREAM (GRAM) TOPICAL 2 TIMES DAILY
Qty: 30 G | Refills: 1 | Status: SHIPPED | OUTPATIENT
Start: 2025-07-22

## 2025-07-22 NOTE — TELEPHONE ENCOUNTER
Patient called back because he was confused as to why he got 2 different creams.     Explained tat the combined medication (Clotrimazole-Betamethasone) prescribed by the pcp was not available at his pharmacy so they gave him the 2 components separately.     Patient understood.

## 2025-08-01 ENCOUNTER — TELEPHONE (OUTPATIENT)
Dept: INTERNAL MEDICINE CLINIC | Facility: OTHER | Age: 60
End: 2025-08-01

## 2025-08-12 ENCOUNTER — TELEPHONE (OUTPATIENT)
Age: 60
End: 2025-08-12

## (undated) DEVICE — SPECIMEN CONTAINER STERILE PEEL PACK

## (undated) DEVICE — MEDI-VAC YANKAUER SUCTION HANDLE W/BULBOUS AND CONTROL VENT: Brand: CARDINAL HEALTH

## (undated) DEVICE — WEBRIL 6 IN UNSTERILE

## (undated) DEVICE — NEEDLE HYPO 22G X 1-1/2 IN

## (undated) DEVICE — 10FR FRAZIER SUCTION HANDLE: Brand: CARDINAL HEALTH

## (undated) DEVICE — EXPRESSEW III SUTURE NEEDLE FOR USE WITH EXPRESSEW II OR III SUTURE PASSER: Brand: EXPRESSEW

## (undated) DEVICE — POSITIONER OSI BEACH CHAIR FOAM

## (undated) DEVICE — MINOR PROCEDURE DRAPE: Brand: CONVERTORS

## (undated) DEVICE — BURR  OVAL 4MM 13CM 8 FLUTE COOLCUT

## (undated) DEVICE — ADHESIVE SKIN HIGH VISCOSITY EXOFIN 1ML

## (undated) DEVICE — KERLIX BANDAGE ROLL: Brand: KERLIX

## (undated) DEVICE — CYSTO TUBING TUR Y IRRIGATION

## (undated) DEVICE — NEEDLE 18 G X 1 1/2

## (undated) DEVICE — MAYFIELD® DISPOSABLE ADULT SKULL PIN (PLASTIC BASE): Brand: MAYFIELD®

## (undated) DEVICE — HEMOSTATIC MATRIX SURGIFLO 8ML W/THROMBIN

## (undated) DEVICE — SYRINGE 20ML LL

## (undated) DEVICE — THE SIMPULSE SOLO SYSTEM WITH ULTREX RETRACTABLE SPLASH SHIELD TIP: Brand: SIMPULSE SOLO

## (undated) DEVICE — THREADED CLEAR CANNULA WITH OBTURATOR 7MM X 75MM

## (undated) DEVICE — ANTIBACTERIAL VIOLET BRAIDED (POLYGLACTIN 910), SYNTHETIC ABSORBABLE SUTURE: Brand: COATED VICRYL

## (undated) DEVICE — PLUMEPEN PRO 10FT

## (undated) DEVICE — GLOVE INDICATOR PI UNDERGLOVE SZ 7.5 BLUE

## (undated) DEVICE — POSITIONER TRIMANO LIMB BEACH CHAIR

## (undated) DEVICE — MONITORING SPINAL IMPULSE CASE FEE

## (undated) DEVICE — PADDING CAST 6IN COTTON STRL

## (undated) DEVICE — OCCLUSIVE GAUZE STRIP,3% BISMUTH TRIBROMOPHENATE IN PETROLATUM BLEND: Brand: XEROFORM

## (undated) DEVICE — GLOVE INDICATOR PI UNDERGLOVE SZ 8.5 BLUE

## (undated) DEVICE — VAPR COOLPULSE 90 ELECTRODE 90 DEGREES SUCTION WITH INTEGRATED HANDPIECE: Brand: VAPR COOLPULSE

## (undated) DEVICE — MAYO STAND COVER: Brand: CONVERTORS

## (undated) DEVICE — CHEST ROLL FOAM POSITIONER: Brand: CARDINAL HEALTH

## (undated) DEVICE — CHLORAPREP HI-LITE 26ML ORANGE

## (undated) DEVICE — THREADED CLEAR CANNULA WITH OBTURATOR 5.5MM X 75MM

## (undated) DEVICE — 3M™ STERI-DRAPE™ U-DRAPE 1015: Brand: STERI-DRAPE™

## (undated) DEVICE — TOOL MR8-14MH30 MR8 14CM MATCH 3MM: Brand: MIDAS REX MR8

## (undated) DEVICE — BETHLEHEM UNIVERSAL SPINE, KIT: Brand: CARDINAL HEALTH

## (undated) DEVICE — IMPERVIOUS STOCKINETTE: Brand: DEROYAL

## (undated) DEVICE — NEEDLE SPINAL18G X 3.5 IN QUINCKE

## (undated) DEVICE — PENCIL ELECTROSURG E-Z CLEAN -0035H

## (undated) DEVICE — SUT MONOCRYL 2-0 CT-1 36 IN Y945H

## (undated) DEVICE — DRAPE SURGIKIT SADDLE BAG

## (undated) DEVICE — SUT ETHILON 2-0 FSLX 30 IN 1674H

## (undated) DEVICE — 3000CC GUARDIAN II: Brand: GUARDIAN

## (undated) DEVICE — INTENDED FOR TISSUE SEPARATION, AND OTHER PROCEDURES THAT REQUIRE A SHARP SURGICAL BLADE TO PUNCTURE OR CUT.: Brand: BARD-PARKER ® CARBON RIB-BACK BLADES

## (undated) DEVICE — COBAN 6 IN STERILE

## (undated) DEVICE — SKIN MARKER DUAL TIP WITH RULER CAP, FLEXIBLE RULER AND LABELS: Brand: DEVON

## (undated) DEVICE — PUDDLE VAC

## (undated) DEVICE — DRILL 2.3MM ADJ YUKON

## (undated) DEVICE — GLOVE SRG BIOGEL 8

## (undated) DEVICE — SURGIFOAM 8.5 X 12.5

## (undated) DEVICE — TELFA NON-ADHERENT ABSORBENT DRESSING: Brand: TELFA

## (undated) DEVICE — BLADE SHAVER DISSECTOR 4MM 13CM COOLCUT

## (undated) DEVICE — SUPPLY FEE STD

## (undated) DEVICE — SCD SEQUENTIAL COMPRESSION COMFORT SLEEVE MEDIUM KNEE LENGTH: Brand: KENDALL SCD

## (undated) DEVICE — BIPOLAR SEALER 23-113-1 AQM 2.3: Brand: AQUAMANTYS™

## (undated) DEVICE — PACK TOTAL KNEE PBDS

## (undated) DEVICE — 3M™ PRECISE™ MULTI-SHOT DS DISPOSABLE SKIN STAPLER, DS-5: Brand: 3M™ PRECISE™

## (undated) DEVICE — ABDOMINAL PAD: Brand: DERMACEA

## (undated) DEVICE — 3M™ STERI-STRIP™ REINFORCED ADHESIVE SKIN CLOSURES, R1547, 1/2 IN X 4 IN (12 MM X 100 MM), 6 STRIPS/ENVELOPE: Brand: 3M™ STERI-STRIP™

## (undated) DEVICE — NEURO PATTIES 1/2 X 1 1/2

## (undated) DEVICE — DISPOSABLE EQUIPMENT COVER: Brand: SMALL TOWEL DRAPE

## (undated) DEVICE — TELFA ADHESIVE ISLAND DRESSING: Brand: TELFA

## (undated) DEVICE — TRAY FOLEY 16FR URIMETER SURESTEP

## (undated) DEVICE — LIGHT HANDLE COVER SLEEVE DISP BLUE STELLAR

## (undated) DEVICE — GLOVE SRG BIOGEL 7.5

## (undated) DEVICE — DRAPE SHEET X-LG

## (undated) DEVICE — SUT MONOCRYL 3-0 PS-2 27 IN Y427H

## (undated) DEVICE — DRAPE C-ARMOUR

## (undated) DEVICE — SUT STRATAFIX SPIRAL PDS PLUS 1 CTX 18 IN SXPP1A400

## (undated) DEVICE — TUBING SUCTION 5MM X 12 FT

## (undated) DEVICE — DRAIN JACKSON PRATT 15FR: Brand: CARDINAL HEALTH

## (undated) DEVICE — U-DRAPE: Brand: CONVERTORS

## (undated) DEVICE — GAUZE SPONGES,USP TYPE VII GAUZE, 12 PLY: Brand: CURITY

## (undated) DEVICE — INTENDED FOR TISSUE SEPARATION, AND OTHER PROCEDURES THAT REQUIRE A SHARP SURGICAL BLADE TO PUNCTURE OR CUT.: Brand: BARD-PARKER SAFETY BLADES SIZE 10, STERILE

## (undated) DEVICE — SUT MONOCRYL 2-0 SH 27 IN Y417H

## (undated) DEVICE — TAP 3MM YUKON

## (undated) DEVICE — PROBE MARKER: Brand: XIA

## (undated) DEVICE — CYSTO TUBING SINGLE IRRIGATION

## (undated) DEVICE — SUT VICRYL 1 CTX 36 IN J977H

## (undated) DEVICE — STRL ALLENTOWN HIP SHOULDER PK: Brand: CARDINAL HEALTH

## (undated) DEVICE — SNAP KOVER: Brand: UNBRANDED

## (undated) DEVICE — SUT STRATAFIX SPIRAL MONOCRYL PLUS 4-0 PS-2 45CM SXMP1B118

## (undated) DEVICE — BOWL ASSY BM210 DUAL BLADE DISPOSABLE: Brand: MIDAS REX™